# Patient Record
Sex: MALE | Race: ASIAN | NOT HISPANIC OR LATINO | URBAN - METROPOLITAN AREA
[De-identification: names, ages, dates, MRNs, and addresses within clinical notes are randomized per-mention and may not be internally consistent; named-entity substitution may affect disease eponyms.]

---

## 2015-12-16 RX ORDER — AMLODIPINE BESYLATE 2.5 MG/1
1 TABLET ORAL
Qty: 0 | Refills: 0 | COMMUNITY
Start: 2015-12-16

## 2015-12-16 RX ORDER — INSULIN GLARGINE 100 [IU]/ML
10 INJECTION, SOLUTION SUBCUTANEOUS
Qty: 0 | Refills: 0 | COMMUNITY
Start: 2015-12-16

## 2015-12-16 RX ORDER — INSULIN LISPRO 100/ML
8 VIAL (ML) SUBCUTANEOUS
Qty: 0 | Refills: 0 | COMMUNITY
Start: 2015-12-16

## 2017-01-01 ENCOUNTER — INPATIENT (INPATIENT)
Facility: HOSPITAL | Age: 82
LOS: 7 days | End: 2017-06-02
Attending: INTERNAL MEDICINE | Admitting: INTERNAL MEDICINE
Payer: MEDICAID

## 2017-01-01 VITALS
DIASTOLIC BLOOD PRESSURE: 68 MMHG | TEMPERATURE: 97 F | RESPIRATION RATE: 24 BRPM | OXYGEN SATURATION: 100 % | HEART RATE: 101 BPM | SYSTOLIC BLOOD PRESSURE: 153 MMHG

## 2017-01-01 DIAGNOSIS — Z71.89 OTHER SPECIFIED COUNSELING: ICD-10-CM

## 2017-01-01 DIAGNOSIS — R06.00 DYSPNEA, UNSPECIFIED: ICD-10-CM

## 2017-01-01 DIAGNOSIS — N18.6 END STAGE RENAL DISEASE: ICD-10-CM

## 2017-01-01 DIAGNOSIS — E87.4 MIXED DISORDER OF ACID-BASE BALANCE: ICD-10-CM

## 2017-01-01 DIAGNOSIS — B33.8 OTHER SPECIFIED VIRAL DISEASES: ICD-10-CM

## 2017-01-01 DIAGNOSIS — J96.90 RESPIRATORY FAILURE, UNSPECIFIED, UNSPECIFIED WHETHER WITH HYPOXIA OR HYPERCAPNIA: ICD-10-CM

## 2017-01-01 DIAGNOSIS — Z51.5 ENCOUNTER FOR PALLIATIVE CARE: ICD-10-CM

## 2017-01-01 DIAGNOSIS — I50.30 UNSPECIFIED DIASTOLIC (CONGESTIVE) HEART FAILURE: ICD-10-CM

## 2017-01-01 DIAGNOSIS — R57.9 SHOCK, UNSPECIFIED: ICD-10-CM

## 2017-01-01 DIAGNOSIS — J44.1 CHRONIC OBSTRUCTIVE PULMONARY DISEASE WITH (ACUTE) EXACERBATION: ICD-10-CM

## 2017-01-01 DIAGNOSIS — R63.3 FEEDING DIFFICULTIES: ICD-10-CM

## 2017-01-01 DIAGNOSIS — J96.01 ACUTE RESPIRATORY FAILURE WITH HYPOXIA: ICD-10-CM

## 2017-01-01 DIAGNOSIS — E11.9 TYPE 2 DIABETES MELLITUS WITHOUT COMPLICATIONS: ICD-10-CM

## 2017-01-01 DIAGNOSIS — R53.2 FUNCTIONAL QUADRIPLEGIA: ICD-10-CM

## 2017-01-01 DIAGNOSIS — E87.1 HYPO-OSMOLALITY AND HYPONATREMIA: ICD-10-CM

## 2017-01-01 DIAGNOSIS — E03.9 HYPOTHYROIDISM, UNSPECIFIED: ICD-10-CM

## 2017-01-01 DIAGNOSIS — R65.10 SYSTEMIC INFLAMMATORY RESPONSE SYNDROME (SIRS) OF NON-INFECTIOUS ORIGIN WITHOUT ACUTE ORGAN DYSFUNCTION: ICD-10-CM

## 2017-01-01 DIAGNOSIS — J44.9 CHRONIC OBSTRUCTIVE PULMONARY DISEASE, UNSPECIFIED: ICD-10-CM

## 2017-01-01 DIAGNOSIS — I50.9 HEART FAILURE, UNSPECIFIED: ICD-10-CM

## 2017-01-01 DIAGNOSIS — I10 ESSENTIAL (PRIMARY) HYPERTENSION: ICD-10-CM

## 2017-01-01 DIAGNOSIS — E83.39 OTHER DISORDERS OF PHOSPHORUS METABOLISM: ICD-10-CM

## 2017-01-01 DIAGNOSIS — K21.9 GASTRO-ESOPHAGEAL REFLUX DISEASE WITHOUT ESOPHAGITIS: ICD-10-CM

## 2017-01-01 DIAGNOSIS — R50.9 FEVER, UNSPECIFIED: ICD-10-CM

## 2017-01-01 DIAGNOSIS — R14.0 ABDOMINAL DISTENSION (GASEOUS): ICD-10-CM

## 2017-01-01 DIAGNOSIS — A41.9 SEPSIS, UNSPECIFIED ORGANISM: ICD-10-CM

## 2017-01-01 DIAGNOSIS — Z29.9 ENCOUNTER FOR PROPHYLACTIC MEASURES, UNSPECIFIED: ICD-10-CM

## 2017-01-01 LAB
ALBUMIN SERPL ELPH-MCNC: 2.7 G/DL — LOW (ref 3.3–5)
ALBUMIN SERPL ELPH-MCNC: 3.1 G/DL — LOW (ref 3.3–5)
ALBUMIN SERPL ELPH-MCNC: 3.1 G/DL — LOW (ref 3.3–5)
ALBUMIN SERPL ELPH-MCNC: 3.2 G/DL — LOW (ref 3.3–5)
ALBUMIN SERPL ELPH-MCNC: 3.3 G/DL — SIGNIFICANT CHANGE UP (ref 3.3–5)
ALBUMIN SERPL ELPH-MCNC: 3.4 G/DL — SIGNIFICANT CHANGE UP (ref 3.3–5)
ALBUMIN SERPL ELPH-MCNC: 3.4 G/DL — SIGNIFICANT CHANGE UP (ref 3.3–5)
ALBUMIN SERPL ELPH-MCNC: 3.9 G/DL — SIGNIFICANT CHANGE UP (ref 3.3–5)
ALBUMIN SERPL ELPH-MCNC: 4.3 G/DL — SIGNIFICANT CHANGE UP (ref 3.3–5)
ALP SERPL-CCNC: 105 U/L — SIGNIFICANT CHANGE UP (ref 40–120)
ALP SERPL-CCNC: 56 U/L — SIGNIFICANT CHANGE UP (ref 40–120)
ALP SERPL-CCNC: 62 U/L — SIGNIFICANT CHANGE UP (ref 40–120)
ALP SERPL-CCNC: 72 U/L — SIGNIFICANT CHANGE UP (ref 40–120)
ALP SERPL-CCNC: 72 U/L — SIGNIFICANT CHANGE UP (ref 40–120)
ALP SERPL-CCNC: 76 U/L — SIGNIFICANT CHANGE UP (ref 40–120)
ALP SERPL-CCNC: 78 U/L — SIGNIFICANT CHANGE UP (ref 40–120)
ALP SERPL-CCNC: 80 U/L — SIGNIFICANT CHANGE UP (ref 40–120)
ALP SERPL-CCNC: 97 U/L — SIGNIFICANT CHANGE UP (ref 40–120)
ALT FLD-CCNC: 10 U/L — SIGNIFICANT CHANGE UP (ref 4–41)
ALT FLD-CCNC: 11 U/L — SIGNIFICANT CHANGE UP (ref 4–41)
ALT FLD-CCNC: 11 U/L — SIGNIFICANT CHANGE UP (ref 4–41)
ALT FLD-CCNC: 12 U/L — SIGNIFICANT CHANGE UP (ref 4–41)
ALT FLD-CCNC: 13 U/L — SIGNIFICANT CHANGE UP (ref 4–41)
ALT FLD-CCNC: 16 U/L — SIGNIFICANT CHANGE UP (ref 4–41)
ALT FLD-CCNC: 6 U/L — SIGNIFICANT CHANGE UP (ref 4–41)
ALT FLD-CCNC: 9 U/L — SIGNIFICANT CHANGE UP (ref 4–41)
ALT FLD-CCNC: 9 U/L — SIGNIFICANT CHANGE UP (ref 4–41)
ANISOCYTOSIS BLD QL: SLIGHT — SIGNIFICANT CHANGE UP
APPEARANCE UR: CLEAR — SIGNIFICANT CHANGE UP
AST SERPL-CCNC: 10 U/L — SIGNIFICANT CHANGE UP (ref 4–40)
AST SERPL-CCNC: 13 U/L — SIGNIFICANT CHANGE UP (ref 4–40)
AST SERPL-CCNC: 15 U/L — SIGNIFICANT CHANGE UP (ref 4–40)
AST SERPL-CCNC: 17 U/L — SIGNIFICANT CHANGE UP (ref 4–40)
AST SERPL-CCNC: 18 U/L — SIGNIFICANT CHANGE UP (ref 4–40)
AST SERPL-CCNC: 19 U/L — SIGNIFICANT CHANGE UP (ref 4–40)
AST SERPL-CCNC: 20 U/L — SIGNIFICANT CHANGE UP (ref 4–40)
AST SERPL-CCNC: 42 U/L — HIGH (ref 4–40)
AST SERPL-CCNC: 8 U/L — SIGNIFICANT CHANGE UP (ref 4–40)
B PERT DNA SPEC QL NAA+PROBE: SIGNIFICANT CHANGE UP
B-OH-BUTYR SERPL-SCNC: 0.4 MMOL/L — SIGNIFICANT CHANGE UP (ref 0–0.4)
BACTERIA # UR AUTO: SIGNIFICANT CHANGE UP
BACTERIA BLD CULT: SIGNIFICANT CHANGE UP
BACTERIA BLD CULT: SIGNIFICANT CHANGE UP
BASE EXCESS BLDA CALC-SCNC: -0.3 MMOL/L — SIGNIFICANT CHANGE UP
BASE EXCESS BLDA CALC-SCNC: -1.1 MMOL/L — SIGNIFICANT CHANGE UP
BASE EXCESS BLDA CALC-SCNC: -1.2 MMOL/L — SIGNIFICANT CHANGE UP
BASE EXCESS BLDA CALC-SCNC: -1.2 MMOL/L — SIGNIFICANT CHANGE UP
BASE EXCESS BLDA CALC-SCNC: -1.4 MMOL/L — SIGNIFICANT CHANGE UP
BASE EXCESS BLDA CALC-SCNC: -1.8 MMOL/L — SIGNIFICANT CHANGE UP
BASE EXCESS BLDA CALC-SCNC: -2.1 MMOL/L — SIGNIFICANT CHANGE UP
BASE EXCESS BLDA CALC-SCNC: -2.1 MMOL/L — SIGNIFICANT CHANGE UP
BASE EXCESS BLDA CALC-SCNC: -2.9 MMOL/L — SIGNIFICANT CHANGE UP
BASE EXCESS BLDA CALC-SCNC: -3 MMOL/L — SIGNIFICANT CHANGE UP
BASE EXCESS BLDA CALC-SCNC: -3.3 MMOL/L — SIGNIFICANT CHANGE UP
BASE EXCESS BLDA CALC-SCNC: -3.4 MMOL/L — SIGNIFICANT CHANGE UP
BASE EXCESS BLDA CALC-SCNC: -4 MMOL/L — SIGNIFICANT CHANGE UP
BASE EXCESS BLDA CALC-SCNC: -4.3 MMOL/L — SIGNIFICANT CHANGE UP
BASE EXCESS BLDA CALC-SCNC: -4.6 MMOL/L — SIGNIFICANT CHANGE UP
BASE EXCESS BLDA CALC-SCNC: -4.6 MMOL/L — SIGNIFICANT CHANGE UP
BASE EXCESS BLDA CALC-SCNC: -5.1 MMOL/L — SIGNIFICANT CHANGE UP
BASE EXCESS BLDA CALC-SCNC: -5.2 MMOL/L — SIGNIFICANT CHANGE UP
BASE EXCESS BLDA CALC-SCNC: -5.2 MMOL/L — SIGNIFICANT CHANGE UP
BASE EXCESS BLDA CALC-SCNC: -6.2 MMOL/L — SIGNIFICANT CHANGE UP
BASE EXCESS BLDA CALC-SCNC: -6.7 MMOL/L — SIGNIFICANT CHANGE UP
BASE EXCESS BLDA CALC-SCNC: 0.6 MMOL/L — SIGNIFICANT CHANGE UP
BASE EXCESS BLDA CALC-SCNC: 1 MMOL/L — SIGNIFICANT CHANGE UP
BASE EXCESS BLDA CALC-SCNC: 1.1 MMOL/L — SIGNIFICANT CHANGE UP
BASE EXCESS BLDA CALC-SCNC: 1.7 MMOL/L — SIGNIFICANT CHANGE UP
BASE EXCESS BLDA CALC-SCNC: 2.8 MMOL/L — SIGNIFICANT CHANGE UP
BASE EXCESS BLDA CALC-SCNC: 3 MMOL/L — SIGNIFICANT CHANGE UP
BASE EXCESS BLDV CALC-SCNC: 1.3 MMOL/L — SIGNIFICANT CHANGE UP
BASE EXCESS BLDV CALC-SCNC: 3.8 MMOL/L — SIGNIFICANT CHANGE UP
BASOPHILS # BLD AUTO: 0.01 K/UL — SIGNIFICANT CHANGE UP (ref 0–0.2)
BASOPHILS # BLD AUTO: 0.02 K/UL — SIGNIFICANT CHANGE UP (ref 0–0.2)
BASOPHILS # BLD AUTO: 0.03 K/UL — SIGNIFICANT CHANGE UP (ref 0–0.2)
BASOPHILS # BLD AUTO: 0.03 K/UL — SIGNIFICANT CHANGE UP (ref 0–0.2)
BASOPHILS NFR BLD AUTO: 0.1 % — SIGNIFICANT CHANGE UP (ref 0–2)
BASOPHILS NFR BLD AUTO: 0.2 % — SIGNIFICANT CHANGE UP (ref 0–2)
BASOPHILS NFR BLD AUTO: 0.3 % — SIGNIFICANT CHANGE UP (ref 0–2)
BASOPHILS NFR BLD AUTO: 0.3 % — SIGNIFICANT CHANGE UP (ref 0–2)
BASOPHILS NFR SPEC: 0 % — SIGNIFICANT CHANGE UP (ref 0–2)
BASOPHILS NFR SPEC: 0 % — SIGNIFICANT CHANGE UP (ref 0–2)
BILIRUB SERPL-MCNC: 0.2 MG/DL — SIGNIFICANT CHANGE UP (ref 0.2–1.2)
BILIRUB SERPL-MCNC: 0.3 MG/DL — SIGNIFICANT CHANGE UP (ref 0.2–1.2)
BILIRUB SERPL-MCNC: 0.4 MG/DL — SIGNIFICANT CHANGE UP (ref 0.2–1.2)
BILIRUB SERPL-MCNC: 0.6 MG/DL — SIGNIFICANT CHANGE UP (ref 0.2–1.2)
BILIRUB SERPL-MCNC: 0.8 MG/DL — SIGNIFICANT CHANGE UP (ref 0.2–1.2)
BILIRUB UR-MCNC: NEGATIVE — SIGNIFICANT CHANGE UP
BLASTS # FLD: 0 % — SIGNIFICANT CHANGE UP (ref 0–0)
BLOOD GAS VENOUS - CREATININE: 3.81 MG/DL — HIGH (ref 0.5–1.3)
BLOOD UR QL VISUAL: NEGATIVE — SIGNIFICANT CHANGE UP
BUN SERPL-MCNC: 22 MG/DL — SIGNIFICANT CHANGE UP (ref 7–23)
BUN SERPL-MCNC: 35 MG/DL — HIGH (ref 7–23)
BUN SERPL-MCNC: 43 MG/DL — HIGH (ref 7–23)
BUN SERPL-MCNC: 43 MG/DL — HIGH (ref 7–23)
BUN SERPL-MCNC: 44 MG/DL — HIGH (ref 7–23)
BUN SERPL-MCNC: 59 MG/DL — HIGH (ref 7–23)
BUN SERPL-MCNC: 64 MG/DL — HIGH (ref 7–23)
BUN SERPL-MCNC: 68 MG/DL — HIGH (ref 7–23)
BUN SERPL-MCNC: 70 MG/DL — HIGH (ref 7–23)
BUN SERPL-MCNC: 84 MG/DL — HIGH (ref 7–23)
BUN SERPL-MCNC: 87 MG/DL — HIGH (ref 7–23)
BUN SERPL-MCNC: 87 MG/DL — HIGH (ref 7–23)
BUN SERPL-MCNC: 92 MG/DL — HIGH (ref 7–23)
C DIFF TOX GENS STL QL NAA+PROBE: SIGNIFICANT CHANGE UP
C PNEUM DNA SPEC QL NAA+PROBE: NOT DETECTED — SIGNIFICANT CHANGE UP
CA-I BLDA-SCNC: 1.01 MMOL/L — LOW (ref 1.15–1.29)
CA-I BLDA-SCNC: 1.02 MMOL/L — LOW (ref 1.15–1.29)
CA-I BLDA-SCNC: 1.06 MMOL/L — LOW (ref 1.15–1.29)
CA-I BLDA-SCNC: 1.08 MMOL/L — LOW (ref 1.15–1.29)
CA-I BLDA-SCNC: 1.11 MMOL/L — LOW (ref 1.15–1.29)
CALCIUM SERPL-MCNC: 6.9 MG/DL — LOW (ref 8.4–10.5)
CALCIUM SERPL-MCNC: 7 MG/DL — LOW (ref 8.4–10.5)
CALCIUM SERPL-MCNC: 7.1 MG/DL — LOW (ref 8.4–10.5)
CALCIUM SERPL-MCNC: 7.1 MG/DL — LOW (ref 8.4–10.5)
CALCIUM SERPL-MCNC: 7.3 MG/DL — LOW (ref 8.4–10.5)
CALCIUM SERPL-MCNC: 7.4 MG/DL — LOW (ref 8.4–10.5)
CALCIUM SERPL-MCNC: 7.5 MG/DL — LOW (ref 8.4–10.5)
CALCIUM SERPL-MCNC: 7.7 MG/DL — LOW (ref 8.4–10.5)
CALCIUM SERPL-MCNC: 7.7 MG/DL — LOW (ref 8.4–10.5)
CALCIUM SERPL-MCNC: 8.2 MG/DL — LOW (ref 8.4–10.5)
CALCIUM SERPL-MCNC: 8.6 MG/DL — SIGNIFICANT CHANGE UP (ref 8.4–10.5)
CHLORIDE BLDA-SCNC: 100 MMOL/L — SIGNIFICANT CHANGE UP (ref 96–108)
CHLORIDE BLDA-SCNC: 100 MMOL/L — SIGNIFICANT CHANGE UP (ref 96–108)
CHLORIDE BLDA-SCNC: 103 MMOL/L — SIGNIFICANT CHANGE UP (ref 96–108)
CHLORIDE BLDA-SCNC: 103 MMOL/L — SIGNIFICANT CHANGE UP (ref 96–108)
CHLORIDE BLDA-SCNC: 82 MMOL/L — LOW (ref 96–108)
CHLORIDE BLDA-SCNC: 88 MMOL/L — LOW (ref 96–108)
CHLORIDE BLDA-SCNC: 89 MMOL/L — LOW (ref 96–108)
CHLORIDE BLDA-SCNC: 90 MMOL/L — LOW (ref 96–108)
CHLORIDE BLDA-SCNC: 92 MMOL/L — LOW (ref 96–108)
CHLORIDE BLDA-SCNC: 92 MMOL/L — LOW (ref 96–108)
CHLORIDE BLDA-SCNC: 94 MMOL/L — LOW (ref 96–108)
CHLORIDE BLDA-SCNC: 95 MMOL/L — LOW (ref 96–108)
CHLORIDE BLDV-SCNC: 98 MMOL/L — SIGNIFICANT CHANGE UP (ref 96–108)
CHLORIDE SERPL-SCNC: 81 MMOL/L — LOW (ref 98–107)
CHLORIDE SERPL-SCNC: 82 MMOL/L — LOW (ref 98–107)
CHLORIDE SERPL-SCNC: 85 MMOL/L — LOW (ref 98–107)
CHLORIDE SERPL-SCNC: 86 MMOL/L — LOW (ref 98–107)
CHLORIDE SERPL-SCNC: 88 MMOL/L — LOW (ref 98–107)
CHLORIDE SERPL-SCNC: 89 MMOL/L — LOW (ref 98–107)
CHLORIDE SERPL-SCNC: 89 MMOL/L — LOW (ref 98–107)
CHLORIDE SERPL-SCNC: 91 MMOL/L — LOW (ref 98–107)
CHLORIDE SERPL-SCNC: 92 MMOL/L — LOW (ref 98–107)
CHLORIDE SERPL-SCNC: 94 MMOL/L — LOW (ref 98–107)
CHLORIDE SERPL-SCNC: 95 MMOL/L — LOW (ref 98–107)
CHLORIDE SERPL-SCNC: 95 MMOL/L — LOW (ref 98–107)
CHLORIDE SERPL-SCNC: 97 MMOL/L — LOW (ref 98–107)
CO2 SERPL-SCNC: 19 MMOL/L — LOW (ref 22–31)
CO2 SERPL-SCNC: 20 MMOL/L — LOW (ref 22–31)
CO2 SERPL-SCNC: 21 MMOL/L — LOW (ref 22–31)
CO2 SERPL-SCNC: 22 MMOL/L — SIGNIFICANT CHANGE UP (ref 22–31)
CO2 SERPL-SCNC: 23 MMOL/L — SIGNIFICANT CHANGE UP (ref 22–31)
CO2 SERPL-SCNC: 25 MMOL/L — SIGNIFICANT CHANGE UP (ref 22–31)
CO2 SERPL-SCNC: 26 MMOL/L — SIGNIFICANT CHANGE UP (ref 22–31)
CO2 SERPL-SCNC: 26 MMOL/L — SIGNIFICANT CHANGE UP (ref 22–31)
CO2 SERPL-SCNC: 27 MMOL/L — SIGNIFICANT CHANGE UP (ref 22–31)
CO2 SERPL-SCNC: 29 MMOL/L — SIGNIFICANT CHANGE UP (ref 22–31)
CO2 SERPL-SCNC: 34 MMOL/L — HIGH (ref 22–31)
COLOR SPEC: YELLOW — SIGNIFICANT CHANGE UP
CREAT BLDA-MCNC: 3.24 MG/DL — HIGH (ref 0.5–1.3)
CREAT BLDA-MCNC: 3.28 MG/DL — HIGH (ref 0.5–1.3)
CREAT BLDA-MCNC: 4.14 MG/DL — HIGH (ref 0.5–1.3)
CREAT BLDA-MCNC: 4.61 MG/DL — HIGH (ref 0.5–1.3)
CREAT BLDA-MCNC: 4.84 MG/DL — HIGH (ref 0.5–1.3)
CREAT BLDA-MCNC: 5.56 MG/DL — HIGH (ref 0.5–1.3)
CREAT BLDA-MCNC: 5.62 MG/DL — HIGH (ref 0.5–1.3)
CREAT BLDA-MCNC: 5.74 MG/DL — HIGH (ref 0.5–1.3)
CREAT BLDA-MCNC: 5.84 MG/DL — HIGH (ref 0.5–1.3)
CREAT BLDA-MCNC: 6.12 MG/DL — HIGH (ref 0.5–1.3)
CREAT BLDA-MCNC: 6.37 MG/DL — HIGH (ref 0.5–1.3)
CREAT BLDA-MCNC: SIGNIFICANT CHANGE UP MG/DL (ref 0.5–1.3)
CREAT SERPL-MCNC: 1.99 MG/DL — HIGH (ref 0.5–1.3)
CREAT SERPL-MCNC: 2.65 MG/DL — HIGH (ref 0.5–1.3)
CREAT SERPL-MCNC: 2.99 MG/DL — HIGH (ref 0.5–1.3)
CREAT SERPL-MCNC: 3.54 MG/DL — HIGH (ref 0.5–1.3)
CREAT SERPL-MCNC: 3.92 MG/DL — HIGH (ref 0.5–1.3)
CREAT SERPL-MCNC: 4.12 MG/DL — HIGH (ref 0.5–1.3)
CREAT SERPL-MCNC: 4.28 MG/DL — HIGH (ref 0.5–1.3)
CREAT SERPL-MCNC: 4.71 MG/DL — HIGH (ref 0.5–1.3)
CREAT SERPL-MCNC: 4.97 MG/DL — HIGH (ref 0.5–1.3)
CREAT SERPL-MCNC: 5.02 MG/DL — HIGH (ref 0.5–1.3)
CREAT SERPL-MCNC: 5.02 MG/DL — HIGH (ref 0.5–1.3)
CREAT SERPL-MCNC: 5.18 MG/DL — HIGH (ref 0.5–1.3)
CREAT SERPL-MCNC: 5.41 MG/DL — HIGH (ref 0.5–1.3)
EOSINOPHIL # BLD AUTO: 0 K/UL — SIGNIFICANT CHANGE UP (ref 0–0.5)
EOSINOPHIL # BLD AUTO: 0.01 K/UL — SIGNIFICANT CHANGE UP (ref 0–0.5)
EOSINOPHIL # BLD AUTO: 0.03 K/UL — SIGNIFICANT CHANGE UP (ref 0–0.5)
EOSINOPHIL NFR BLD AUTO: 0 % — SIGNIFICANT CHANGE UP (ref 0–6)
EOSINOPHIL NFR BLD AUTO: 0.1 % — SIGNIFICANT CHANGE UP (ref 0–6)
EOSINOPHIL NFR BLD AUTO: 0.3 % — SIGNIFICANT CHANGE UP (ref 0–6)
EOSINOPHIL NFR FLD: 0 % — SIGNIFICANT CHANGE UP (ref 0–6)
EOSINOPHIL NFR FLD: 1 % — SIGNIFICANT CHANGE UP (ref 0–6)
EPI CELLS # UR: SIGNIFICANT CHANGE UP
FLUAV H1 2009 PAND RNA SPEC QL NAA+PROBE: NOT DETECTED — SIGNIFICANT CHANGE UP
FLUAV H1 RNA SPEC QL NAA+PROBE: NOT DETECTED — SIGNIFICANT CHANGE UP
FLUAV H3 RNA SPEC QL NAA+PROBE: NOT DETECTED — SIGNIFICANT CHANGE UP
FLUAV SUBTYP SPEC NAA+PROBE: SIGNIFICANT CHANGE UP
FLUBV RNA SPEC QL NAA+PROBE: NOT DETECTED — SIGNIFICANT CHANGE UP
GAS PNL BLDV: 131 MMOL/L — LOW (ref 136–146)
GAS PNL BLDV: 132 MMOL/L — LOW (ref 136–146)
GIANT PLATELETS BLD QL SMEAR: PRESENT — SIGNIFICANT CHANGE UP
GIANT PLATELETS BLD QL SMEAR: PRESENT — SIGNIFICANT CHANGE UP
GLUCOSE BLDA-MCNC: 119 MG/DL — HIGH (ref 70–99)
GLUCOSE BLDA-MCNC: 130 MG/DL — HIGH (ref 70–99)
GLUCOSE BLDA-MCNC: 144 MG/DL — HIGH (ref 70–99)
GLUCOSE BLDA-MCNC: 145 MG/DL — HIGH (ref 70–99)
GLUCOSE BLDA-MCNC: 156 MG/DL — HIGH (ref 70–99)
GLUCOSE BLDA-MCNC: 164 MG/DL — HIGH (ref 70–99)
GLUCOSE BLDA-MCNC: 167 MG/DL — HIGH (ref 70–99)
GLUCOSE BLDA-MCNC: 170 MG/DL — HIGH (ref 70–99)
GLUCOSE BLDA-MCNC: 172 MG/DL — HIGH (ref 70–99)
GLUCOSE BLDA-MCNC: 174 MG/DL — HIGH (ref 70–99)
GLUCOSE BLDA-MCNC: 208 MG/DL — HIGH (ref 70–99)
GLUCOSE BLDA-MCNC: 214 MG/DL — HIGH (ref 70–99)
GLUCOSE BLDA-MCNC: 227 MG/DL — HIGH (ref 70–99)
GLUCOSE BLDA-MCNC: 247 MG/DL — HIGH (ref 70–99)
GLUCOSE BLDA-MCNC: 252 MG/DL — HIGH (ref 70–99)
GLUCOSE BLDA-MCNC: 259 MG/DL — HIGH (ref 70–99)
GLUCOSE BLDA-MCNC: 280 MG/DL — HIGH (ref 70–99)
GLUCOSE BLDA-MCNC: 282 MG/DL — HIGH (ref 70–99)
GLUCOSE BLDA-MCNC: 284 MG/DL — HIGH (ref 70–99)
GLUCOSE BLDA-MCNC: 295 MG/DL — HIGH (ref 70–99)
GLUCOSE BLDA-MCNC: 321 MG/DL — HIGH (ref 70–99)
GLUCOSE BLDA-MCNC: 327 MG/DL — HIGH (ref 70–99)
GLUCOSE BLDA-MCNC: 368 MG/DL — HIGH (ref 70–99)
GLUCOSE BLDA-MCNC: 387 MG/DL — HIGH (ref 70–99)
GLUCOSE BLDA-MCNC: 476 MG/DL — CRITICAL HIGH (ref 70–99)
GLUCOSE BLDA-MCNC: 849 MG/DL — CRITICAL HIGH (ref 70–99)
GLUCOSE BLDV-MCNC: 191 — HIGH (ref 70–99)
GLUCOSE BLDV-MCNC: 207 — HIGH (ref 70–99)
GLUCOSE SERPL-MCNC: 101 MG/DL — HIGH (ref 70–99)
GLUCOSE SERPL-MCNC: 127 MG/DL — HIGH (ref 70–99)
GLUCOSE SERPL-MCNC: 140 MG/DL — HIGH (ref 70–99)
GLUCOSE SERPL-MCNC: 157 MG/DL — HIGH (ref 70–99)
GLUCOSE SERPL-MCNC: 160 MG/DL — HIGH (ref 70–99)
GLUCOSE SERPL-MCNC: 164 MG/DL — HIGH (ref 70–99)
GLUCOSE SERPL-MCNC: 176 MG/DL — HIGH (ref 70–99)
GLUCOSE SERPL-MCNC: 188 MG/DL — HIGH (ref 70–99)
GLUCOSE SERPL-MCNC: 240 MG/DL — HIGH (ref 70–99)
GLUCOSE SERPL-MCNC: 287 MG/DL — HIGH (ref 70–99)
GLUCOSE SERPL-MCNC: 384 MG/DL — HIGH (ref 70–99)
GLUCOSE SERPL-MCNC: 386 MG/DL — HIGH (ref 70–99)
GLUCOSE SERPL-MCNC: 484 MG/DL — CRITICAL HIGH (ref 70–99)
GLUCOSE UR-MCNC: NEGATIVE — SIGNIFICANT CHANGE UP
GRAM STN SPT: SIGNIFICANT CHANGE UP
HADV DNA SPEC QL NAA+PROBE: NOT DETECTED — SIGNIFICANT CHANGE UP
HBA1C BLD-MCNC: 7.8 % — HIGH (ref 4–5.6)
HBV SURFACE AG SER-ACNC: NEGATIVE — SIGNIFICANT CHANGE UP
HCO3 BLDA-SCNC: 18 MMOL/L — LOW (ref 22–26)
HCO3 BLDA-SCNC: 18 MMOL/L — LOW (ref 22–26)
HCO3 BLDA-SCNC: 19 MMOL/L — LOW (ref 22–26)
HCO3 BLDA-SCNC: 20 MMOL/L — LOW (ref 22–26)
HCO3 BLDA-SCNC: 21 MMOL/L — LOW (ref 22–26)
HCO3 BLDA-SCNC: 22 MMOL/L — SIGNIFICANT CHANGE UP (ref 22–26)
HCO3 BLDA-SCNC: 23 MMOL/L — SIGNIFICANT CHANGE UP (ref 22–26)
HCO3 BLDA-SCNC: 24 MMOL/L — SIGNIFICANT CHANGE UP (ref 22–26)
HCO3 BLDA-SCNC: 24 MMOL/L — SIGNIFICANT CHANGE UP (ref 22–26)
HCO3 BLDA-SCNC: 25 MMOL/L — SIGNIFICANT CHANGE UP (ref 22–26)
HCO3 BLDA-SCNC: 26 MMOL/L — SIGNIFICANT CHANGE UP (ref 22–26)
HCO3 BLDA-SCNC: 26 MMOL/L — SIGNIFICANT CHANGE UP (ref 22–26)
HCO3 BLDV-SCNC: 22 MMOL/L — SIGNIFICANT CHANGE UP (ref 20–27)
HCO3 BLDV-SCNC: 25 MMOL/L — SIGNIFICANT CHANGE UP (ref 20–27)
HCOV 229E RNA SPEC QL NAA+PROBE: NOT DETECTED — SIGNIFICANT CHANGE UP
HCOV HKU1 RNA SPEC QL NAA+PROBE: NOT DETECTED — SIGNIFICANT CHANGE UP
HCOV NL63 RNA SPEC QL NAA+PROBE: NOT DETECTED — SIGNIFICANT CHANGE UP
HCOV OC43 RNA SPEC QL NAA+PROBE: NOT DETECTED — SIGNIFICANT CHANGE UP
HCT VFR BLD CALC: 30.6 % — LOW (ref 39–50)
HCT VFR BLD CALC: 31.2 % — LOW (ref 39–50)
HCT VFR BLD CALC: 31.7 % — LOW (ref 39–50)
HCT VFR BLD CALC: 32.5 % — LOW (ref 39–50)
HCT VFR BLD CALC: 32.9 % — LOW (ref 39–50)
HCT VFR BLD CALC: 33.4 % — LOW (ref 39–50)
HCT VFR BLD CALC: 34.2 % — LOW (ref 39–50)
HCT VFR BLD CALC: 34.4 % — LOW (ref 39–50)
HCT VFR BLD CALC: 37.8 % — LOW (ref 39–50)
HCT VFR BLD CALC: 39.2 % — SIGNIFICANT CHANGE UP (ref 39–50)
HCT VFR BLDA CALC: 25.8 % — LOW (ref 39–51)
HCT VFR BLDA CALC: 29.9 % — LOW (ref 39–51)
HCT VFR BLDA CALC: 32 % — LOW (ref 39–51)
HCT VFR BLDA CALC: 32.4 % — LOW (ref 39–51)
HCT VFR BLDA CALC: 32.5 % — LOW (ref 39–51)
HCT VFR BLDA CALC: 33 % — LOW (ref 39–51)
HCT VFR BLDA CALC: 33.6 % — LOW (ref 39–51)
HCT VFR BLDA CALC: 33.8 % — LOW (ref 39–51)
HCT VFR BLDA CALC: 33.8 % — LOW (ref 39–51)
HCT VFR BLDA CALC: 33.9 % — LOW (ref 39–51)
HCT VFR BLDA CALC: 33.9 % — LOW (ref 39–51)
HCT VFR BLDA CALC: 34.2 % — LOW (ref 39–51)
HCT VFR BLDA CALC: 34.4 % — LOW (ref 39–51)
HCT VFR BLDA CALC: 34.5 % — LOW (ref 39–51)
HCT VFR BLDA CALC: 34.7 % — LOW (ref 39–51)
HCT VFR BLDA CALC: 34.8 % — LOW (ref 39–51)
HCT VFR BLDA CALC: 34.9 % — LOW (ref 39–51)
HCT VFR BLDA CALC: 35.1 % — LOW (ref 39–51)
HCT VFR BLDA CALC: 37.1 % — LOW (ref 39–51)
HCT VFR BLDA CALC: 37.3 % — LOW (ref 39–51)
HCT VFR BLDA CALC: 37.4 % — LOW (ref 39–51)
HCT VFR BLDA CALC: 37.6 % — LOW (ref 39–51)
HCT VFR BLDA CALC: 38.1 % — LOW (ref 39–51)
HCT VFR BLDA CALC: 38.5 % — LOW (ref 39–51)
HCT VFR BLDA CALC: 38.8 % — LOW (ref 39–51)
HCT VFR BLDA CALC: 39.1 % — SIGNIFICANT CHANGE UP (ref 39–51)
HCT VFR BLDV CALC: 39.5 % — SIGNIFICANT CHANGE UP (ref 39–51)
HCT VFR BLDV CALC: 40.2 % — SIGNIFICANT CHANGE UP (ref 39–51)
HGB BLD-MCNC: 10.2 G/DL — LOW (ref 13–17)
HGB BLD-MCNC: 10.2 G/DL — LOW (ref 13–17)
HGB BLD-MCNC: 10.3 G/DL — LOW (ref 13–17)
HGB BLD-MCNC: 10.8 G/DL — LOW (ref 13–17)
HGB BLD-MCNC: 10.9 G/DL — LOW (ref 13–17)
HGB BLD-MCNC: 11 G/DL — LOW (ref 13–17)
HGB BLD-MCNC: 11.1 G/DL — LOW (ref 13–17)
HGB BLD-MCNC: 11.2 G/DL — LOW (ref 13–17)
HGB BLD-MCNC: 12.3 G/DL — LOW (ref 13–17)
HGB BLD-MCNC: 12.7 G/DL — LOW (ref 13–17)
HGB BLDA-MCNC: 10.4 G/DL — LOW (ref 13–17)
HGB BLDA-MCNC: 10.5 G/DL — LOW (ref 13–17)
HGB BLDA-MCNC: 10.5 G/DL — LOW (ref 13–17)
HGB BLDA-MCNC: 10.7 G/DL — LOW (ref 13–17)
HGB BLDA-MCNC: 10.9 G/DL — LOW (ref 13–17)
HGB BLDA-MCNC: 10.9 G/DL — LOW (ref 13–17)
HGB BLDA-MCNC: 11 G/DL — LOW (ref 13–17)
HGB BLDA-MCNC: 11.1 G/DL — LOW (ref 13–17)
HGB BLDA-MCNC: 11.2 G/DL — LOW (ref 13–17)
HGB BLDA-MCNC: 11.2 G/DL — LOW (ref 13–17)
HGB BLDA-MCNC: 11.3 G/DL — LOW (ref 13–17)
HGB BLDA-MCNC: 11.4 G/DL — LOW (ref 13–17)
HGB BLDA-MCNC: 12.1 G/DL — LOW (ref 13–17)
HGB BLDA-MCNC: 12.1 G/DL — LOW (ref 13–17)
HGB BLDA-MCNC: 12.2 G/DL — LOW (ref 13–17)
HGB BLDA-MCNC: 12.2 G/DL — LOW (ref 13–17)
HGB BLDA-MCNC: 12.4 G/DL — LOW (ref 13–17)
HGB BLDA-MCNC: 12.5 G/DL — LOW (ref 13–17)
HGB BLDA-MCNC: 12.6 G/DL — LOW (ref 13–17)
HGB BLDA-MCNC: 12.7 G/DL — LOW (ref 13–17)
HGB BLDA-MCNC: 8.3 G/DL — LOW (ref 13–17)
HGB BLDA-MCNC: 9.7 G/DL — LOW (ref 13–17)
HGB BLDV-MCNC: 12.8 G/DL — LOW (ref 13–17)
HGB BLDV-MCNC: 13.1 G/DL — SIGNIFICANT CHANGE UP (ref 13–17)
HMPV RNA SPEC QL NAA+PROBE: NOT DETECTED — SIGNIFICANT CHANGE UP
HPIV1 RNA SPEC QL NAA+PROBE: NOT DETECTED — SIGNIFICANT CHANGE UP
HPIV2 RNA SPEC QL NAA+PROBE: NOT DETECTED — SIGNIFICANT CHANGE UP
HPIV3 RNA SPEC QL NAA+PROBE: POSITIVE — HIGH
HPIV4 RNA SPEC QL NAA+PROBE: NOT DETECTED — SIGNIFICANT CHANGE UP
HYPOCHROMIA BLD QL: SLIGHT — SIGNIFICANT CHANGE UP
HYPOCHROMIA BLD QL: SLIGHT — SIGNIFICANT CHANGE UP
IMM GRANULOCYTES NFR BLD AUTO: 0.1 % — SIGNIFICANT CHANGE UP (ref 0–1.5)
IMM GRANULOCYTES NFR BLD AUTO: 0.2 % — SIGNIFICANT CHANGE UP (ref 0–1.5)
IMM GRANULOCYTES NFR BLD AUTO: 0.6 % — SIGNIFICANT CHANGE UP (ref 0–1.5)
IMM GRANULOCYTES NFR BLD AUTO: 0.6 % — SIGNIFICANT CHANGE UP (ref 0–1.5)
IMM GRANULOCYTES NFR BLD AUTO: 0.9 % — SIGNIFICANT CHANGE UP (ref 0–1.5)
IMM GRANULOCYTES NFR BLD AUTO: 1 % — SIGNIFICANT CHANGE UP (ref 0–1.5)
INR BLD: 1.14 — SIGNIFICANT CHANGE UP (ref 0.88–1.17)
KETONES UR-MCNC: NEGATIVE — SIGNIFICANT CHANGE UP
L PNEUMO AG UR QL: NEGATIVE — SIGNIFICANT CHANGE UP
LACTATE BLDA-SCNC: 0.8 MMOL/L — SIGNIFICANT CHANGE UP (ref 0.5–2)
LACTATE BLDA-SCNC: 0.9 MMOL/L — SIGNIFICANT CHANGE UP (ref 0.5–2)
LACTATE BLDA-SCNC: 1 MMOL/L — SIGNIFICANT CHANGE UP (ref 0.5–2)
LACTATE BLDA-SCNC: 1.1 MMOL/L — SIGNIFICANT CHANGE UP (ref 0.5–2)
LACTATE BLDA-SCNC: 1.2 MMOL/L — SIGNIFICANT CHANGE UP (ref 0.5–2)
LACTATE BLDA-SCNC: 1.3 MMOL/L — SIGNIFICANT CHANGE UP (ref 0.5–2)
LACTATE BLDA-SCNC: 1.4 MMOL/L — SIGNIFICANT CHANGE UP (ref 0.5–2)
LACTATE BLDA-SCNC: 1.4 MMOL/L — SIGNIFICANT CHANGE UP (ref 0.5–2)
LACTATE BLDA-SCNC: 1.5 MMOL/L — SIGNIFICANT CHANGE UP (ref 0.5–2)
LACTATE BLDA-SCNC: 1.5 MMOL/L — SIGNIFICANT CHANGE UP (ref 0.5–2)
LACTATE BLDA-SCNC: 1.6 MMOL/L — SIGNIFICANT CHANGE UP (ref 0.5–2)
LACTATE BLDA-SCNC: 1.7 MMOL/L — SIGNIFICANT CHANGE UP (ref 0.5–2)
LACTATE BLDV-MCNC: 1.6 MMOL/L — SIGNIFICANT CHANGE UP (ref 0.5–2)
LEUKOCYTE ESTERASE UR-ACNC: HIGH
LG PLATELETS BLD QL AUTO: SLIGHT — SIGNIFICANT CHANGE UP
LYMPHOCYTES # BLD AUTO: 0.28 K/UL — LOW (ref 1–3.3)
LYMPHOCYTES # BLD AUTO: 0.46 K/UL — LOW (ref 1–3.3)
LYMPHOCYTES # BLD AUTO: 0.48 K/UL — LOW (ref 1–3.3)
LYMPHOCYTES # BLD AUTO: 0.72 K/UL — LOW (ref 1–3.3)
LYMPHOCYTES # BLD AUTO: 0.95 K/UL — LOW (ref 1–3.3)
LYMPHOCYTES # BLD AUTO: 1.13 K/UL — SIGNIFICANT CHANGE UP (ref 1–3.3)
LYMPHOCYTES # BLD AUTO: 10.5 % — LOW (ref 13–44)
LYMPHOCYTES # BLD AUTO: 14.7 % — SIGNIFICANT CHANGE UP (ref 13–44)
LYMPHOCYTES # BLD AUTO: 3.5 % — LOW (ref 13–44)
LYMPHOCYTES # BLD AUTO: 4.4 % — LOW (ref 13–44)
LYMPHOCYTES # BLD AUTO: 5.8 % — LOW (ref 13–44)
LYMPHOCYTES # BLD AUTO: 6.2 % — LOW (ref 13–44)
LYMPHOCYTES NFR SPEC AUTO: 16 % — SIGNIFICANT CHANGE UP (ref 13–44)
LYMPHOCYTES NFR SPEC AUTO: 4.3 % — LOW (ref 13–44)
M PNEUMO DNA SPEC QL NAA+PROBE: NOT DETECTED — SIGNIFICANT CHANGE UP
MAGNESIUM SERPL-MCNC: 2 MG/DL — SIGNIFICANT CHANGE UP (ref 1.6–2.6)
MAGNESIUM SERPL-MCNC: 2.2 MG/DL — SIGNIFICANT CHANGE UP (ref 1.6–2.6)
MAGNESIUM SERPL-MCNC: 2.3 MG/DL — SIGNIFICANT CHANGE UP (ref 1.6–2.6)
MAGNESIUM SERPL-MCNC: 2.4 MG/DL — SIGNIFICANT CHANGE UP (ref 1.6–2.6)
MAGNESIUM SERPL-MCNC: 2.4 MG/DL — SIGNIFICANT CHANGE UP (ref 1.6–2.6)
MAGNESIUM SERPL-MCNC: 2.5 MG/DL — SIGNIFICANT CHANGE UP (ref 1.6–2.6)
MAGNESIUM SERPL-MCNC: 2.6 MG/DL — SIGNIFICANT CHANGE UP (ref 1.6–2.6)
MAGNESIUM SERPL-MCNC: 2.6 MG/DL — SIGNIFICANT CHANGE UP (ref 1.6–2.6)
MAGNESIUM SERPL-MCNC: 2.7 MG/DL — HIGH (ref 1.6–2.6)
MANUAL SMEAR VERIFICATION: SIGNIFICANT CHANGE UP
MCHC RBC-ENTMCNC: 19.7 PG — LOW (ref 27–34)
MCHC RBC-ENTMCNC: 19.8 PG — LOW (ref 27–34)
MCHC RBC-ENTMCNC: 20 PG — LOW (ref 27–34)
MCHC RBC-ENTMCNC: 20.1 PG — LOW (ref 27–34)
MCHC RBC-ENTMCNC: 20.2 PG — LOW (ref 27–34)
MCHC RBC-ENTMCNC: 20.2 PG — LOW (ref 27–34)
MCHC RBC-ENTMCNC: 20.4 PG — LOW (ref 27–34)
MCHC RBC-ENTMCNC: 32.2 % — SIGNIFICANT CHANGE UP (ref 32–36)
MCHC RBC-ENTMCNC: 32.4 % — SIGNIFICANT CHANGE UP (ref 32–36)
MCHC RBC-ENTMCNC: 32.5 % — SIGNIFICANT CHANGE UP (ref 32–36)
MCHC RBC-ENTMCNC: 32.5 % — SIGNIFICANT CHANGE UP (ref 32–36)
MCHC RBC-ENTMCNC: 32.6 % — SIGNIFICANT CHANGE UP (ref 32–36)
MCHC RBC-ENTMCNC: 32.6 % — SIGNIFICANT CHANGE UP (ref 32–36)
MCHC RBC-ENTMCNC: 32.8 % — SIGNIFICANT CHANGE UP (ref 32–36)
MCHC RBC-ENTMCNC: 33 % — SIGNIFICANT CHANGE UP (ref 32–36)
MCHC RBC-ENTMCNC: 33.3 % — SIGNIFICANT CHANGE UP (ref 32–36)
MCHC RBC-ENTMCNC: 33.8 % — SIGNIFICANT CHANGE UP (ref 32–36)
MCV RBC AUTO: 59.7 FL — LOW (ref 80–100)
MCV RBC AUTO: 60 FL — LOW (ref 80–100)
MCV RBC AUTO: 60.1 FL — LOW (ref 80–100)
MCV RBC AUTO: 60.7 FL — LOW (ref 80–100)
MCV RBC AUTO: 61.3 FL — LOW (ref 80–100)
MCV RBC AUTO: 61.4 FL — LOW (ref 80–100)
MCV RBC AUTO: 61.6 FL — LOW (ref 80–100)
MCV RBC AUTO: 62.1 FL — LOW (ref 80–100)
MCV RBC AUTO: 62.2 FL — LOW (ref 80–100)
MCV RBC AUTO: 62.8 FL — LOW (ref 80–100)
METAMYELOCYTES # FLD: 0 % — SIGNIFICANT CHANGE UP (ref 0–1)
MICROCYTES BLD QL: SIGNIFICANT CHANGE UP
MICROCYTES BLD QL: SIGNIFICANT CHANGE UP
MONOCYTES # BLD AUTO: 0.09 K/UL — SIGNIFICANT CHANGE UP (ref 0–0.9)
MONOCYTES # BLD AUTO: 0.3 K/UL — SIGNIFICANT CHANGE UP (ref 0–0.9)
MONOCYTES # BLD AUTO: 0.5 K/UL — SIGNIFICANT CHANGE UP (ref 0–0.9)
MONOCYTES # BLD AUTO: 0.69 K/UL — SIGNIFICANT CHANGE UP (ref 0–0.9)
MONOCYTES # BLD AUTO: 0.96 K/UL — HIGH (ref 0–0.9)
MONOCYTES # BLD AUTO: 1.41 K/UL — HIGH (ref 0–0.9)
MONOCYTES NFR BLD AUTO: 1.1 % — LOW (ref 2–14)
MONOCYTES NFR BLD AUTO: 12.5 % — SIGNIFICANT CHANGE UP (ref 2–14)
MONOCYTES NFR BLD AUTO: 3.9 % — SIGNIFICANT CHANGE UP (ref 2–14)
MONOCYTES NFR BLD AUTO: 6.2 % — SIGNIFICANT CHANGE UP (ref 2–14)
MONOCYTES NFR BLD AUTO: 7.6 % — SIGNIFICANT CHANGE UP (ref 2–14)
MONOCYTES NFR BLD AUTO: 8.6 % — SIGNIFICANT CHANGE UP (ref 2–14)
MONOCYTES NFR BLD: 1 % — LOW (ref 2–9)
MONOCYTES NFR BLD: 3.5 % — SIGNIFICANT CHANGE UP (ref 2–9)
MYELOCYTES NFR BLD: 0.9 % — HIGH (ref 0–0)
NEUTROPHIL AB SER-ACNC: 77 % — SIGNIFICANT CHANGE UP (ref 43–77)
NEUTROPHIL AB SER-ACNC: 91.3 % — HIGH (ref 43–77)
NEUTROPHILS # BLD AUTO: 14.09 K/UL — HIGH (ref 1.8–7.4)
NEUTROPHILS # BLD AUTO: 5.58 K/UL — SIGNIFICANT CHANGE UP (ref 1.8–7.4)
NEUTROPHILS # BLD AUTO: 6.95 K/UL — SIGNIFICANT CHANGE UP (ref 1.8–7.4)
NEUTROPHILS # BLD AUTO: 7.24 K/UL — SIGNIFICANT CHANGE UP (ref 1.8–7.4)
NEUTROPHILS # BLD AUTO: 7.28 K/UL — SIGNIFICANT CHANGE UP (ref 1.8–7.4)
NEUTROPHILS # BLD AUTO: 7.36 K/UL — SIGNIFICANT CHANGE UP (ref 1.8–7.4)
NEUTROPHILS NFR BLD AUTO: 72.4 % — SIGNIFICANT CHANGE UP (ref 43–77)
NEUTROPHILS NFR BLD AUTO: 81.1 % — HIGH (ref 43–77)
NEUTROPHILS NFR BLD AUTO: 85.8 % — HIGH (ref 43–77)
NEUTROPHILS NFR BLD AUTO: 89.2 % — HIGH (ref 43–77)
NEUTROPHILS NFR BLD AUTO: 89.5 % — HIGH (ref 43–77)
NEUTROPHILS NFR BLD AUTO: 92.1 % — HIGH (ref 43–77)
NEUTS BAND # BLD: 0 % — SIGNIFICANT CHANGE UP (ref 0–6)
NEUTS BAND # BLD: 2 % — SIGNIFICANT CHANGE UP (ref 0–6)
NITRITE UR-MCNC: NEGATIVE — SIGNIFICANT CHANGE UP
NRBC FLD-RTO: 1.2 — SIGNIFICANT CHANGE UP
NRBC FLD-RTO: 1.5 — SIGNIFICANT CHANGE UP
NRBC FLD-RTO: 2.1 — SIGNIFICANT CHANGE UP
NRBC FLD-RTO: 4.1 — SIGNIFICANT CHANGE UP
NT-PROBNP SERPL-SCNC: 790 PG/ML — SIGNIFICANT CHANGE UP
OSMOLALITY SERPL: 300 MOSMO/KG — HIGH (ref 275–295)
OSMOLALITY UR: 307 MOSMO/KG — SIGNIFICANT CHANGE UP (ref 50–1200)
OTHER - HEMATOLOGY %: 0 — SIGNIFICANT CHANGE UP
PCO2 BLDA: 51 MMHG — HIGH (ref 35–48)
PCO2 BLDA: 58 MMHG — HIGH (ref 35–48)
PCO2 BLDA: 60 MMHG — HIGH (ref 35–48)
PCO2 BLDA: 60 MMHG — HIGH (ref 35–48)
PCO2 BLDA: 62 MMHG — HIGH (ref 35–48)
PCO2 BLDA: 63 MMHG — HIGH (ref 35–48)
PCO2 BLDA: 64 MMHG — HIGH (ref 35–48)
PCO2 BLDA: 64 MMHG — HIGH (ref 35–48)
PCO2 BLDA: 66 MMHG — HIGH (ref 35–48)
PCO2 BLDA: 66 MMHG — HIGH (ref 35–48)
PCO2 BLDA: 68 MMHG — HIGH (ref 35–48)
PCO2 BLDA: 69 MMHG — HIGH (ref 35–48)
PCO2 BLDA: 69 MMHG — HIGH (ref 35–48)
PCO2 BLDA: 70 MMHG — CRITICAL HIGH (ref 35–48)
PCO2 BLDA: 70 MMHG — CRITICAL HIGH (ref 35–48)
PCO2 BLDA: 71 MMHG — CRITICAL HIGH (ref 35–48)
PCO2 BLDA: 73 MMHG — CRITICAL HIGH (ref 35–48)
PCO2 BLDA: 73 MMHG — CRITICAL HIGH (ref 35–48)
PCO2 BLDA: 74 MMHG — CRITICAL HIGH (ref 35–48)
PCO2 BLDA: 76 MMHG — CRITICAL HIGH (ref 35–48)
PCO2 BLDA: 76 MMHG — CRITICAL HIGH (ref 35–48)
PCO2 BLDA: 78 MMHG — CRITICAL HIGH (ref 35–48)
PCO2 BLDA: 79 MMHG — CRITICAL HIGH (ref 35–48)
PCO2 BLDA: 81 MMHG — CRITICAL HIGH (ref 35–48)
PCO2 BLDA: 88 MMHG — CRITICAL HIGH (ref 35–48)
PCO2 BLDV: 80 MMHG — CRITICAL HIGH (ref 41–51)
PCO2 BLDV: 90 MMHG — CRITICAL HIGH (ref 41–51)
PH BLDA: 7.11 PH — CRITICAL LOW (ref 7.35–7.45)
PH BLDA: 7.12 PH — CRITICAL LOW (ref 7.35–7.45)
PH BLDA: 7.13 PH — CRITICAL LOW (ref 7.35–7.45)
PH BLDA: 7.14 PH — CRITICAL LOW (ref 7.35–7.45)
PH BLDA: 7.14 PH — CRITICAL LOW (ref 7.35–7.45)
PH BLDA: 7.16 PH — CRITICAL LOW (ref 7.35–7.45)
PH BLDA: 7.17 PH — CRITICAL LOW (ref 7.35–7.45)
PH BLDA: 7.18 PH — CRITICAL LOW (ref 7.35–7.45)
PH BLDA: 7.19 PH — CRITICAL LOW (ref 7.35–7.45)
PH BLDA: 7.19 PH — CRITICAL LOW (ref 7.35–7.45)
PH BLDA: 7.22 PH — LOW (ref 7.35–7.45)
PH BLDA: 7.23 PH — LOW (ref 7.35–7.45)
PH BLDA: 7.26 PH — LOW (ref 7.35–7.45)
PH BLDA: 7.27 PH — LOW (ref 7.35–7.45)
PH BLDA: 7.28 PH — LOW (ref 7.35–7.45)
PH BLDA: 7.29 PH — LOW (ref 7.35–7.45)
PH BLDV: 7.14 PH — CRITICAL LOW (ref 7.32–7.43)
PH BLDV: 7.21 PH — LOW (ref 7.32–7.43)
PH UR: 6 — SIGNIFICANT CHANGE UP (ref 4.6–8)
PHOSPHATE SERPL-MCNC: 4.5 MG/DL — SIGNIFICANT CHANGE UP (ref 2.5–4.5)
PHOSPHATE SERPL-MCNC: 5.9 MG/DL — HIGH (ref 2.5–4.5)
PHOSPHATE SERPL-MCNC: 6.4 MG/DL — HIGH (ref 2.5–4.5)
PHOSPHATE SERPL-MCNC: 6.6 MG/DL — HIGH (ref 2.5–4.5)
PHOSPHATE SERPL-MCNC: 7.2 MG/DL — HIGH (ref 2.5–4.5)
PHOSPHATE SERPL-MCNC: 7.4 MG/DL — HIGH (ref 2.5–4.5)
PHOSPHATE SERPL-MCNC: 7.9 MG/DL — HIGH (ref 2.5–4.5)
PHOSPHATE SERPL-MCNC: 8 MG/DL — HIGH (ref 2.5–4.5)
PHOSPHATE SERPL-MCNC: 8 MG/DL — HIGH (ref 2.5–4.5)
PHOSPHATE SERPL-MCNC: 8.2 MG/DL — HIGH (ref 2.5–4.5)
PHOSPHATE SERPL-MCNC: 9.1 MG/DL — HIGH (ref 2.5–4.5)
PLATELET # BLD AUTO: 111 K/UL — LOW (ref 150–400)
PLATELET # BLD AUTO: 143 K/UL — LOW (ref 150–400)
PLATELET # BLD AUTO: 80 K/UL — LOW (ref 150–400)
PLATELET # BLD AUTO: 81 K/UL — LOW (ref 150–400)
PLATELET # BLD AUTO: 82 K/UL — LOW (ref 150–400)
PLATELET # BLD AUTO: 84 K/UL — LOW (ref 150–400)
PLATELET # BLD AUTO: 85 K/UL — LOW (ref 150–400)
PLATELET # BLD AUTO: 85 K/UL — LOW (ref 150–400)
PLATELET # BLD AUTO: 89 K/UL — LOW (ref 150–400)
PLATELET # BLD AUTO: 98 K/UL — LOW (ref 150–400)
PLATELET COUNT - ESTIMATE: SIGNIFICANT CHANGE UP
PLATELET COUNT - ESTIMATE: SIGNIFICANT CHANGE UP
PMV BLD: SIGNIFICANT CHANGE UP FL (ref 7–13)
PO2 BLDA: 101 MMHG — SIGNIFICANT CHANGE UP (ref 83–108)
PO2 BLDA: 103 MMHG — SIGNIFICANT CHANGE UP (ref 83–108)
PO2 BLDA: 103 MMHG — SIGNIFICANT CHANGE UP (ref 83–108)
PO2 BLDA: 104 MMHG — SIGNIFICANT CHANGE UP (ref 83–108)
PO2 BLDA: 104 MMHG — SIGNIFICANT CHANGE UP (ref 83–108)
PO2 BLDA: 110 MMHG — HIGH (ref 83–108)
PO2 BLDA: 117 MMHG — HIGH (ref 83–108)
PO2 BLDA: 117 MMHG — HIGH (ref 83–108)
PO2 BLDA: 118 MMHG — HIGH (ref 83–108)
PO2 BLDA: 122 MMHG — HIGH (ref 83–108)
PO2 BLDA: 135 MMHG — HIGH (ref 83–108)
PO2 BLDA: 137 MMHG — HIGH (ref 83–108)
PO2 BLDA: 148 MMHG — HIGH (ref 83–108)
PO2 BLDA: 152 MMHG — HIGH (ref 83–108)
PO2 BLDA: 173 MMHG — HIGH (ref 83–108)
PO2 BLDA: 192 MMHG — HIGH (ref 83–108)
PO2 BLDA: 47 MMHG — CRITICAL LOW (ref 83–108)
PO2 BLDA: 52 MMHG — LOW (ref 83–108)
PO2 BLDA: 52 MMHG — LOW (ref 83–108)
PO2 BLDA: 54 MMHG — LOW (ref 83–108)
PO2 BLDA: 66 MMHG — LOW (ref 83–108)
PO2 BLDA: 84 MMHG — SIGNIFICANT CHANGE UP (ref 83–108)
PO2 BLDA: 84 MMHG — SIGNIFICANT CHANGE UP (ref 83–108)
PO2 BLDA: 85 MMHG — SIGNIFICANT CHANGE UP (ref 83–108)
PO2 BLDA: 86 MMHG — SIGNIFICANT CHANGE UP (ref 83–108)
PO2 BLDA: 89 MMHG — SIGNIFICANT CHANGE UP (ref 83–108)
PO2 BLDA: 90 MMHG — SIGNIFICANT CHANGE UP (ref 83–108)
PO2 BLDV: 39 MMHG — SIGNIFICANT CHANGE UP (ref 35–40)
PO2 BLDV: 47 MMHG — HIGH (ref 35–40)
POIKILOCYTOSIS BLD QL AUTO: SIGNIFICANT CHANGE UP
POLYCHROMASIA BLD QL SMEAR: SLIGHT — SIGNIFICANT CHANGE UP
POLYCHROMASIA BLD QL SMEAR: SLIGHT — SIGNIFICANT CHANGE UP
POTASSIUM BLDA-SCNC: 3.1 MMOL/L — LOW (ref 3.4–4.5)
POTASSIUM BLDA-SCNC: 3.6 MMOL/L — SIGNIFICANT CHANGE UP (ref 3.4–4.5)
POTASSIUM BLDA-SCNC: 3.9 MMOL/L — SIGNIFICANT CHANGE UP (ref 3.4–4.5)
POTASSIUM BLDA-SCNC: 3.9 MMOL/L — SIGNIFICANT CHANGE UP (ref 3.4–4.5)
POTASSIUM BLDA-SCNC: 4.3 MMOL/L — SIGNIFICANT CHANGE UP (ref 3.4–4.5)
POTASSIUM BLDA-SCNC: 4.3 MMOL/L — SIGNIFICANT CHANGE UP (ref 3.4–4.5)
POTASSIUM BLDA-SCNC: 4.5 MMOL/L — SIGNIFICANT CHANGE UP (ref 3.4–4.5)
POTASSIUM BLDA-SCNC: 4.5 MMOL/L — SIGNIFICANT CHANGE UP (ref 3.4–4.5)
POTASSIUM BLDA-SCNC: 4.6 MMOL/L — HIGH (ref 3.4–4.5)
POTASSIUM BLDA-SCNC: 4.7 MMOL/L — HIGH (ref 3.4–4.5)
POTASSIUM BLDA-SCNC: 4.8 MMOL/L — HIGH (ref 3.4–4.5)
POTASSIUM BLDA-SCNC: 4.8 MMOL/L — HIGH (ref 3.4–4.5)
POTASSIUM BLDA-SCNC: 4.9 MMOL/L — HIGH (ref 3.4–4.5)
POTASSIUM BLDA-SCNC: 5 MMOL/L — HIGH (ref 3.4–4.5)
POTASSIUM BLDA-SCNC: 5 MMOL/L — HIGH (ref 3.4–4.5)
POTASSIUM BLDA-SCNC: 5.2 MMOL/L — HIGH (ref 3.4–4.5)
POTASSIUM BLDA-SCNC: 5.3 MMOL/L — HIGH (ref 3.4–4.5)
POTASSIUM BLDA-SCNC: 5.4 MMOL/L — HIGH (ref 3.4–4.5)
POTASSIUM BLDA-SCNC: 5.4 MMOL/L — HIGH (ref 3.4–4.5)
POTASSIUM BLDA-SCNC: 5.5 MMOL/L — HIGH (ref 3.4–4.5)
POTASSIUM BLDA-SCNC: 5.7 MMOL/L — HIGH (ref 3.4–4.5)
POTASSIUM BLDA-SCNC: 5.7 MMOL/L — HIGH (ref 3.4–4.5)
POTASSIUM BLDA-SCNC: 5.9 MMOL/L — HIGH (ref 3.4–4.5)
POTASSIUM BLDA-SCNC: 6.1 MMOL/L — HIGH (ref 3.4–4.5)
POTASSIUM BLDV-SCNC: 4.9 MMOL/L — HIGH (ref 3.4–4.5)
POTASSIUM BLDV-SCNC: 5.3 MMOL/L — HIGH (ref 3.4–4.5)
POTASSIUM SERPL-MCNC: 3.6 MMOL/L — SIGNIFICANT CHANGE UP (ref 3.5–5.3)
POTASSIUM SERPL-MCNC: 4.1 MMOL/L — SIGNIFICANT CHANGE UP (ref 3.5–5.3)
POTASSIUM SERPL-MCNC: 4.4 MMOL/L — SIGNIFICANT CHANGE UP (ref 3.5–5.3)
POTASSIUM SERPL-MCNC: 4.6 MMOL/L — SIGNIFICANT CHANGE UP (ref 3.5–5.3)
POTASSIUM SERPL-MCNC: 4.8 MMOL/L — SIGNIFICANT CHANGE UP (ref 3.5–5.3)
POTASSIUM SERPL-MCNC: 4.9 MMOL/L — SIGNIFICANT CHANGE UP (ref 3.5–5.3)
POTASSIUM SERPL-MCNC: 5.2 MMOL/L — SIGNIFICANT CHANGE UP (ref 3.5–5.3)
POTASSIUM SERPL-MCNC: 5.3 MMOL/L — SIGNIFICANT CHANGE UP (ref 3.5–5.3)
POTASSIUM SERPL-MCNC: 5.4 MMOL/L — HIGH (ref 3.5–5.3)
POTASSIUM SERPL-MCNC: 5.6 MMOL/L — HIGH (ref 3.5–5.3)
POTASSIUM SERPL-MCNC: 5.9 MMOL/L — HIGH (ref 3.5–5.3)
POTASSIUM SERPL-MCNC: 6.2 MMOL/L — CRITICAL HIGH (ref 3.5–5.3)
POTASSIUM SERPL-MCNC: 6.5 MMOL/L — CRITICAL HIGH (ref 3.5–5.3)
POTASSIUM SERPL-SCNC: 3.6 MMOL/L — SIGNIFICANT CHANGE UP (ref 3.5–5.3)
POTASSIUM SERPL-SCNC: 4.1 MMOL/L — SIGNIFICANT CHANGE UP (ref 3.5–5.3)
POTASSIUM SERPL-SCNC: 4.4 MMOL/L — SIGNIFICANT CHANGE UP (ref 3.5–5.3)
POTASSIUM SERPL-SCNC: 4.6 MMOL/L — SIGNIFICANT CHANGE UP (ref 3.5–5.3)
POTASSIUM SERPL-SCNC: 4.8 MMOL/L — SIGNIFICANT CHANGE UP (ref 3.5–5.3)
POTASSIUM SERPL-SCNC: 4.9 MMOL/L — SIGNIFICANT CHANGE UP (ref 3.5–5.3)
POTASSIUM SERPL-SCNC: 5.2 MMOL/L — SIGNIFICANT CHANGE UP (ref 3.5–5.3)
POTASSIUM SERPL-SCNC: 5.3 MMOL/L — SIGNIFICANT CHANGE UP (ref 3.5–5.3)
POTASSIUM SERPL-SCNC: 5.4 MMOL/L — HIGH (ref 3.5–5.3)
POTASSIUM SERPL-SCNC: 5.6 MMOL/L — HIGH (ref 3.5–5.3)
POTASSIUM SERPL-SCNC: 5.9 MMOL/L — HIGH (ref 3.5–5.3)
POTASSIUM SERPL-SCNC: 6.2 MMOL/L — CRITICAL HIGH (ref 3.5–5.3)
POTASSIUM SERPL-SCNC: 6.5 MMOL/L — CRITICAL HIGH (ref 3.5–5.3)
PREALB SERPL-MCNC: 16 MG/DL — LOW (ref 20–40)
PROMYELOCYTES # FLD: 0 % — SIGNIFICANT CHANGE UP (ref 0–0)
PROT SERPL-MCNC: 5.6 G/DL — LOW (ref 6–8.3)
PROT SERPL-MCNC: 5.6 G/DL — LOW (ref 6–8.3)
PROT SERPL-MCNC: 5.7 G/DL — LOW (ref 6–8.3)
PROT SERPL-MCNC: 5.8 G/DL — LOW (ref 6–8.3)
PROT SERPL-MCNC: 5.8 G/DL — LOW (ref 6–8.3)
PROT SERPL-MCNC: 5.9 G/DL — LOW (ref 6–8.3)
PROT SERPL-MCNC: 6 G/DL — SIGNIFICANT CHANGE UP (ref 6–8.3)
PROT SERPL-MCNC: 6.6 G/DL — SIGNIFICANT CHANGE UP (ref 6–8.3)
PROT SERPL-MCNC: 7.2 G/DL — SIGNIFICANT CHANGE UP (ref 6–8.3)
PROT UR-MCNC: 300 — SIGNIFICANT CHANGE UP
PROTHROM AB SERPL-ACNC: 12.8 SEC — SIGNIFICANT CHANGE UP (ref 9.8–13.1)
RBC # BLD: 5.1 M/UL — SIGNIFICANT CHANGE UP (ref 4.2–5.8)
RBC # BLD: 5.14 M/UL — SIGNIFICANT CHANGE UP (ref 4.2–5.8)
RBC # BLD: 5.15 M/UL — SIGNIFICANT CHANGE UP (ref 4.2–5.8)
RBC # BLD: 5.44 M/UL — SIGNIFICANT CHANGE UP (ref 4.2–5.8)
RBC # BLD: 5.44 M/UL — SIGNIFICANT CHANGE UP (ref 4.2–5.8)
RBC # BLD: 5.47 M/UL — SIGNIFICANT CHANGE UP (ref 4.2–5.8)
RBC # BLD: 5.51 M/UL — SIGNIFICANT CHANGE UP (ref 4.2–5.8)
RBC # BLD: 5.61 M/UL — SIGNIFICANT CHANGE UP (ref 4.2–5.8)
RBC # BLD: 6.08 M/UL — HIGH (ref 4.2–5.8)
RBC # BLD: 6.24 M/UL — HIGH (ref 4.2–5.8)
RBC # FLD: 17.4 % — HIGH (ref 10.3–14.5)
RBC # FLD: 17.4 % — HIGH (ref 10.3–14.5)
RBC # FLD: 17.6 % — HIGH (ref 10.3–14.5)
RBC # FLD: 17.6 % — HIGH (ref 10.3–14.5)
RBC # FLD: 17.9 % — HIGH (ref 10.3–14.5)
RBC # FLD: 17.9 % — HIGH (ref 10.3–14.5)
RBC # FLD: 18.1 % — HIGH (ref 10.3–14.5)
RBC # FLD: 18.7 % — HIGH (ref 10.3–14.5)
RBC CASTS # UR COMP ASSIST: SIGNIFICANT CHANGE UP (ref 0–?)
RSV RNA SPEC QL NAA+PROBE: NOT DETECTED — SIGNIFICANT CHANGE UP
RV+EV RNA SPEC QL NAA+PROBE: NOT DETECTED — SIGNIFICANT CHANGE UP
SAO2 % BLDA: 77.1 % — LOW (ref 95–99)
SAO2 % BLDA: 77.4 % — LOW (ref 95–99)
SAO2 % BLDA: 78.4 % — LOW (ref 95–99)
SAO2 % BLDA: 83.3 % — LOW (ref 95–99)
SAO2 % BLDA: 87.8 % — LOW (ref 95–99)
SAO2 % BLDA: 93.9 % — LOW (ref 95–99)
SAO2 % BLDA: 94.5 % — LOW (ref 95–99)
SAO2 % BLDA: 94.8 % — LOW (ref 95–99)
SAO2 % BLDA: 95.6 % — SIGNIFICANT CHANGE UP (ref 95–99)
SAO2 % BLDA: 95.7 % — SIGNIFICANT CHANGE UP (ref 95–99)
SAO2 % BLDA: 96.1 % — SIGNIFICANT CHANGE UP (ref 95–99)
SAO2 % BLDA: 96.3 % — SIGNIFICANT CHANGE UP (ref 95–99)
SAO2 % BLDA: 96.4 % — SIGNIFICANT CHANGE UP (ref 95–99)
SAO2 % BLDA: 96.6 % — SIGNIFICANT CHANGE UP (ref 95–99)
SAO2 % BLDA: 96.7 % — SIGNIFICANT CHANGE UP (ref 95–99)
SAO2 % BLDA: 97 % — SIGNIFICANT CHANGE UP (ref 95–99)
SAO2 % BLDA: 97.1 % — SIGNIFICANT CHANGE UP (ref 95–99)
SAO2 % BLDA: 97.2 % — SIGNIFICANT CHANGE UP (ref 95–99)
SAO2 % BLDA: 97.3 % — SIGNIFICANT CHANGE UP (ref 95–99)
SAO2 % BLDA: 97.4 % — SIGNIFICANT CHANGE UP (ref 95–99)
SAO2 % BLDA: 97.6 % — SIGNIFICANT CHANGE UP (ref 95–99)
SAO2 % BLDA: 97.9 % — SIGNIFICANT CHANGE UP (ref 95–99)
SAO2 % BLDA: 98.1 % — SIGNIFICANT CHANGE UP (ref 95–99)
SAO2 % BLDA: 98.2 % — SIGNIFICANT CHANGE UP (ref 95–99)
SAO2 % BLDA: 98.5 % — SIGNIFICANT CHANGE UP (ref 95–99)
SAO2 % BLDA: 98.6 % — SIGNIFICANT CHANGE UP (ref 95–99)
SAO2 % BLDA: 98.7 % — SIGNIFICANT CHANGE UP (ref 95–99)
SAO2 % BLDV: 60.1 % — SIGNIFICANT CHANGE UP (ref 60–85)
SAO2 % BLDV: 72 % — SIGNIFICANT CHANGE UP (ref 60–85)
SODIUM BLDA-SCNC: 108 MMOL/L — CRITICAL LOW (ref 136–146)
SODIUM BLDA-SCNC: 116 MMOL/L — CRITICAL LOW (ref 136–146)
SODIUM BLDA-SCNC: 117 MMOL/L — CRITICAL LOW (ref 136–146)
SODIUM BLDA-SCNC: 118 MMOL/L — CRITICAL LOW (ref 136–146)
SODIUM BLDA-SCNC: 121 MMOL/L — LOW (ref 136–146)
SODIUM BLDA-SCNC: 125 MMOL/L — LOW (ref 136–146)
SODIUM BLDA-SCNC: 126 MMOL/L — LOW (ref 136–146)
SODIUM BLDA-SCNC: 127 MMOL/L — LOW (ref 136–146)
SODIUM BLDA-SCNC: 128 MMOL/L — LOW (ref 136–146)
SODIUM BLDA-SCNC: 128 MMOL/L — LOW (ref 136–146)
SODIUM BLDA-SCNC: 129 MMOL/L — LOW (ref 136–146)
SODIUM BLDA-SCNC: 129 MMOL/L — LOW (ref 136–146)
SODIUM BLDA-SCNC: 130 MMOL/L — LOW (ref 136–146)
SODIUM BLDA-SCNC: 130 MMOL/L — LOW (ref 136–146)
SODIUM BLDA-SCNC: 131 MMOL/L — LOW (ref 136–146)
SODIUM BLDA-SCNC: 132 MMOL/L — LOW (ref 136–146)
SODIUM BLDA-SCNC: 132 MMOL/L — LOW (ref 136–146)
SODIUM BLDA-SCNC: 133 MMOL/L — LOW (ref 136–146)
SODIUM BLDA-SCNC: 133 MMOL/L — LOW (ref 136–146)
SODIUM BLDA-SCNC: 134 MMOL/L — LOW (ref 136–146)
SODIUM BLDA-SCNC: 134 MMOL/L — LOW (ref 136–146)
SODIUM BLDA-SCNC: 137 MMOL/L — SIGNIFICANT CHANGE UP (ref 136–146)
SODIUM SERPL-SCNC: 122 MMOL/L — LOW (ref 135–145)
SODIUM SERPL-SCNC: 124 MMOL/L — LOW (ref 135–145)
SODIUM SERPL-SCNC: 125 MMOL/L — LOW (ref 135–145)
SODIUM SERPL-SCNC: 128 MMOL/L — LOW (ref 135–145)
SODIUM SERPL-SCNC: 132 MMOL/L — LOW (ref 135–145)
SODIUM SERPL-SCNC: 133 MMOL/L — LOW (ref 135–145)
SODIUM SERPL-SCNC: 136 MMOL/L — SIGNIFICANT CHANGE UP (ref 135–145)
SODIUM SERPL-SCNC: 137 MMOL/L — SIGNIFICANT CHANGE UP (ref 135–145)
SODIUM SERPL-SCNC: 138 MMOL/L — SIGNIFICANT CHANGE UP (ref 135–145)
SODIUM SERPL-SCNC: 140 MMOL/L — SIGNIFICANT CHANGE UP (ref 135–145)
SODIUM SERPL-SCNC: 141 MMOL/L — SIGNIFICANT CHANGE UP (ref 135–145)
SODIUM UR-SCNC: < 20 MEQ/L — SIGNIFICANT CHANGE UP
SP GR SPEC: 1.02 — SIGNIFICANT CHANGE UP (ref 1–1.03)
SPECIMEN SOURCE: SIGNIFICANT CHANGE UP
TARGETS BLD QL SMEAR: SIGNIFICANT CHANGE UP
TARGETS BLD QL SMEAR: SLIGHT — SIGNIFICANT CHANGE UP
UROBILINOGEN FLD QL: 2 E.U. — SIGNIFICANT CHANGE UP (ref 0.1–0.2)
VANCOMYCIN FLD-MCNC: 16.1 UG/ML — SIGNIFICANT CHANGE UP
VARIANT LYMPHS # BLD: 0 % — SIGNIFICANT CHANGE UP
VARIANT LYMPHS # BLD: 3 % — SIGNIFICANT CHANGE UP
WBC # BLD: 12.26 K/UL — HIGH (ref 3.8–10.5)
WBC # BLD: 16.41 K/UL — HIGH (ref 3.8–10.5)
WBC # BLD: 6.13 K/UL — SIGNIFICANT CHANGE UP (ref 3.8–10.5)
WBC # BLD: 6.42 K/UL — SIGNIFICANT CHANGE UP (ref 3.8–10.5)
WBC # BLD: 7.7 K/UL — SIGNIFICANT CHANGE UP (ref 3.8–10.5)
WBC # BLD: 7.79 K/UL — SIGNIFICANT CHANGE UP (ref 3.8–10.5)
WBC # BLD: 7.91 K/UL — SIGNIFICANT CHANGE UP (ref 3.8–10.5)
WBC # BLD: 8.09 K/UL — SIGNIFICANT CHANGE UP (ref 3.8–10.5)
WBC # BLD: 8.71 K/UL — SIGNIFICANT CHANGE UP (ref 3.8–10.5)
WBC # BLD: 9.08 K/UL — SIGNIFICANT CHANGE UP (ref 3.8–10.5)
WBC # FLD AUTO: 12.26 K/UL — HIGH (ref 3.8–10.5)
WBC # FLD AUTO: 16.41 K/UL — HIGH (ref 3.8–10.5)
WBC # FLD AUTO: 6.13 K/UL — SIGNIFICANT CHANGE UP (ref 3.8–10.5)
WBC # FLD AUTO: 6.42 K/UL — SIGNIFICANT CHANGE UP (ref 3.8–10.5)
WBC # FLD AUTO: 7.7 K/UL — SIGNIFICANT CHANGE UP (ref 3.8–10.5)
WBC # FLD AUTO: 7.79 K/UL — SIGNIFICANT CHANGE UP (ref 3.8–10.5)
WBC # FLD AUTO: 7.91 K/UL — SIGNIFICANT CHANGE UP (ref 3.8–10.5)
WBC # FLD AUTO: 8.09 K/UL — SIGNIFICANT CHANGE UP (ref 3.8–10.5)
WBC # FLD AUTO: 8.71 K/UL — SIGNIFICANT CHANGE UP (ref 3.8–10.5)
WBC # FLD AUTO: 9.08 K/UL — SIGNIFICANT CHANGE UP (ref 3.8–10.5)
WBC UR QL: SIGNIFICANT CHANGE UP (ref 0–?)

## 2017-01-01 PROCEDURE — 72170 X-RAY EXAM OF PELVIS: CPT | Mod: 26

## 2017-01-01 PROCEDURE — 99291 CRITICAL CARE FIRST HOUR: CPT

## 2017-01-01 PROCEDURE — 71010: CPT | Mod: 26

## 2017-01-01 PROCEDURE — 99291 CRITICAL CARE FIRST HOUR: CPT | Mod: 25

## 2017-01-01 PROCEDURE — 99497 ADVNCD CARE PLAN 30 MIN: CPT | Mod: 25

## 2017-01-01 PROCEDURE — 99223 1ST HOSP IP/OBS HIGH 75: CPT

## 2017-01-01 PROCEDURE — 74000: CPT | Mod: 26

## 2017-01-01 PROCEDURE — 74000: CPT | Mod: 26,76

## 2017-01-01 PROCEDURE — 31500 INSERT EMERGENCY AIRWAY: CPT | Mod: GC

## 2017-01-01 RX ORDER — BUDESONIDE AND FORMOTEROL FUMARATE DIHYDRATE 160; 4.5 UG/1; UG/1
2 AEROSOL RESPIRATORY (INHALATION)
Qty: 0 | Refills: 0 | Status: DISCONTINUED | OUTPATIENT
Start: 2017-01-01 | End: 2017-01-01

## 2017-01-01 RX ORDER — ERYTHROMYCIN ETHYLSUCCINATE 400 MG
250 TABLET ORAL ONCE
Qty: 0 | Refills: 0 | Status: COMPLETED | OUTPATIENT
Start: 2017-01-01 | End: 2017-01-01

## 2017-01-01 RX ORDER — INSULIN LISPRO 100/ML
VIAL (ML) SUBCUTANEOUS EVERY 6 HOURS
Qty: 0 | Refills: 0 | Status: DISCONTINUED | OUTPATIENT
Start: 2017-01-01 | End: 2017-01-01

## 2017-01-01 RX ORDER — CISATRACURIUM BESYLATE 2 MG/ML
20 INJECTION INTRAVENOUS ONCE
Qty: 0 | Refills: 0 | Status: COMPLETED | OUTPATIENT
Start: 2017-01-01 | End: 2017-01-01

## 2017-01-01 RX ORDER — DOCUSATE SODIUM 100 MG
100 CAPSULE ORAL DAILY
Qty: 0 | Refills: 0 | Status: DISCONTINUED | OUTPATIENT
Start: 2017-01-01 | End: 2017-01-01

## 2017-01-01 RX ORDER — DEXTROSE 50 % IN WATER 50 %
1 SYRINGE (ML) INTRAVENOUS ONCE
Qty: 0 | Refills: 0 | Status: DISCONTINUED | OUTPATIENT
Start: 2017-01-01 | End: 2017-01-01

## 2017-01-01 RX ORDER — DOCUSATE SODIUM 100 MG
100 CAPSULE ORAL THREE TIMES A DAY
Qty: 0 | Refills: 0 | Status: DISCONTINUED | OUTPATIENT
Start: 2017-01-01 | End: 2017-01-01

## 2017-01-01 RX ORDER — HEPARIN SODIUM 5000 [USP'U]/ML
5000 INJECTION INTRAVENOUS; SUBCUTANEOUS EVERY 8 HOURS
Qty: 0 | Refills: 0 | Status: DISCONTINUED | OUTPATIENT
Start: 2017-01-01 | End: 2017-01-01

## 2017-01-01 RX ORDER — CISATRACURIUM BESYLATE 2 MG/ML
2 INJECTION INTRAVENOUS
Qty: 200 | Refills: 0 | Status: DISCONTINUED | OUTPATIENT
Start: 2017-01-01 | End: 2017-01-01

## 2017-01-01 RX ORDER — ROBINUL 0.2 MG/ML
0.4 INJECTION INTRAMUSCULAR; INTRAVENOUS EVERY 6 HOURS
Qty: 0 | Refills: 0 | Status: DISCONTINUED | OUTPATIENT
Start: 2017-01-01 | End: 2017-01-01

## 2017-01-01 RX ORDER — DEXTROSE 50 % IN WATER 50 %
12.5 SYRINGE (ML) INTRAVENOUS ONCE
Qty: 0 | Refills: 0 | Status: DISCONTINUED | OUTPATIENT
Start: 2017-01-01 | End: 2017-01-01

## 2017-01-01 RX ORDER — SODIUM CHLORIDE 9 MG/ML
3 INJECTION INTRAMUSCULAR; INTRAVENOUS; SUBCUTANEOUS EVERY 8 HOURS
Qty: 0 | Refills: 0 | Status: DISCONTINUED | OUTPATIENT
Start: 2017-01-01 | End: 2017-01-01

## 2017-01-01 RX ORDER — PANTOPRAZOLE SODIUM 20 MG/1
40 TABLET, DELAYED RELEASE ORAL DAILY
Qty: 0 | Refills: 0 | Status: DISCONTINUED | OUTPATIENT
Start: 2017-01-01 | End: 2017-01-01

## 2017-01-01 RX ORDER — METOCLOPRAMIDE HCL 10 MG
5 TABLET ORAL ONCE
Qty: 0 | Refills: 0 | Status: DISCONTINUED | OUTPATIENT
Start: 2017-01-01 | End: 2017-01-01

## 2017-01-01 RX ORDER — SENNA PLUS 8.6 MG/1
2 TABLET ORAL AT BEDTIME
Qty: 0 | Refills: 0 | Status: DISCONTINUED | OUTPATIENT
Start: 2017-01-01 | End: 2017-01-01

## 2017-01-01 RX ORDER — VANCOMYCIN HCL 1 G
1000 VIAL (EA) INTRAVENOUS ONCE
Qty: 0 | Refills: 0 | Status: COMPLETED | OUTPATIENT
Start: 2017-01-01 | End: 2017-01-01

## 2017-01-01 RX ORDER — ACETAMINOPHEN 500 MG
650 TABLET ORAL EVERY 6 HOURS
Qty: 0 | Refills: 0 | Status: DISCONTINUED | OUTPATIENT
Start: 2017-01-01 | End: 2017-01-01

## 2017-01-01 RX ORDER — ACETAMINOPHEN 500 MG
1000 TABLET ORAL ONCE
Qty: 0 | Refills: 0 | Status: COMPLETED | OUTPATIENT
Start: 2017-01-01 | End: 2017-01-01

## 2017-01-01 RX ORDER — PANTOPRAZOLE SODIUM 20 MG/1
40 TABLET, DELAYED RELEASE ORAL EVERY 12 HOURS
Qty: 0 | Refills: 0 | Status: DISCONTINUED | OUTPATIENT
Start: 2017-01-01 | End: 2017-01-01

## 2017-01-01 RX ORDER — HYDROMORPHONE HYDROCHLORIDE 2 MG/ML
2 INJECTION INTRAMUSCULAR; INTRAVENOUS; SUBCUTANEOUS
Qty: 100 | Refills: 0 | Status: DISCONTINUED | OUTPATIENT
Start: 2017-01-01 | End: 2017-01-01

## 2017-01-01 RX ORDER — IPRATROPIUM/ALBUTEROL SULFATE 18-103MCG
3 AEROSOL WITH ADAPTER (GRAM) INHALATION EVERY 6 HOURS
Qty: 0 | Refills: 0 | Status: DISCONTINUED | OUTPATIENT
Start: 2017-01-01 | End: 2017-01-01

## 2017-01-01 RX ORDER — INSULIN LISPRO 100/ML
VIAL (ML) SUBCUTANEOUS
Qty: 0 | Refills: 0 | Status: DISCONTINUED | OUTPATIENT
Start: 2017-01-01 | End: 2017-01-01

## 2017-01-01 RX ORDER — SODIUM CHLORIDE 9 MG/ML
1000 INJECTION, SOLUTION INTRAVENOUS
Qty: 0 | Refills: 0 | Status: DISCONTINUED | OUTPATIENT
Start: 2017-01-01 | End: 2017-01-01

## 2017-01-01 RX ORDER — CHLORHEXIDINE GLUCONATE 213 G/1000ML
1 SOLUTION TOPICAL DAILY
Qty: 0 | Refills: 0 | Status: DISCONTINUED | OUTPATIENT
Start: 2017-01-01 | End: 2017-01-01

## 2017-01-01 RX ORDER — CEFEPIME 1 G/1
1000 INJECTION, POWDER, FOR SOLUTION INTRAMUSCULAR; INTRAVENOUS ONCE
Qty: 0 | Refills: 0 | Status: COMPLETED | OUTPATIENT
Start: 2017-01-01 | End: 2017-01-01

## 2017-01-01 RX ORDER — ISOSORBIDE MONONITRATE 60 MG/1
60 TABLET, EXTENDED RELEASE ORAL DAILY
Qty: 0 | Refills: 0 | Status: DISCONTINUED | OUTPATIENT
Start: 2017-01-01 | End: 2017-01-01

## 2017-01-01 RX ORDER — MEROPENEM 1 G/30ML
INJECTION INTRAVENOUS
Qty: 0 | Refills: 0 | Status: DISCONTINUED | OUTPATIENT
Start: 2017-01-01 | End: 2017-01-01

## 2017-01-01 RX ORDER — ALBUTEROL 90 UG/1
2 AEROSOL, METERED ORAL EVERY 6 HOURS
Qty: 0 | Refills: 0 | Status: DISCONTINUED | OUTPATIENT
Start: 2017-01-01 | End: 2017-01-01

## 2017-01-01 RX ORDER — IPRATROPIUM BROMIDE 0.2 MG/ML
1 SOLUTION, NON-ORAL INHALATION EVERY 6 HOURS
Qty: 0 | Refills: 0 | Status: DISCONTINUED | OUTPATIENT
Start: 2017-01-01 | End: 2017-01-01

## 2017-01-01 RX ORDER — MULTIVIT-MIN/FERROUS GLUCONATE 9 MG/15 ML
0 LIQUID (ML) ORAL
Qty: 0 | Refills: 0 | COMMUNITY

## 2017-01-01 RX ORDER — HUMAN INSULIN 100 [IU]/ML
9 INJECTION, SUSPENSION SUBCUTANEOUS EVERY 6 HOURS
Qty: 0 | Refills: 0 | Status: DISCONTINUED | OUTPATIENT
Start: 2017-01-01 | End: 2017-01-01

## 2017-01-01 RX ORDER — INSULIN GLARGINE 100 [IU]/ML
5 INJECTION, SOLUTION SUBCUTANEOUS ONCE
Qty: 0 | Refills: 0 | Status: COMPLETED | OUTPATIENT
Start: 2017-01-01 | End: 2017-01-01

## 2017-01-01 RX ORDER — DEXMEDETOMIDINE HYDROCHLORIDE IN 0.9% SODIUM CHLORIDE 4 UG/ML
0.2 INJECTION INTRAVENOUS
Qty: 200 | Refills: 0 | Status: DISCONTINUED | OUTPATIENT
Start: 2017-01-01 | End: 2017-01-01

## 2017-01-01 RX ORDER — CALCIUM GLUCONATE 100 MG/ML
1 VIAL (ML) INTRAVENOUS ONCE
Qty: 0 | Refills: 0 | Status: COMPLETED | OUTPATIENT
Start: 2017-01-01 | End: 2017-01-01

## 2017-01-01 RX ORDER — PROPOFOL 10 MG/ML
40 INJECTION, EMULSION INTRAVENOUS ONCE
Qty: 0 | Refills: 0 | Status: COMPLETED | OUTPATIENT
Start: 2017-01-01 | End: 2017-01-01

## 2017-01-01 RX ORDER — ASPIRIN/CALCIUM CARB/MAGNESIUM 324 MG
81 TABLET ORAL DAILY
Qty: 0 | Refills: 0 | Status: DISCONTINUED | OUTPATIENT
Start: 2017-01-01 | End: 2017-01-01

## 2017-01-01 RX ORDER — DEXTROSE 50 % IN WATER 50 %
25 SYRINGE (ML) INTRAVENOUS ONCE
Qty: 0 | Refills: 0 | Status: DISCONTINUED | OUTPATIENT
Start: 2017-01-01 | End: 2017-01-01

## 2017-01-01 RX ORDER — METOCLOPRAMIDE HCL 10 MG
5 TABLET ORAL ONCE
Qty: 0 | Refills: 0 | Status: COMPLETED | OUTPATIENT
Start: 2017-01-01 | End: 2017-01-01

## 2017-01-01 RX ORDER — HUMAN INSULIN 100 [IU]/ML
6 INJECTION, SUSPENSION SUBCUTANEOUS ONCE
Qty: 0 | Refills: 0 | Status: COMPLETED | OUTPATIENT
Start: 2017-01-01 | End: 2017-01-01

## 2017-01-01 RX ORDER — MEROPENEM 1 G/30ML
500 INJECTION INTRAVENOUS EVERY 24 HOURS
Qty: 0 | Refills: 0 | Status: DISCONTINUED | OUTPATIENT
Start: 2017-01-01 | End: 2017-01-01

## 2017-01-01 RX ORDER — ACETAMINOPHEN 500 MG
650 TABLET ORAL ONCE
Qty: 0 | Refills: 0 | Status: COMPLETED | OUTPATIENT
Start: 2017-01-01 | End: 2017-01-01

## 2017-01-01 RX ORDER — GLUCAGON INJECTION, SOLUTION 0.5 MG/.1ML
1 INJECTION, SOLUTION SUBCUTANEOUS ONCE
Qty: 0 | Refills: 0 | Status: DISCONTINUED | OUTPATIENT
Start: 2017-01-01 | End: 2017-01-01

## 2017-01-01 RX ORDER — INSULIN HUMAN 100 [IU]/ML
10 INJECTION, SOLUTION SUBCUTANEOUS ONCE
Qty: 0 | Refills: 0 | Status: COMPLETED | OUTPATIENT
Start: 2017-01-01 | End: 2017-01-01

## 2017-01-01 RX ORDER — MONTELUKAST 4 MG/1
10 TABLET, CHEWABLE ORAL DAILY
Qty: 0 | Refills: 0 | Status: DISCONTINUED | OUTPATIENT
Start: 2017-01-01 | End: 2017-01-01

## 2017-01-01 RX ORDER — LEVOTHYROXINE SODIUM 125 MCG
37.5 TABLET ORAL DAILY
Qty: 0 | Refills: 0 | Status: DISCONTINUED | OUTPATIENT
Start: 2017-01-01 | End: 2017-01-01

## 2017-01-01 RX ORDER — METOCLOPRAMIDE HCL 10 MG
5 TABLET ORAL EVERY 8 HOURS
Qty: 0 | Refills: 0 | Status: DISCONTINUED | OUTPATIENT
Start: 2017-01-01 | End: 2017-01-01

## 2017-01-01 RX ORDER — AMLODIPINE BESYLATE 2.5 MG/1
10 TABLET ORAL DAILY
Qty: 0 | Refills: 0 | Status: DISCONTINUED | OUTPATIENT
Start: 2017-01-01 | End: 2017-01-01

## 2017-01-01 RX ORDER — PROPOFOL 10 MG/ML
50 INJECTION, EMULSION INTRAVENOUS
Qty: 1000 | Refills: 0 | Status: DISCONTINUED | OUTPATIENT
Start: 2017-01-01 | End: 2017-01-01

## 2017-01-01 RX ORDER — TIOTROPIUM BROMIDE 18 UG/1
2 CAPSULE ORAL; RESPIRATORY (INHALATION)
Qty: 0 | Refills: 0 | COMMUNITY

## 2017-01-01 RX ORDER — NOREPINEPHRINE BITARTRATE/D5W 8 MG/250ML
0.05 PLASTIC BAG, INJECTION (ML) INTRAVENOUS
Qty: 16 | Refills: 0 | Status: DISCONTINUED | OUTPATIENT
Start: 2017-01-01 | End: 2017-01-01

## 2017-01-01 RX ORDER — HUMAN INSULIN 100 [IU]/ML
2 INJECTION, SUSPENSION SUBCUTANEOUS EVERY 6 HOURS
Qty: 0 | Refills: 0 | Status: DISCONTINUED | OUTPATIENT
Start: 2017-01-01 | End: 2017-01-01

## 2017-01-01 RX ORDER — NITROGLYCERIN 6.5 MG
1 CAPSULE, EXTENDED RELEASE ORAL ONCE
Qty: 0 | Refills: 0 | Status: COMPLETED | OUTPATIENT
Start: 2017-01-01 | End: 2017-01-01

## 2017-01-01 RX ORDER — CARVEDILOL PHOSPHATE 80 MG/1
25 CAPSULE, EXTENDED RELEASE ORAL EVERY 12 HOURS
Qty: 0 | Refills: 0 | Status: DISCONTINUED | OUTPATIENT
Start: 2017-01-01 | End: 2017-01-01

## 2017-01-01 RX ORDER — SODIUM BICARBONATE 1 MEQ/ML
650 SYRINGE (ML) INTRAVENOUS EVERY 8 HOURS
Qty: 0 | Refills: 0 | Status: DISCONTINUED | OUTPATIENT
Start: 2017-01-01 | End: 2017-01-01

## 2017-01-01 RX ORDER — MIDAZOLAM HYDROCHLORIDE 1 MG/ML
2 INJECTION, SOLUTION INTRAMUSCULAR; INTRAVENOUS ONCE
Qty: 0 | Refills: 0 | Status: DISCONTINUED | OUTPATIENT
Start: 2017-01-01 | End: 2017-01-01

## 2017-01-01 RX ORDER — MIDAZOLAM HYDROCHLORIDE 1 MG/ML
3 INJECTION, SOLUTION INTRAMUSCULAR; INTRAVENOUS
Qty: 100 | Refills: 0 | Status: DISCONTINUED | OUTPATIENT
Start: 2017-01-01 | End: 2017-01-01

## 2017-01-01 RX ORDER — LACTULOSE 10 G/15ML
15 SOLUTION ORAL EVERY 8 HOURS
Qty: 0 | Refills: 0 | Status: DISCONTINUED | OUTPATIENT
Start: 2017-01-01 | End: 2017-01-01

## 2017-01-01 RX ORDER — ERYTHROMYCIN ETHYLSUCCINATE 400 MG
250 TABLET ORAL EVERY 6 HOURS
Qty: 0 | Refills: 0 | Status: DISCONTINUED | OUTPATIENT
Start: 2017-01-01 | End: 2017-01-01

## 2017-01-01 RX ORDER — ASPIRIN/CALCIUM CARB/MAGNESIUM 324 MG
1 TABLET ORAL
Qty: 0 | Refills: 0 | COMMUNITY

## 2017-01-01 RX ORDER — ATORVASTATIN CALCIUM 80 MG/1
40 TABLET, FILM COATED ORAL AT BEDTIME
Qty: 0 | Refills: 0 | Status: DISCONTINUED | OUTPATIENT
Start: 2017-01-01 | End: 2017-01-01

## 2017-01-01 RX ORDER — ATROPINE SULFATE 0.1 MG/ML
0.5 SYRINGE (ML) INJECTION ONCE
Qty: 0 | Refills: 0 | Status: COMPLETED | OUTPATIENT
Start: 2017-01-01 | End: 2017-01-01

## 2017-01-01 RX ORDER — CIPROFLOXACIN LACTATE 400MG/40ML
400 VIAL (ML) INTRAVENOUS ONCE
Qty: 0 | Refills: 0 | Status: COMPLETED | OUTPATIENT
Start: 2017-01-01 | End: 2017-01-01

## 2017-01-01 RX ORDER — INSULIN GLARGINE 100 [IU]/ML
5 INJECTION, SOLUTION SUBCUTANEOUS AT BEDTIME
Qty: 0 | Refills: 0 | Status: DISCONTINUED | OUTPATIENT
Start: 2017-01-01 | End: 2017-01-01

## 2017-01-01 RX ORDER — SODIUM BICARBONATE 1 MEQ/ML
50 SYRINGE (ML) INTRAVENOUS ONCE
Qty: 0 | Refills: 0 | Status: COMPLETED | OUTPATIENT
Start: 2017-01-01 | End: 2017-01-01

## 2017-01-01 RX ORDER — METOCLOPRAMIDE HCL 10 MG
10 TABLET ORAL ONCE
Qty: 0 | Refills: 0 | Status: COMPLETED | OUTPATIENT
Start: 2017-01-01 | End: 2017-01-01

## 2017-01-01 RX ORDER — MULTIVIT-MIN/FERROUS GLUCONATE 9 MG/15 ML
1 LIQUID (ML) ORAL DAILY
Qty: 0 | Refills: 0 | Status: DISCONTINUED | OUTPATIENT
Start: 2017-01-01 | End: 2017-01-01

## 2017-01-01 RX ORDER — HYDROMORPHONE HYDROCHLORIDE 2 MG/ML
1 INJECTION INTRAMUSCULAR; INTRAVENOUS; SUBCUTANEOUS ONCE
Qty: 0 | Refills: 0 | Status: DISCONTINUED | OUTPATIENT
Start: 2017-01-01 | End: 2017-01-01

## 2017-01-01 RX ORDER — AZITHROMYCIN 500 MG/1
500 TABLET, FILM COATED ORAL ONCE
Qty: 0 | Refills: 0 | Status: COMPLETED | OUTPATIENT
Start: 2017-01-01 | End: 2017-01-01

## 2017-01-01 RX ORDER — LEVOTHYROXINE SODIUM 125 MCG
37.5 TABLET ORAL ONCE
Qty: 0 | Refills: 0 | Status: COMPLETED | OUTPATIENT
Start: 2017-01-01 | End: 2017-01-01

## 2017-01-01 RX ORDER — NOREPINEPHRINE BITARTRATE/D5W 8 MG/250ML
0.03 PLASTIC BAG, INJECTION (ML) INTRAVENOUS
Qty: 8 | Refills: 0 | Status: DISCONTINUED | OUTPATIENT
Start: 2017-01-01 | End: 2017-01-01

## 2017-01-01 RX ORDER — CALCIUM ACETATE 667 MG
1 TABLET ORAL
Qty: 0 | Refills: 0 | COMMUNITY

## 2017-01-01 RX ORDER — MEROPENEM 1 G/30ML
500 INJECTION INTRAVENOUS ONCE
Qty: 0 | Refills: 0 | Status: COMPLETED | OUTPATIENT
Start: 2017-01-01 | End: 2017-01-01

## 2017-01-01 RX ORDER — SEVELAMER CARBONATE 2400 MG/1
1600 POWDER, FOR SUSPENSION ORAL EVERY 8 HOURS
Qty: 0 | Refills: 0 | Status: DISCONTINUED | OUTPATIENT
Start: 2017-01-01 | End: 2017-01-01

## 2017-01-01 RX ORDER — METOCLOPRAMIDE HCL 10 MG
10 TABLET ORAL EVERY 8 HOURS
Qty: 0 | Refills: 0 | Status: DISCONTINUED | OUTPATIENT
Start: 2017-01-01 | End: 2017-01-01

## 2017-01-01 RX ORDER — INSULIN HUMAN 100 [IU]/ML
3 INJECTION, SOLUTION SUBCUTANEOUS
Qty: 100 | Refills: 0 | Status: DISCONTINUED | OUTPATIENT
Start: 2017-01-01 | End: 2017-01-01

## 2017-01-01 RX ORDER — HUMAN INSULIN 100 [IU]/ML
6 INJECTION, SUSPENSION SUBCUTANEOUS EVERY 6 HOURS
Qty: 0 | Refills: 0 | Status: DISCONTINUED | OUTPATIENT
Start: 2017-01-01 | End: 2017-01-01

## 2017-01-01 RX ORDER — FENTANYL CITRATE 50 UG/ML
2 INJECTION INTRAVENOUS
Qty: 2500 | Refills: 0 | Status: DISCONTINUED | OUTPATIENT
Start: 2017-01-01 | End: 2017-01-01

## 2017-01-01 RX ORDER — CISATRACURIUM BESYLATE 2 MG/ML
1 INJECTION INTRAVENOUS
Qty: 200 | Refills: 0 | Status: DISCONTINUED | OUTPATIENT
Start: 2017-01-01 | End: 2017-01-01

## 2017-01-01 RX ORDER — FENTANYL CITRATE 50 UG/ML
100 INJECTION INTRAVENOUS ONCE
Qty: 0 | Refills: 0 | Status: DISCONTINUED | OUTPATIENT
Start: 2017-01-01 | End: 2017-01-01

## 2017-01-01 RX ORDER — AZITHROMYCIN 500 MG/1
500 TABLET, FILM COATED ORAL EVERY 24 HOURS
Qty: 0 | Refills: 0 | Status: DISCONTINUED | OUTPATIENT
Start: 2017-01-01 | End: 2017-01-01

## 2017-01-01 RX ORDER — CEFTRIAXONE 500 MG/1
1 INJECTION, POWDER, FOR SOLUTION INTRAMUSCULAR; INTRAVENOUS EVERY 24 HOURS
Qty: 0 | Refills: 0 | Status: DISCONTINUED | OUTPATIENT
Start: 2017-01-01 | End: 2017-01-01

## 2017-01-01 RX ORDER — LEVOTHYROXINE SODIUM 125 MCG
75 TABLET ORAL DAILY
Qty: 0 | Refills: 0 | Status: DISCONTINUED | OUTPATIENT
Start: 2017-01-01 | End: 2017-01-01

## 2017-01-01 RX ORDER — CALCIUM ACETATE 667 MG
1334 TABLET ORAL
Qty: 0 | Refills: 0 | Status: DISCONTINUED | OUTPATIENT
Start: 2017-01-01 | End: 2017-01-01

## 2017-01-01 RX ORDER — ASCORBIC ACID 60 MG
500 TABLET,CHEWABLE ORAL DAILY
Qty: 0 | Refills: 0 | Status: DISCONTINUED | OUTPATIENT
Start: 2017-01-01 | End: 2017-01-01

## 2017-01-01 RX ORDER — ERYTHROMYCIN ETHYLSUCCINATE 400 MG
TABLET ORAL
Qty: 0 | Refills: 0 | Status: DISCONTINUED | OUTPATIENT
Start: 2017-01-01 | End: 2017-01-01

## 2017-01-01 RX ORDER — TIOTROPIUM BROMIDE 18 UG/1
2 CAPSULE ORAL; RESPIRATORY (INHALATION) DAILY
Qty: 0 | Refills: 0 | Status: DISCONTINUED | OUTPATIENT
Start: 2017-01-01 | End: 2017-01-01

## 2017-01-01 RX ORDER — CISATRACURIUM BESYLATE 2 MG/ML
0.5 INJECTION INTRAVENOUS
Qty: 200 | Refills: 0 | Status: DISCONTINUED | OUTPATIENT
Start: 2017-01-01 | End: 2017-01-01

## 2017-01-01 RX ORDER — INSULIN LISPRO 100/ML
VIAL (ML) SUBCUTANEOUS AT BEDTIME
Qty: 0 | Refills: 0 | Status: DISCONTINUED | OUTPATIENT
Start: 2017-01-01 | End: 2017-01-01

## 2017-01-01 RX ORDER — LACTULOSE 10 G/15ML
30 SOLUTION ORAL EVERY 8 HOURS
Qty: 0 | Refills: 0 | Status: DISCONTINUED | OUTPATIENT
Start: 2017-01-01 | End: 2017-01-01

## 2017-01-01 RX ORDER — INSULIN HUMAN 100 [IU]/ML
4 INJECTION, SOLUTION SUBCUTANEOUS ONCE
Qty: 0 | Refills: 0 | Status: COMPLETED | OUTPATIENT
Start: 2017-01-01 | End: 2017-01-01

## 2017-01-01 RX ORDER — SODIUM CHLORIDE 9 MG/ML
3 INJECTION INTRAMUSCULAR; INTRAVENOUS; SUBCUTANEOUS EVERY 6 HOURS
Qty: 0 | Refills: 0 | Status: DISCONTINUED | OUTPATIENT
Start: 2017-01-01 | End: 2017-01-01

## 2017-01-01 RX ORDER — DEXTROSE 50 % IN WATER 50 %
50 SYRINGE (ML) INTRAVENOUS ONCE
Qty: 0 | Refills: 0 | Status: DISCONTINUED | OUTPATIENT
Start: 2017-01-01 | End: 2017-01-01

## 2017-01-01 RX ORDER — PANTOPRAZOLE SODIUM 20 MG/1
40 TABLET, DELAYED RELEASE ORAL
Qty: 0 | Refills: 0 | Status: DISCONTINUED | OUTPATIENT
Start: 2017-01-01 | End: 2017-01-01

## 2017-01-01 RX ORDER — CALCIUM ACETATE 667 MG
667 TABLET ORAL
Qty: 0 | Refills: 0 | Status: DISCONTINUED | OUTPATIENT
Start: 2017-01-01 | End: 2017-01-01

## 2017-01-01 RX ORDER — SENNA PLUS 8.6 MG/1
10 TABLET ORAL DAILY
Qty: 0 | Refills: 0 | Status: DISCONTINUED | OUTPATIENT
Start: 2017-01-01 | End: 2017-01-01

## 2017-01-01 RX ORDER — PROPOFOL 10 MG/ML
50 INJECTION, EMULSION INTRAVENOUS ONCE
Qty: 0 | Refills: 0 | Status: COMPLETED | OUTPATIENT
Start: 2017-01-01 | End: 2017-01-01

## 2017-01-01 RX ORDER — AZITHROMYCIN 500 MG/1
TABLET, FILM COATED ORAL
Qty: 0 | Refills: 0 | Status: DISCONTINUED | OUTPATIENT
Start: 2017-01-01 | End: 2017-01-01

## 2017-01-01 RX ORDER — SENNA PLUS 8.6 MG/1
2 TABLET ORAL
Qty: 0 | Refills: 0 | COMMUNITY

## 2017-01-01 RX ADMIN — Medication 4.7 MICROGRAM(S)/KG/MIN: at 08:01

## 2017-01-01 RX ADMIN — Medication 10 MILLIGRAM(S): at 00:07

## 2017-01-01 RX ADMIN — PROPOFOL 25.08 MICROGRAM(S)/KG/MIN: 10 INJECTION, EMULSION INTRAVENOUS at 14:00

## 2017-01-01 RX ADMIN — ALBUTEROL 2 PUFF(S): 90 AEROSOL, METERED ORAL at 04:02

## 2017-01-01 RX ADMIN — Medication 667 MILLIGRAM(S): at 10:33

## 2017-01-01 RX ADMIN — CEFTRIAXONE 100 GRAM(S): 500 INJECTION, POWDER, FOR SOLUTION INTRAMUSCULAR; INTRAVENOUS at 00:56

## 2017-01-01 RX ADMIN — Medication 4.7 MICROGRAM(S)/KG/MIN: at 22:50

## 2017-01-01 RX ADMIN — ALBUTEROL 2 PUFF(S): 90 AEROSOL, METERED ORAL at 03:58

## 2017-01-01 RX ADMIN — HEPARIN SODIUM 5000 UNIT(S): 5000 INJECTION INTRAVENOUS; SUBCUTANEOUS at 22:10

## 2017-01-01 RX ADMIN — HEPARIN SODIUM 5000 UNIT(S): 5000 INJECTION INTRAVENOUS; SUBCUTANEOUS at 05:16

## 2017-01-01 RX ADMIN — ALBUTEROL 2 PUFF(S): 90 AEROSOL, METERED ORAL at 22:09

## 2017-01-01 RX ADMIN — ATORVASTATIN CALCIUM 40 MILLIGRAM(S): 80 TABLET, FILM COATED ORAL at 22:04

## 2017-01-01 RX ADMIN — PROPOFOL 25.08 MICROGRAM(S)/KG/MIN: 10 INJECTION, EMULSION INTRAVENOUS at 21:17

## 2017-01-01 RX ADMIN — MONTELUKAST 10 MILLIGRAM(S): 4 TABLET, CHEWABLE ORAL at 12:10

## 2017-01-01 RX ADMIN — PANTOPRAZOLE SODIUM 40 MILLIGRAM(S): 20 TABLET, DELAYED RELEASE ORAL at 10:00

## 2017-01-01 RX ADMIN — ALBUTEROL 2 PUFF(S): 90 AEROSOL, METERED ORAL at 09:38

## 2017-01-01 RX ADMIN — ALBUTEROL 2 PUFF(S): 90 AEROSOL, METERED ORAL at 11:41

## 2017-01-01 RX ADMIN — FENTANYL CITRATE 16.72 MICROGRAM(S)/KG/HR: 50 INJECTION INTRAVENOUS at 14:00

## 2017-01-01 RX ADMIN — CHLORHEXIDINE GLUCONATE 1 APPLICATION(S): 213 SOLUTION TOPICAL at 13:24

## 2017-01-01 RX ADMIN — MIDAZOLAM HYDROCHLORIDE 3 MG/HR: 1 INJECTION, SOLUTION INTRAMUSCULAR; INTRAVENOUS at 12:20

## 2017-01-01 RX ADMIN — SODIUM CHLORIDE 3 MILLILITER(S): 9 INJECTION INTRAMUSCULAR; INTRAVENOUS; SUBCUTANEOUS at 22:11

## 2017-01-01 RX ADMIN — PROPOFOL 25.08 MICROGRAM(S)/KG/MIN: 10 INJECTION, EMULSION INTRAVENOUS at 20:30

## 2017-01-01 RX ADMIN — Medication 500 MILLIGRAM(S): at 12:20

## 2017-01-01 RX ADMIN — FENTANYL CITRATE 16.72 MICROGRAM(S)/KG/HR: 50 INJECTION INTRAVENOUS at 19:54

## 2017-01-01 RX ADMIN — PANTOPRAZOLE SODIUM 40 MILLIGRAM(S): 20 TABLET, DELAYED RELEASE ORAL at 05:16

## 2017-01-01 RX ADMIN — LACTULOSE 15 GRAM(S): 10 SOLUTION ORAL at 21:19

## 2017-01-01 RX ADMIN — FENTANYL CITRATE 16.72 MICROGRAM(S)/KG/HR: 50 INJECTION INTRAVENOUS at 08:22

## 2017-01-01 RX ADMIN — FENTANYL CITRATE 2 MICROGRAM(S)/KG/HR: 50 INJECTION INTRAVENOUS at 09:13

## 2017-01-01 RX ADMIN — INSULIN HUMAN 3 UNIT(S)/HR: 100 INJECTION, SOLUTION SUBCUTANEOUS at 17:02

## 2017-01-01 RX ADMIN — CISATRACURIUM BESYLATE 20 MILLIGRAM(S): 2 INJECTION INTRAVENOUS at 09:00

## 2017-01-01 RX ADMIN — INSULIN HUMAN 9 UNIT(S)/HR: 100 INJECTION, SOLUTION SUBCUTANEOUS at 06:59

## 2017-01-01 RX ADMIN — PANTOPRAZOLE SODIUM 40 MILLIGRAM(S): 20 TABLET, DELAYED RELEASE ORAL at 17:30

## 2017-01-01 RX ADMIN — PROPOFOL 25.08 MICROGRAM(S)/KG/MIN: 10 INJECTION, EMULSION INTRAVENOUS at 12:21

## 2017-01-01 RX ADMIN — INSULIN HUMAN 9 UNIT(S)/HR: 100 INJECTION, SOLUTION SUBCUTANEOUS at 08:24

## 2017-01-01 RX ADMIN — PANTOPRAZOLE SODIUM 40 MILLIGRAM(S): 20 TABLET, DELAYED RELEASE ORAL at 05:55

## 2017-01-01 RX ADMIN — CISATRACURIUM BESYLATE 10.03 MICROGRAM(S)/KG/MIN: 2 INJECTION INTRAVENOUS at 19:54

## 2017-01-01 RX ADMIN — Medication 250 MILLIGRAM(S): at 20:11

## 2017-01-01 RX ADMIN — PANTOPRAZOLE SODIUM 40 MILLIGRAM(S): 20 TABLET, DELAYED RELEASE ORAL at 12:19

## 2017-01-01 RX ADMIN — HUMAN INSULIN 2 UNIT(S): 100 INJECTION, SUSPENSION SUBCUTANEOUS at 12:36

## 2017-01-01 RX ADMIN — INSULIN GLARGINE 5 UNIT(S): 100 INJECTION, SOLUTION SUBCUTANEOUS at 00:25

## 2017-01-01 RX ADMIN — Medication: at 12:00

## 2017-01-01 RX ADMIN — ALBUTEROL 2 PUFF(S): 90 AEROSOL, METERED ORAL at 22:30

## 2017-01-01 RX ADMIN — CISATRACURIUM BESYLATE 10.03 MICROGRAM(S)/KG/MIN: 2 INJECTION INTRAVENOUS at 12:20

## 2017-01-01 RX ADMIN — INSULIN HUMAN 5 UNIT(S)/HR: 100 INJECTION, SOLUTION SUBCUTANEOUS at 12:26

## 2017-01-01 RX ADMIN — Medication 667 MILLIGRAM(S): at 12:37

## 2017-01-01 RX ADMIN — Medication 1 PUFF(S): at 04:18

## 2017-01-01 RX ADMIN — Medication 1 PUFF(S): at 03:59

## 2017-01-01 RX ADMIN — Medication 40 MILLIGRAM(S): at 08:48

## 2017-01-01 RX ADMIN — Medication 50 MILLIEQUIVALENT(S): at 06:25

## 2017-01-01 RX ADMIN — HEPARIN SODIUM 5000 UNIT(S): 5000 INJECTION INTRAVENOUS; SUBCUTANEOUS at 05:53

## 2017-01-01 RX ADMIN — Medication 1 PUFF(S): at 17:12

## 2017-01-01 RX ADMIN — ALBUTEROL 2 PUFF(S): 90 AEROSOL, METERED ORAL at 22:00

## 2017-01-01 RX ADMIN — Medication 1 TABLET(S): at 12:20

## 2017-01-01 RX ADMIN — CEFEPIME 100 MILLIGRAM(S): 1 INJECTION, POWDER, FOR SOLUTION INTRAMUSCULAR; INTRAVENOUS at 14:38

## 2017-01-01 RX ADMIN — HEPARIN SODIUM 5000 UNIT(S): 5000 INJECTION INTRAVENOUS; SUBCUTANEOUS at 21:45

## 2017-01-01 RX ADMIN — AMLODIPINE BESYLATE 10 MILLIGRAM(S): 2.5 TABLET ORAL at 02:36

## 2017-01-01 RX ADMIN — Medication 4.7 MICROGRAM(S)/KG/MIN: at 07:01

## 2017-01-01 RX ADMIN — MIDAZOLAM HYDROCHLORIDE 3 MG/HR: 1 INJECTION, SOLUTION INTRAMUSCULAR; INTRAVENOUS at 19:46

## 2017-01-01 RX ADMIN — INSULIN HUMAN 10 UNIT(S): 100 INJECTION, SOLUTION SUBCUTANEOUS at 10:13

## 2017-01-01 RX ADMIN — FENTANYL CITRATE 16.72 MICROGRAM(S)/KG/HR: 50 INJECTION INTRAVENOUS at 19:56

## 2017-01-01 RX ADMIN — Medication 40 MILLIGRAM(S): at 18:49

## 2017-01-01 RX ADMIN — SODIUM CHLORIDE 50 MILLILITER(S): 9 INJECTION, SOLUTION INTRAVENOUS at 12:52

## 2017-01-01 RX ADMIN — FENTANYL CITRATE 2 MICROGRAM(S)/KG/HR: 50 INJECTION INTRAVENOUS at 22:50

## 2017-01-01 RX ADMIN — PROPOFOL 25.08 MICROGRAM(S)/KG/MIN: 10 INJECTION, EMULSION INTRAVENOUS at 23:13

## 2017-01-01 RX ADMIN — SODIUM CHLORIDE 3 MILLILITER(S): 9 INJECTION INTRAMUSCULAR; INTRAVENOUS; SUBCUTANEOUS at 16:09

## 2017-01-01 RX ADMIN — PROPOFOL 25.08 MICROGRAM(S)/KG/MIN: 10 INJECTION, EMULSION INTRAVENOUS at 08:24

## 2017-01-01 RX ADMIN — FENTANYL CITRATE 100 MICROGRAM(S): 50 INJECTION INTRAVENOUS at 03:40

## 2017-01-01 RX ADMIN — HEPARIN SODIUM 5000 UNIT(S): 5000 INJECTION INTRAVENOUS; SUBCUTANEOUS at 14:00

## 2017-01-01 RX ADMIN — Medication 2: at 23:43

## 2017-01-01 RX ADMIN — Medication 667 MILLIGRAM(S): at 08:24

## 2017-01-01 RX ADMIN — Medication 1 PUFF(S): at 17:15

## 2017-01-01 RX ADMIN — CHLORHEXIDINE GLUCONATE 1 APPLICATION(S): 213 SOLUTION TOPICAL at 12:11

## 2017-01-01 RX ADMIN — HUMAN INSULIN 6 UNIT(S): 100 INJECTION, SUSPENSION SUBCUTANEOUS at 19:52

## 2017-01-01 RX ADMIN — INSULIN HUMAN 8 UNIT(S)/HR: 100 INJECTION, SOLUTION SUBCUTANEOUS at 03:00

## 2017-01-01 RX ADMIN — Medication 75 MICROGRAM(S): at 06:51

## 2017-01-01 RX ADMIN — Medication 2: at 12:20

## 2017-01-01 RX ADMIN — Medication 667 MILLIGRAM(S): at 08:22

## 2017-01-01 RX ADMIN — Medication 1 PUFF(S): at 11:41

## 2017-01-01 RX ADMIN — Medication 40 MILLIGRAM(S): at 17:47

## 2017-01-01 RX ADMIN — Medication 4.7 MICROGRAM(S)/KG/MIN: at 06:53

## 2017-01-01 RX ADMIN — ALBUTEROL 2 PUFF(S): 90 AEROSOL, METERED ORAL at 17:12

## 2017-01-01 RX ADMIN — HUMAN INSULIN 9 UNIT(S): 100 INJECTION, SUSPENSION SUBCUTANEOUS at 17:59

## 2017-01-01 RX ADMIN — PANTOPRAZOLE SODIUM 40 MILLIGRAM(S): 20 TABLET, DELAYED RELEASE ORAL at 12:37

## 2017-01-01 RX ADMIN — LACTULOSE 15 GRAM(S): 10 SOLUTION ORAL at 16:21

## 2017-01-01 RX ADMIN — FENTANYL CITRATE 16.72 MICROGRAM(S)/KG/HR: 50 INJECTION INTRAVENOUS at 08:59

## 2017-01-01 RX ADMIN — Medication 1 APPLICATION(S): at 17:10

## 2017-01-01 RX ADMIN — SENNA PLUS 10 MILLILITER(S): 8.6 TABLET ORAL at 11:21

## 2017-01-01 RX ADMIN — Medication 650 MILLIGRAM(S): at 15:49

## 2017-01-01 RX ADMIN — Medication 1 PUFF(S): at 10:09

## 2017-01-01 RX ADMIN — PANTOPRAZOLE SODIUM 40 MILLIGRAM(S): 20 TABLET, DELAYED RELEASE ORAL at 11:21

## 2017-01-01 RX ADMIN — PROPOFOL 25.08 MICROGRAM(S)/KG/MIN: 10 INJECTION, EMULSION INTRAVENOUS at 23:22

## 2017-01-01 RX ADMIN — Medication 3: at 12:39

## 2017-01-01 RX ADMIN — HEPARIN SODIUM 5000 UNIT(S): 5000 INJECTION INTRAVENOUS; SUBCUTANEOUS at 15:06

## 2017-01-01 RX ADMIN — Medication 667 MILLIGRAM(S): at 17:01

## 2017-01-01 RX ADMIN — FENTANYL CITRATE 16.72 MICROGRAM(S)/KG/HR: 50 INJECTION INTRAVENOUS at 18:26

## 2017-01-01 RX ADMIN — SODIUM CHLORIDE 3 MILLILITER(S): 9 INJECTION INTRAMUSCULAR; INTRAVENOUS; SUBCUTANEOUS at 15:40

## 2017-01-01 RX ADMIN — SODIUM CHLORIDE 3 MILLILITER(S): 9 INJECTION INTRAMUSCULAR; INTRAVENOUS; SUBCUTANEOUS at 10:37

## 2017-01-01 RX ADMIN — HEPARIN SODIUM 5000 UNIT(S): 5000 INJECTION INTRAVENOUS; SUBCUTANEOUS at 06:51

## 2017-01-01 RX ADMIN — CHLORHEXIDINE GLUCONATE 1 APPLICATION(S): 213 SOLUTION TOPICAL at 15:51

## 2017-01-01 RX ADMIN — Medication 20 MILLIGRAM(S): at 05:50

## 2017-01-01 RX ADMIN — HEPARIN SODIUM 5000 UNIT(S): 5000 INJECTION INTRAVENOUS; SUBCUTANEOUS at 13:25

## 2017-01-01 RX ADMIN — FENTANYL CITRATE 2 MICROGRAM(S)/KG/HR: 50 INJECTION INTRAVENOUS at 15:15

## 2017-01-01 RX ADMIN — MIDAZOLAM HYDROCHLORIDE 3 MG/HR: 1 INJECTION, SOLUTION INTRAMUSCULAR; INTRAVENOUS at 08:02

## 2017-01-01 RX ADMIN — PROPOFOL 25.08 MICROGRAM(S)/KG/MIN: 10 INJECTION, EMULSION INTRAVENOUS at 19:54

## 2017-01-01 RX ADMIN — CHLORHEXIDINE GLUCONATE 1 APPLICATION(S): 213 SOLUTION TOPICAL at 12:52

## 2017-01-01 RX ADMIN — Medication 0.5 MILLIGRAM(S): at 06:25

## 2017-01-01 RX ADMIN — Medication 4.7 MICROGRAM(S)/KG/MIN: at 08:24

## 2017-01-01 RX ADMIN — CARVEDILOL PHOSPHATE 25 MILLIGRAM(S): 80 CAPSULE, EXTENDED RELEASE ORAL at 02:36

## 2017-01-01 RX ADMIN — LACTULOSE 15 GRAM(S): 10 SOLUTION ORAL at 12:10

## 2017-01-01 RX ADMIN — ALBUTEROL 2 PUFF(S): 90 AEROSOL, METERED ORAL at 03:51

## 2017-01-01 RX ADMIN — HEPARIN SODIUM 5000 UNIT(S): 5000 INJECTION INTRAVENOUS; SUBCUTANEOUS at 22:50

## 2017-01-01 RX ADMIN — Medication 4.7 MICROGRAM(S)/KG/MIN: at 20:11

## 2017-01-01 RX ADMIN — ALBUTEROL 2 PUFF(S): 90 AEROSOL, METERED ORAL at 03:34

## 2017-01-01 RX ADMIN — Medication 10 MILLIGRAM(S): at 17:02

## 2017-01-01 RX ADMIN — FENTANYL CITRATE 2 MICROGRAM(S)/KG/HR: 50 INJECTION INTRAVENOUS at 18:30

## 2017-01-01 RX ADMIN — CEFTRIAXONE 100 GRAM(S): 500 INJECTION, POWDER, FOR SOLUTION INTRAMUSCULAR; INTRAVENOUS at 00:15

## 2017-01-01 RX ADMIN — Medication 3.92 MICROGRAM(S)/KG/MIN: at 12:20

## 2017-01-01 RX ADMIN — Medication 1 PUFF(S): at 22:09

## 2017-01-01 RX ADMIN — PANTOPRAZOLE SODIUM 40 MILLIGRAM(S): 20 TABLET, DELAYED RELEASE ORAL at 12:20

## 2017-01-01 RX ADMIN — HEPARIN SODIUM 5000 UNIT(S): 5000 INJECTION INTRAVENOUS; SUBCUTANEOUS at 05:55

## 2017-01-01 RX ADMIN — Medication 5 MILLIGRAM(S): at 21:44

## 2017-01-01 RX ADMIN — SODIUM CHLORIDE 3 MILLILITER(S): 9 INJECTION INTRAMUSCULAR; INTRAVENOUS; SUBCUTANEOUS at 22:06

## 2017-01-01 RX ADMIN — PROPOFOL 25.08 MICROGRAM(S)/KG/MIN: 10 INJECTION, EMULSION INTRAVENOUS at 05:31

## 2017-01-01 RX ADMIN — Medication 40 MILLIGRAM(S): at 05:55

## 2017-01-01 RX ADMIN — HUMAN INSULIN 9 UNIT(S): 100 INJECTION, SUSPENSION SUBCUTANEOUS at 12:20

## 2017-01-01 RX ADMIN — PROPOFOL 25.08 MICROGRAM(S)/KG/MIN: 10 INJECTION, EMULSION INTRAVENOUS at 11:46

## 2017-01-01 RX ADMIN — Medication 1 PUFF(S): at 10:37

## 2017-01-01 RX ADMIN — Medication 650 MILLIGRAM(S): at 14:04

## 2017-01-01 RX ADMIN — CISATRACURIUM BESYLATE 5.02 MICROGRAM(S)/KG/MIN: 2 INJECTION INTRAVENOUS at 08:01

## 2017-01-01 RX ADMIN — Medication 81 MILLIGRAM(S): at 12:27

## 2017-01-01 RX ADMIN — Medication 1 PUFF(S): at 22:08

## 2017-01-01 RX ADMIN — Medication 1: at 18:23

## 2017-01-01 RX ADMIN — Medication 250 MILLIGRAM(S): at 06:02

## 2017-01-01 RX ADMIN — Medication 40 MILLIGRAM(S): at 05:16

## 2017-01-01 RX ADMIN — CHLORHEXIDINE GLUCONATE 1 APPLICATION(S): 213 SOLUTION TOPICAL at 12:21

## 2017-01-01 RX ADMIN — CISATRACURIUM BESYLATE 5.02 MICROGRAM(S)/KG/MIN: 2 INJECTION INTRAVENOUS at 11:45

## 2017-01-01 RX ADMIN — Medication 650 MILLIGRAM(S): at 11:00

## 2017-01-01 RX ADMIN — Medication 200 MILLIGRAM(S): at 15:14

## 2017-01-01 RX ADMIN — CISATRACURIUM BESYLATE 10.03 MICROGRAM(S)/KG/MIN: 2 INJECTION INTRAVENOUS at 21:17

## 2017-01-01 RX ADMIN — SODIUM CHLORIDE 3 MILLILITER(S): 9 INJECTION INTRAMUSCULAR; INTRAVENOUS; SUBCUTANEOUS at 10:40

## 2017-01-01 RX ADMIN — Medication 400 MILLIGRAM(S): at 22:56

## 2017-01-01 RX ADMIN — HUMAN INSULIN 6 UNIT(S): 100 INJECTION, SUSPENSION SUBCUTANEOUS at 05:51

## 2017-01-01 RX ADMIN — FENTANYL CITRATE 16.72 MICROGRAM(S)/KG/HR: 50 INJECTION INTRAVENOUS at 08:24

## 2017-01-01 RX ADMIN — Medication 4.7 MICROGRAM(S)/KG/MIN: at 19:54

## 2017-01-01 RX ADMIN — Medication 1 TABLET(S): at 12:37

## 2017-01-01 RX ADMIN — Medication 1 PUFF(S): at 15:31

## 2017-01-01 RX ADMIN — Medication 4.7 MICROGRAM(S)/KG/MIN: at 11:46

## 2017-01-01 RX ADMIN — Medication 1 PUFF(S): at 10:51

## 2017-01-01 RX ADMIN — Medication 40 MILLIGRAM(S): at 06:51

## 2017-01-01 RX ADMIN — MEROPENEM 200 MILLIGRAM(S): 1 INJECTION INTRAVENOUS at 07:50

## 2017-01-01 RX ADMIN — SODIUM CHLORIDE 3 MILLILITER(S): 9 INJECTION INTRAMUSCULAR; INTRAVENOUS; SUBCUTANEOUS at 22:00

## 2017-01-01 RX ADMIN — ALBUTEROL 2 PUFF(S): 90 AEROSOL, METERED ORAL at 04:18

## 2017-01-01 RX ADMIN — FENTANYL CITRATE 16.72 MICROGRAM(S)/KG/HR: 50 INJECTION INTRAVENOUS at 06:54

## 2017-01-01 RX ADMIN — Medication 1 PUFF(S): at 10:41

## 2017-01-01 RX ADMIN — MIDAZOLAM HYDROCHLORIDE 2 MILLIGRAM(S): 1 INJECTION, SOLUTION INTRAMUSCULAR; INTRAVENOUS at 07:21

## 2017-01-01 RX ADMIN — Medication 667 MILLIGRAM(S): at 12:27

## 2017-01-01 RX ADMIN — FENTANYL CITRATE 16.72 MICROGRAM(S)/KG/HR: 50 INJECTION INTRAVENOUS at 20:00

## 2017-01-01 RX ADMIN — Medication 50 MILLIEQUIVALENT(S): at 05:53

## 2017-01-01 RX ADMIN — Medication 1 PUFF(S): at 03:45

## 2017-01-01 RX ADMIN — Medication 667 MILLIGRAM(S): at 12:20

## 2017-01-01 RX ADMIN — Medication 37.5 MICROGRAM(S): at 05:50

## 2017-01-01 RX ADMIN — Medication 3.92 MICROGRAM(S)/KG/MIN: at 20:30

## 2017-01-01 RX ADMIN — PROPOFOL 25.08 MICROGRAM(S)/KG/MIN: 10 INJECTION, EMULSION INTRAVENOUS at 07:01

## 2017-01-01 RX ADMIN — ATORVASTATIN CALCIUM 40 MILLIGRAM(S): 80 TABLET, FILM COATED ORAL at 22:35

## 2017-01-01 RX ADMIN — ALBUTEROL 2 PUFF(S): 90 AEROSOL, METERED ORAL at 15:32

## 2017-01-01 RX ADMIN — FENTANYL CITRATE 16.72 MICROGRAM(S)/KG/HR: 50 INJECTION INTRAVENOUS at 22:50

## 2017-01-01 RX ADMIN — Medication 40 MILLIGRAM(S): at 18:26

## 2017-01-01 RX ADMIN — HEPARIN SODIUM 5000 UNIT(S): 5000 INJECTION INTRAVENOUS; SUBCUTANEOUS at 22:35

## 2017-01-01 RX ADMIN — HUMAN INSULIN 9 UNIT(S): 100 INJECTION, SUSPENSION SUBCUTANEOUS at 00:28

## 2017-01-01 RX ADMIN — HYDROMORPHONE HYDROCHLORIDE 1 MILLIGRAM(S): 2 INJECTION INTRAMUSCULAR; INTRAVENOUS; SUBCUTANEOUS at 18:47

## 2017-01-01 RX ADMIN — SODIUM CHLORIDE 3 MILLILITER(S): 9 INJECTION INTRAMUSCULAR; INTRAVENOUS; SUBCUTANEOUS at 22:21

## 2017-01-01 RX ADMIN — Medication 81 MILLIGRAM(S): at 11:21

## 2017-01-01 RX ADMIN — CISATRACURIUM BESYLATE 10.03 MICROGRAM(S)/KG/MIN: 2 INJECTION INTRAVENOUS at 19:58

## 2017-01-01 RX ADMIN — AZITHROMYCIN 250 MILLIGRAM(S): 500 TABLET, FILM COATED ORAL at 20:29

## 2017-01-01 RX ADMIN — Medication 40 MILLIGRAM(S): at 05:51

## 2017-01-01 RX ADMIN — Medication 500 MILLIGRAM(S): at 12:27

## 2017-01-01 RX ADMIN — Medication 40 MILLIGRAM(S): at 17:31

## 2017-01-01 RX ADMIN — PROPOFOL 50 MILLIGRAM(S): 10 INJECTION, EMULSION INTRAVENOUS at 08:30

## 2017-01-01 RX ADMIN — SODIUM CHLORIDE 3 MILLILITER(S): 9 INJECTION INTRAMUSCULAR; INTRAVENOUS; SUBCUTANEOUS at 07:49

## 2017-01-01 RX ADMIN — HEPARIN SODIUM 5000 UNIT(S): 5000 INJECTION INTRAVENOUS; SUBCUTANEOUS at 13:41

## 2017-01-01 RX ADMIN — HEPARIN SODIUM 5000 UNIT(S): 5000 INJECTION INTRAVENOUS; SUBCUTANEOUS at 06:52

## 2017-01-01 RX ADMIN — FENTANYL CITRATE 16.72 MICROGRAM(S)/KG/HR: 50 INJECTION INTRAVENOUS at 10:13

## 2017-01-01 RX ADMIN — Medication 1 PUFF(S): at 11:04

## 2017-01-01 RX ADMIN — Medication 1 INCH(S): at 15:30

## 2017-01-01 RX ADMIN — ATORVASTATIN CALCIUM 40 MILLIGRAM(S): 80 TABLET, FILM COATED ORAL at 22:50

## 2017-01-01 RX ADMIN — Medication 2: at 05:51

## 2017-01-01 RX ADMIN — AZITHROMYCIN 250 MILLIGRAM(S): 500 TABLET, FILM COATED ORAL at 18:58

## 2017-01-01 RX ADMIN — FENTANYL CITRATE 16.72 MICROGRAM(S)/KG/HR: 50 INJECTION INTRAVENOUS at 15:00

## 2017-01-01 RX ADMIN — PROPOFOL 25.08 MICROGRAM(S)/KG/MIN: 10 INJECTION, EMULSION INTRAVENOUS at 19:46

## 2017-01-01 RX ADMIN — PANTOPRAZOLE SODIUM 40 MILLIGRAM(S): 20 TABLET, DELAYED RELEASE ORAL at 12:27

## 2017-01-01 RX ADMIN — Medication 4.7 MICROGRAM(S)/KG/MIN: at 05:32

## 2017-01-01 RX ADMIN — Medication: at 18:10

## 2017-01-01 RX ADMIN — Medication 400 MILLIGRAM(S): at 04:39

## 2017-01-01 RX ADMIN — CISATRACURIUM BESYLATE 10.03 MICROGRAM(S)/KG/MIN: 2 INJECTION INTRAVENOUS at 10:13

## 2017-01-01 RX ADMIN — PROPOFOL 25.08 MICROGRAM(S)/KG/MIN: 10 INJECTION, EMULSION INTRAVENOUS at 08:22

## 2017-01-01 RX ADMIN — CISATRACURIUM BESYLATE 10.03 MICROGRAM(S)/KG/MIN: 2 INJECTION INTRAVENOUS at 06:54

## 2017-01-01 RX ADMIN — Medication 1 PUFF(S): at 22:13

## 2017-01-01 RX ADMIN — PANTOPRAZOLE SODIUM 40 MILLIGRAM(S): 20 TABLET, DELAYED RELEASE ORAL at 15:50

## 2017-01-01 RX ADMIN — FENTANYL CITRATE 16.72 MICROGRAM(S)/KG/HR: 50 INJECTION INTRAVENOUS at 20:11

## 2017-01-01 RX ADMIN — LACTULOSE 30 GRAM(S): 10 SOLUTION ORAL at 13:57

## 2017-01-01 RX ADMIN — Medication 4.7 MICROGRAM(S)/KG/MIN: at 08:22

## 2017-01-01 RX ADMIN — Medication 5 MILLIGRAM(S): at 18:24

## 2017-01-01 RX ADMIN — Medication 5 MILLIGRAM(S): at 10:00

## 2017-01-01 RX ADMIN — SODIUM CHLORIDE 50 MILLILITER(S): 9 INJECTION, SOLUTION INTRAVENOUS at 20:31

## 2017-01-01 RX ADMIN — MONTELUKAST 10 MILLIGRAM(S): 4 TABLET, CHEWABLE ORAL at 17:47

## 2017-01-01 RX ADMIN — SENNA PLUS 10 MILLILITER(S): 8.6 TABLET ORAL at 12:10

## 2017-01-01 RX ADMIN — HEPARIN SODIUM 5000 UNIT(S): 5000 INJECTION INTRAVENOUS; SUBCUTANEOUS at 13:44

## 2017-01-01 RX ADMIN — AZITHROMYCIN 250 MILLIGRAM(S): 500 TABLET, FILM COATED ORAL at 18:49

## 2017-01-01 RX ADMIN — SODIUM CHLORIDE 3 MILLILITER(S): 9 INJECTION INTRAMUSCULAR; INTRAVENOUS; SUBCUTANEOUS at 07:58

## 2017-01-01 RX ADMIN — Medication 1: at 05:52

## 2017-01-01 RX ADMIN — CISATRACURIUM BESYLATE 10.03 MICROGRAM(S)/KG/MIN: 2 INJECTION INTRAVENOUS at 05:32

## 2017-01-01 RX ADMIN — HUMAN INSULIN 6 UNIT(S): 100 INJECTION, SUSPENSION SUBCUTANEOUS at 23:42

## 2017-01-01 RX ADMIN — HEPARIN SODIUM 5000 UNIT(S): 5000 INJECTION INTRAVENOUS; SUBCUTANEOUS at 13:55

## 2017-01-01 RX ADMIN — MONTELUKAST 10 MILLIGRAM(S): 4 TABLET, CHEWABLE ORAL at 11:21

## 2017-01-01 RX ADMIN — CHLORHEXIDINE GLUCONATE 1 APPLICATION(S): 213 SOLUTION TOPICAL at 12:35

## 2017-01-01 RX ADMIN — CHLORHEXIDINE GLUCONATE 1 APPLICATION(S): 213 SOLUTION TOPICAL at 17:26

## 2017-01-01 RX ADMIN — Medication 1: at 05:56

## 2017-01-01 RX ADMIN — PROPOFOL 25.08 MICROGRAM(S)/KG/MIN: 10 INJECTION, EMULSION INTRAVENOUS at 22:50

## 2017-01-01 RX ADMIN — Medication 1 PUFF(S): at 04:02

## 2017-01-01 RX ADMIN — SODIUM CHLORIDE 3 MILLILITER(S): 9 INJECTION INTRAMUSCULAR; INTRAVENOUS; SUBCUTANEOUS at 22:23

## 2017-01-01 RX ADMIN — ALBUTEROL 2 PUFF(S): 90 AEROSOL, METERED ORAL at 10:37

## 2017-01-01 RX ADMIN — Medication 500 MILLIGRAM(S): at 12:37

## 2017-01-01 RX ADMIN — Medication 1 MILLIGRAM(S): at 16:30

## 2017-01-01 RX ADMIN — HEPARIN SODIUM 5000 UNIT(S): 5000 INJECTION INTRAVENOUS; SUBCUTANEOUS at 21:19

## 2017-01-01 RX ADMIN — MEROPENEM 200 MILLIGRAM(S): 1 INJECTION INTRAVENOUS at 05:49

## 2017-01-01 RX ADMIN — HEPARIN SODIUM 5000 UNIT(S): 5000 INJECTION INTRAVENOUS; SUBCUTANEOUS at 05:51

## 2017-01-01 RX ADMIN — SODIUM CHLORIDE 3 MILLILITER(S): 9 INJECTION INTRAMUSCULAR; INTRAVENOUS; SUBCUTANEOUS at 16:14

## 2017-01-01 RX ADMIN — Medication 4.7 MICROGRAM(S)/KG/MIN: at 21:17

## 2017-01-01 RX ADMIN — ALBUTEROL 2 PUFF(S): 90 AEROSOL, METERED ORAL at 16:15

## 2017-01-01 RX ADMIN — Medication 1334 MILLIGRAM(S): at 16:42

## 2017-01-01 RX ADMIN — ALBUTEROL 2 PUFF(S): 90 AEROSOL, METERED ORAL at 22:12

## 2017-01-01 RX ADMIN — Medication 1: at 00:56

## 2017-01-01 RX ADMIN — Medication 4: at 06:53

## 2017-01-01 RX ADMIN — FENTANYL CITRATE 16.72 MICROGRAM(S)/KG/HR: 50 INJECTION INTRAVENOUS at 07:01

## 2017-01-01 RX ADMIN — SENNA PLUS 2 TABLET(S): 8.6 TABLET ORAL at 22:04

## 2017-01-01 RX ADMIN — Medication 3 MILLILITER(S): at 04:11

## 2017-01-01 RX ADMIN — MIDAZOLAM HYDROCHLORIDE 3 MG/HR: 1 INJECTION, SOLUTION INTRAMUSCULAR; INTRAVENOUS at 11:45

## 2017-01-01 RX ADMIN — FENTANYL CITRATE 2 MICROGRAM(S)/KG/HR: 50 INJECTION INTRAVENOUS at 07:16

## 2017-01-01 RX ADMIN — FENTANYL CITRATE 2 MICROGRAM(S)/KG/HR: 50 INJECTION INTRAVENOUS at 08:45

## 2017-01-01 RX ADMIN — SENNA PLUS 10 MILLILITER(S): 8.6 TABLET ORAL at 22:09

## 2017-01-01 RX ADMIN — PROPOFOL 25.08 MICROGRAM(S)/KG/MIN: 10 INJECTION, EMULSION INTRAVENOUS at 08:01

## 2017-01-01 RX ADMIN — Medication 2: at 00:28

## 2017-01-01 RX ADMIN — Medication 81 MILLIGRAM(S): at 12:37

## 2017-01-01 RX ADMIN — Medication 1 INCH(S): at 04:30

## 2017-01-01 RX ADMIN — Medication 2: at 23:13

## 2017-01-01 RX ADMIN — Medication 37.5 MICROGRAM(S): at 12:20

## 2017-01-01 RX ADMIN — PROPOFOL 25.08 MICROGRAM(S)/KG/MIN: 10 INJECTION, EMULSION INTRAVENOUS at 10:13

## 2017-01-01 RX ADMIN — INSULIN HUMAN 4 UNIT(S): 100 INJECTION, SOLUTION SUBCUTANEOUS at 01:01

## 2017-01-01 RX ADMIN — FENTANYL CITRATE 2 MICROGRAM(S)/KG/HR: 50 INJECTION INTRAVENOUS at 09:18

## 2017-01-01 RX ADMIN — Medication 40 MILLIGRAM(S): at 18:01

## 2017-01-01 RX ADMIN — FENTANYL CITRATE 16.72 MICROGRAM(S)/KG/HR: 50 INJECTION INTRAVENOUS at 00:15

## 2017-01-01 RX ADMIN — HEPARIN SODIUM 5000 UNIT(S): 5000 INJECTION INTRAVENOUS; SUBCUTANEOUS at 05:58

## 2017-01-01 RX ADMIN — PROPOFOL 25.08 MICROGRAM(S)/KG/MIN: 10 INJECTION, EMULSION INTRAVENOUS at 06:53

## 2017-01-01 RX ADMIN — CISATRACURIUM BESYLATE 5.02 MICROGRAM(S)/KG/MIN: 2 INJECTION INTRAVENOUS at 12:20

## 2017-01-01 RX ADMIN — INSULIN HUMAN 8 UNIT(S)/HR: 100 INJECTION, SOLUTION SUBCUTANEOUS at 05:31

## 2017-01-01 RX ADMIN — Medication 1 PUFF(S): at 22:00

## 2017-01-01 RX ADMIN — Medication 4.7 MICROGRAM(S)/KG/MIN: at 19:57

## 2017-01-01 RX ADMIN — Medication 1 PUFF(S): at 22:30

## 2017-01-01 RX ADMIN — Medication 5 MILLILITER(S): at 11:21

## 2017-01-01 RX ADMIN — MIDAZOLAM HYDROCHLORIDE 3 MG/HR: 1 INJECTION, SOLUTION INTRAMUSCULAR; INTRAVENOUS at 23:50

## 2017-01-01 RX ADMIN — Medication 1 PUFF(S): at 22:11

## 2017-01-01 RX ADMIN — SODIUM CHLORIDE 3 MILLILITER(S): 9 INJECTION INTRAMUSCULAR; INTRAVENOUS; SUBCUTANEOUS at 11:05

## 2017-01-01 RX ADMIN — INSULIN GLARGINE 5 UNIT(S): 100 INJECTION, SOLUTION SUBCUTANEOUS at 00:14

## 2017-01-01 RX ADMIN — ALBUTEROL 2 PUFF(S): 90 AEROSOL, METERED ORAL at 10:41

## 2017-01-01 RX ADMIN — CISATRACURIUM BESYLATE 5.02 MICROGRAM(S)/KG/MIN: 2 INJECTION INTRAVENOUS at 01:52

## 2017-01-01 RX ADMIN — Medication 4.7 MICROGRAM(S)/KG/MIN: at 20:06

## 2017-01-01 RX ADMIN — Medication 4.7 MICROGRAM(S)/KG/MIN: at 10:13

## 2017-01-01 RX ADMIN — ALBUTEROL 2 PUFF(S): 90 AEROSOL, METERED ORAL at 11:05

## 2017-01-01 RX ADMIN — Medication 1 PUFF(S): at 03:19

## 2017-01-01 RX ADMIN — Medication 5: at 00:25

## 2017-01-01 RX ADMIN — Medication 250 MILLIGRAM(S): at 13:47

## 2017-01-01 RX ADMIN — Medication 1: at 17:31

## 2017-01-01 RX ADMIN — FENTANYL CITRATE 16.72 MICROGRAM(S)/KG/HR: 50 INJECTION INTRAVENOUS at 05:31

## 2017-01-01 RX ADMIN — HUMAN INSULIN 2 UNIT(S): 100 INJECTION, SUSPENSION SUBCUTANEOUS at 18:01

## 2017-01-01 RX ADMIN — Medication 37.5 MICROGRAM(S): at 05:56

## 2017-01-01 RX ADMIN — Medication 500 MILLIGRAM(S): at 12:10

## 2017-01-01 RX ADMIN — Medication 4: at 12:30

## 2017-01-01 RX ADMIN — MIDAZOLAM HYDROCHLORIDE 3 MG/HR: 1 INJECTION, SOLUTION INTRAMUSCULAR; INTRAVENOUS at 20:52

## 2017-01-01 RX ADMIN — Medication 5 MILLIGRAM(S): at 11:34

## 2017-01-01 RX ADMIN — HEPARIN SODIUM 5000 UNIT(S): 5000 INJECTION INTRAVENOUS; SUBCUTANEOUS at 22:05

## 2017-01-01 RX ADMIN — ALBUTEROL 2 PUFF(S): 90 AEROSOL, METERED ORAL at 10:09

## 2017-01-01 RX ADMIN — HUMAN INSULIN 6 UNIT(S): 100 INJECTION, SUSPENSION SUBCUTANEOUS at 23:12

## 2017-01-01 RX ADMIN — CISATRACURIUM BESYLATE 5.02 MICROGRAM(S)/KG/MIN: 2 INJECTION INTRAVENOUS at 20:30

## 2017-01-01 RX ADMIN — CISATRACURIUM BESYLATE 5.02 MICROGRAM(S)/KG/MIN: 2 INJECTION INTRAVENOUS at 19:46

## 2017-01-01 RX ADMIN — Medication 3 MILLILITER(S): at 23:25

## 2017-01-01 RX ADMIN — HEPARIN SODIUM 5000 UNIT(S): 5000 INJECTION INTRAVENOUS; SUBCUTANEOUS at 21:22

## 2017-01-01 RX ADMIN — HEPARIN SODIUM 5000 UNIT(S): 5000 INJECTION INTRAVENOUS; SUBCUTANEOUS at 13:24

## 2017-01-01 RX ADMIN — ALBUTEROL 2 PUFF(S): 90 AEROSOL, METERED ORAL at 03:18

## 2017-01-01 RX ADMIN — Medication 1 PUFF(S): at 16:11

## 2017-01-01 RX ADMIN — Medication 667 MILLIGRAM(S): at 17:47

## 2017-01-01 RX ADMIN — CISATRACURIUM BESYLATE 10.03 MICROGRAM(S)/KG/MIN: 2 INJECTION INTRAVENOUS at 08:24

## 2017-01-01 RX ADMIN — ALBUTEROL 2 PUFF(S): 90 AEROSOL, METERED ORAL at 17:15

## 2017-01-01 RX ADMIN — Medication 1 TABLET(S): at 12:27

## 2017-01-01 RX ADMIN — Medication 40 MILLIGRAM(S): at 05:58

## 2017-01-01 RX ADMIN — PROPOFOL 40 MILLIGRAM(S): 10 INJECTION, EMULSION INTRAVENOUS at 07:21

## 2017-01-01 RX ADMIN — ALBUTEROL 2 PUFF(S): 90 AEROSOL, METERED ORAL at 22:11

## 2017-01-01 RX ADMIN — PROPOFOL 25.08 MICROGRAM(S)/KG/MIN: 10 INJECTION, EMULSION INTRAVENOUS at 19:57

## 2017-01-01 RX ADMIN — Medication 1 APPLICATION(S): at 06:00

## 2017-01-01 RX ADMIN — Medication 1: at 19:00

## 2017-01-01 RX ADMIN — Medication 1 PUFF(S): at 09:37

## 2017-01-01 RX ADMIN — Medication 3.92 MICROGRAM(S)/KG/MIN: at 11:26

## 2017-01-01 RX ADMIN — ALBUTEROL 2 PUFF(S): 90 AEROSOL, METERED ORAL at 03:45

## 2017-01-01 RX ADMIN — Medication 1 PUFF(S): at 15:32

## 2017-01-01 RX ADMIN — Medication 3: at 12:20

## 2017-01-01 RX ADMIN — HUMAN INSULIN 9 UNIT(S): 100 INJECTION, SUSPENSION SUBCUTANEOUS at 05:51

## 2017-01-01 RX ADMIN — Medication 5 MILLILITER(S): at 12:10

## 2017-01-01 RX ADMIN — CISATRACURIUM BESYLATE 10.03 MICROGRAM(S)/KG/MIN: 2 INJECTION INTRAVENOUS at 20:12

## 2017-01-01 RX ADMIN — Medication 200 GRAM(S): at 09:00

## 2017-01-01 RX ADMIN — HUMAN INSULIN 9 UNIT(S): 100 INJECTION, SUSPENSION SUBCUTANEOUS at 12:21

## 2017-01-01 RX ADMIN — SODIUM CHLORIDE 3 MILLILITER(S): 9 INJECTION INTRAMUSCULAR; INTRAVENOUS; SUBCUTANEOUS at 15:41

## 2017-01-01 RX ADMIN — MONTELUKAST 10 MILLIGRAM(S): 4 TABLET, CHEWABLE ORAL at 12:37

## 2017-01-01 RX ADMIN — ALBUTEROL 2 PUFF(S): 90 AEROSOL, METERED ORAL at 10:51

## 2017-01-01 RX ADMIN — Medication 1: at 05:58

## 2017-01-01 RX ADMIN — HYDROMORPHONE HYDROCHLORIDE 1 MILLIGRAM(S): 2 INJECTION INTRAMUSCULAR; INTRAVENOUS; SUBCUTANEOUS at 18:41

## 2017-01-01 RX ADMIN — HEPARIN SODIUM 5000 UNIT(S): 5000 INJECTION INTRAVENOUS; SUBCUTANEOUS at 13:19

## 2017-01-01 RX ADMIN — Medication 1: at 12:13

## 2017-01-01 RX ADMIN — Medication 1: at 17:59

## 2017-01-01 RX ADMIN — ATORVASTATIN CALCIUM 40 MILLIGRAM(S): 80 TABLET, FILM COATED ORAL at 22:09

## 2017-01-01 RX ADMIN — PROPOFOL 25.08 MICROGRAM(S)/KG/MIN: 10 INJECTION, EMULSION INTRAVENOUS at 20:11

## 2017-01-01 RX ADMIN — HUMAN INSULIN 9 UNIT(S): 100 INJECTION, SUSPENSION SUBCUTANEOUS at 12:12

## 2017-01-01 RX ADMIN — ATORVASTATIN CALCIUM 40 MILLIGRAM(S): 80 TABLET, FILM COATED ORAL at 21:19

## 2017-01-01 RX ADMIN — MIDAZOLAM HYDROCHLORIDE 3 MG/HR: 1 INJECTION, SOLUTION INTRAMUSCULAR; INTRAVENOUS at 20:30

## 2017-01-01 RX ADMIN — INSULIN HUMAN 6 UNIT(S)/HR: 100 INJECTION, SOLUTION SUBCUTANEOUS at 02:07

## 2017-01-01 RX ADMIN — CISATRACURIUM BESYLATE 20 MILLIGRAM(S): 2 INJECTION INTRAVENOUS at 13:52

## 2017-01-01 RX ADMIN — AZITHROMYCIN 250 MILLIGRAM(S): 500 TABLET, FILM COATED ORAL at 19:08

## 2017-01-01 RX ADMIN — Medication 1 PUFF(S): at 16:15

## 2017-01-01 RX ADMIN — PANTOPRAZOLE SODIUM 40 MILLIGRAM(S): 20 TABLET, DELAYED RELEASE ORAL at 18:26

## 2017-01-01 RX ADMIN — DEXMEDETOMIDINE HYDROCHLORIDE IN 0.9% SODIUM CHLORIDE 4.18 MICROGRAM(S)/KG/HR: 4 INJECTION INTRAVENOUS at 06:33

## 2017-01-01 RX ADMIN — AZITHROMYCIN 250 MILLIGRAM(S): 500 TABLET, FILM COATED ORAL at 18:13

## 2017-01-01 RX ADMIN — Medication 1 PUFF(S): at 03:51

## 2017-01-01 RX ADMIN — ALBUTEROL 2 PUFF(S): 90 AEROSOL, METERED ORAL at 15:43

## 2017-01-01 RX ADMIN — INSULIN HUMAN 4 UNIT(S)/HR: 100 INJECTION, SOLUTION SUBCUTANEOUS at 01:01

## 2017-01-01 RX ADMIN — MONTELUKAST 10 MILLIGRAM(S): 4 TABLET, CHEWABLE ORAL at 12:20

## 2017-01-01 RX ADMIN — ALBUTEROL 2 PUFF(S): 90 AEROSOL, METERED ORAL at 22:08

## 2017-01-01 RX ADMIN — ALBUTEROL 2 PUFF(S): 90 AEROSOL, METERED ORAL at 16:11

## 2017-01-01 RX ADMIN — MIDAZOLAM HYDROCHLORIDE 2 MILLIGRAM(S): 1 INJECTION, SOLUTION INTRAMUSCULAR; INTRAVENOUS at 07:20

## 2017-01-01 RX ADMIN — Medication 500 MILLIGRAM(S): at 11:21

## 2017-01-01 RX ADMIN — FENTANYL CITRATE 2 MICROGRAM(S)/KG/HR: 50 INJECTION INTRAVENOUS at 10:14

## 2017-01-01 RX ADMIN — Medication 1 PUFF(S): at 15:43

## 2017-01-01 RX ADMIN — ALBUTEROL 2 PUFF(S): 90 AEROSOL, METERED ORAL at 15:31

## 2017-01-01 RX ADMIN — SEVELAMER CARBONATE 1600 MILLIGRAM(S): 2400 POWDER, FOR SUSPENSION ORAL at 15:48

## 2017-01-01 RX ADMIN — ALBUTEROL 2 PUFF(S): 90 AEROSOL, METERED ORAL at 21:56

## 2017-01-01 RX ADMIN — SODIUM CHLORIDE 3 MILLILITER(S): 9 INJECTION INTRAMUSCULAR; INTRAVENOUS; SUBCUTANEOUS at 15:43

## 2017-01-01 RX ADMIN — Medication 81 MILLIGRAM(S): at 12:20

## 2017-01-01 RX ADMIN — Medication 75 MICROGRAM(S): at 05:58

## 2017-01-01 RX ADMIN — Medication 1 PUFF(S): at 03:34

## 2017-01-01 RX ADMIN — Medication 1 PUFF(S): at 21:56

## 2017-01-01 RX ADMIN — Medication 40 MILLIGRAM(S): at 22:04

## 2017-01-01 RX ADMIN — Medication 5: at 18:01

## 2017-05-25 NOTE — CHART NOTE - NSCHARTNOTEFT_GEN_A_CORE
Patient seen and examined at the bedside, s/p recent BiPAP settings adjustment and repeat ABG. Patient breathing comfortably with BiPAP in place, no complaints at this time, awake and alert. Lung sounds diminished throughout, however no wheezes, rales or rhonchi appreciated. RVP positive for parainfluenza 3. Vital Signs: T(F): 98.5, Max: 101.4 (05-25 @ 13:23), HR: 99 (72 - 106), BP: 156/79 (144/50 - 168/84), RR: 20 (20 - 33), SpO2: 100% (93% - 100%) on BiPAP at 16/5 and 40% FiO2. ABG improved from prior (which was off of BiPAP).    VBG - ( 25 May 2017 19:40 )  pH: 7.14  /  pCO2: 90    /  pO2: 39       ABG - ( 25 May 2017 22:50 )  pH: 7.19  /  pCO2: 73    /  pO2: 110   / HCO3: 22    / Base Excess: -0.3  /  SaO2: 97.6    Will continue with BiPAP at current settings as pCO2 improving and patient stable, mentating, awake and alert. Will continue to closely monitor. Patient seen and examined at the bedside, s/p recent BiPAP settings adjustment and repeat ABG. Patient breathing comfortably with BiPAP in place, no complaints at this time, awake and alert. Lung sounds diminished throughout, however no wheezes, rales or rhonchi appreciated. RVP positive for parainfluenza 3. Vital Signs: T(F): 98.5, Max: 101.4 (05-25 @ 13:23), HR: 99 (72 - 106), BP: 156/79 (144/50 - 168/84), RR: 20 (20 - 33), SpO2: 100% (93% - 100%) on BiPAP at 16/5 and 40% FiO2. ABG improved from prior (which was off of BiPAP).    VBG - ( 25 May 2017 19:40 )  pH: 7.14  /  pCO2: 90    /  pO2: 39       ABG - ( 25 May 2017 22:50 )  pH: 7.19  /  pCO2: 73    /  pO2: 110   / HCO3: 22    / Base Excess: -0.3  /  SaO2: 97.6    Will continue with BiPAP at current settings as pCO2 improving and patient stable, mentating, awake and alert. Will repeat ABG in 1 hour from prior to assess for continued improvement. Will continue to closely monitor.

## 2017-05-25 NOTE — H&P ADULT - PROBLEM SELECTOR PLAN 8
- Pt appears to not be using insulin correctly. Will f/u A1C.  - Will do Lantus 5U QHS (1/2 of pt's presumed home dose)  - SSI, FS QAC/HS

## 2017-05-25 NOTE — H&P ADULT - PROBLEM SELECTOR PLAN 1
- With hypercarbia, likely in setting of COPD exacerbation. However, pt does not appear to have AMS. Should be on BiPAP QHS. Will f/u VBG in AM.  - C/w Spiriva, Symbicort, Singulair.  - Ethan standing Q6 for now. Albuterol PRN  - Azithro/Ceftriaxone  - Solumedrol IV TID  - Pt's pulmonologist, Dr. Isauro Arizmendi notified. Pt will be admitted to RCU. - With hypercarbia, likely in setting of COPD exacerbation. However, pt does not appear to have AMS. Should be on BiPAP QHS. Will f/u VBG in AM.  - C/w Singulair.  - Ethan standing Q6 for now. Albuterol PRN  - Azithro/Ceftriaxone  - Solumedrol IV TID  - Pt's pulmonologist, Dr. Isauro Arizmendi notified. Pt will be admitted to RCU.

## 2017-05-25 NOTE — H&P ADULT - PROBLEM SELECTOR PLAN 4
- P does meet SIRS criteria. However, pt is also hypertensive, and in setting of HF, would be more beneficial to continue with anti-hypertensives with parameters.  - Therefore, c/w Isosorbide and Amlodipine

## 2017-05-25 NOTE — H&P ADULT - PMH
Asthma, chronic    CAD (coronary artery disease)    CHF (congestive heart failure)    CKD (chronic kidney disease) stage 3, GFR 30-59 ml/min    COPD (chronic obstructive pulmonary disease)    DM (diabetes mellitus)    Facial paralysis/[place] palsy    HLD (hyperlipidemia)    HTN (hypertension)    Hypothyroid

## 2017-05-25 NOTE — CONSULT NOTE ADULT - SUBJECTIVE AND OBJECTIVE BOX
MER Parnell    Patient is a 82y old  Male who presents with a chief complaint of SOB/wheezing    HPI: 83 y/o male PMH of ESRD on HD (M,W,F), HTN, DM, CAD, COPD presents to [facility] today c/o SOB that has been progressively worsening over last few days.  Pt states he has been in a normal state of health but as of late he noticed he has decreased exercise tolerance and has been getting more SOB.  Pt denies any recent travel, or recent sick contacts.  Pt vitals upon entry to the ER include a BP of 153/68, HR of 101, and afebrile.  Pt upon admission had a CXR performed which was clear.  Pt currently receiving BiPAP therapy for an elevated CO2 noticed on VBG.  VBG had pH of 7.21/80/47/25. Of note pt has been febrile in ER w/ tmax of 101.4 degrees faranheit.  Thus far in the ED pt has received empiric IV abx therapy and PO tylenol.     Pt w/ a hx of ESRD on HD, w/ last full completed treatment done yesterday over at [facility].  Pt has been on hemodialysis for approximately 2 years, and has a Left AVF as access.  Pt w/ a hx of DM for many years and does suffer from retinopathy and is one of the reasons he subsequently ended up on hemodialysis 2/2 nephrotic syndrome from the diabetes.        Pt cardiac hx includes having av preserved LV function and cardiac catheterization done previously had clean coronaries.  He has a significant lung history.  He is an ex-smoker, uses nebulizers at home and has had numerous hospitalizations for COPD flare.  He has never required mechanical intubation.    PAST MEDICAL & SURGICAL HISTORY:  DM (diabetes mellitus)  HLD (hyperlipidemia)  HTN (hypertension)  Facial paralysis/[place] palsy  CAD (coronary artery disease)  CKD (chronic kidney disease) stage 3, GFR 30-59 ml/min  COPD (chronic obstructive pulmonary disease)  CHF (congestive heart failure)  Hypothyroid  Hypertension  Asthma, chronic  DM2 (diabetes mellitus, type 2)  S/P discectomy for herniated nucleus pulposus  Status post right hip replacement      MEDICATIONS  (STANDING):  n/a    Allergies    No Known Allergies        SOCIAL HISTORY:  Denies ETOh,Smoking,     FAMILY HISTORY:  Family history of diabetes mellitus  Family history unknown  Family history of diabetes mellitus  Family history of hypertension  Family history of hypertension  Family history of diabetes mellitus  No known family history of kidney disease        VITAL:  T(C): , Max: 38.6 (05-25 @ 13:23)  T(F): , Max: 101.4 (05-25 @ 13:23)  HR: 98  BP: 148/90  BP(mean): --  RR: 33  SpO2: 94%  Wt(kg): --            LABS:                        12.7   9.08  )-----------( 81       ( 25 May 2017 13:20 )             39.2     05-25    136  |  92<L>  |  43<H>  ----------------------------<  188<H>  5.3   |  27  |  3.92<H>    Ca    8.6      25 May 2017 13:20    TPro  7.2  /  Alb  4.3  /  TBili  0.4  /  DBili  x   /  AST  17  /  ALT  13  /  AlkPhos  105  05-25              RADIOLOGY & ADDITIONAL STUDIES:    EXAM:  RAD CHEST PORTABLE URGENT        PROCEDURE DATE:  May 25 2017         INTERPRETATION:  CLINICAL INDICATION: fever; tachypnea    EXAM:  Portable upright AP chest from 5/25/2017 at 1352. Compared to prior study   from 3/2/2016.    Slightly rotated right.    IMPRESSION:  Clear lungs. No pleural effusions or pneumothorax.    Stable cardiac and mediastinal silhouettes including aortic   calcifications.    Trachea midline.    Generalized osteopenia again noted.    Previously present right chest wall dual-lumen dialysis catheter not   present currently.

## 2017-05-25 NOTE — H&P ADULT - PROBLEM SELECTOR PLAN 2
- Likely in setting of COPD exacerbation with possible concomitant infection  - Will f/u blood cx  - C/w CAP coverage (Azithro/Ceftriaxone)  - Will also obtain urine legionella  - F/u RVP  - No IVF needed at this time

## 2017-05-25 NOTE — ED PROVIDER NOTE - ATTENDING CONTRIBUTION TO CARE
ED Attending Dr. Carlton: 81 yo male with COPD on home O2, asthma, ESRD on HD MWF, CAD, sent to ED from PMDs office due to SOB and low O2 saturation x few days.  No fever.  On exam pt uncomfortable appearing, heart RRR, lungs decreased breath sounds throughout with scant bibasilar crackles, abd NTND, extremities without swelling, strength 5/5 in all extremities and skin without rash.

## 2017-05-25 NOTE — ED PROVIDER NOTE - PMH
Asthma, chronic    CAD (coronary artery disease)    CHF (congestive heart failure)    CKD (chronic kidney disease) stage 3, GFR 30-59 ml/min    COPD (chronic obstructive pulmonary disease)    DM (diabetes mellitus)    Facial paralysis/[place] palsy    HLD (hyperlipidemia)    HTN (hypertension)    Hypothyroid

## 2017-05-25 NOTE — H&P ADULT - NSHPPHYSICALEXAM_GEN_ALL_CORE
GENERAL: No acute distress, on BiPAP  HEENT: PERRL  NECK: supple, no stiffness, no JVD, no thyromegaly  PULM: respirations non-labored, clear to auscultation bilaterally, no rales, rhonchi, or wheezes  CV: Tachycardic with irregular rate and rhythm, no murmurs, gallops, or rubs  GI: abdomen firm, mildly distended, nontender, no masses felt, normal bowel sounds  BACK: no spinal TTP. Previous lumbar spinal surgical scar seen.  : no genital lesions or ulcers  MSK: strength 5/5 bilateral upper/lower extremities. No joint swelling, erythema, or warmth.  LYMPH: no anterior cervical, posterior cervical, supraclavicular, or inguinal lymphadenopathy  NEURO: no tremors, sensation intact  SKIN: slightly mottled lower extremities. No edema. AVF in L arm. GENERAL: Alert, does not appear confused. No acute distress, on BiPAP comfortably. Some medicines have older prescription dates (ex 01/2017 for 30 day course). Noted a prescription for Cephalexin, 7 day course BID, prescribed 5/16/17, with 4 tablets left.  HEENT: PERRL  NECK: supple, no stiffness, no JVD, no thyromegaly  PULM: respirations non-labored, clear to auscultation bilaterally, no rales, rhonchi, or wheezes  CV: Tachycardic with irregular rate and rhythm, no murmurs, gallops, or rubs  GI: abdomen firm, mildly distended, nontender, no masses felt, normal bowel sounds  BACK: no spinal TTP. Previous lumbar spinal surgical scar seen.  : no genital lesions or ulcers  MSK: strength 5/5 bilateral upper/lower extremities. No joint swelling, erythema, or warmth.  LYMPH: no anterior cervical, posterior cervical, supraclavicular, or inguinal lymphadenopathy  NEURO: no tremors, sensation intact  SKIN: slightly mottled lower extremities. 1+ ankle edema. AVF in L arm.

## 2017-05-25 NOTE — H&P ADULT - HISTORY OF PRESENT ILLNESS
Pt is on BiPAP and was unable to provide adequate history. History obtained over the phone from son, Mr. Kenneth Edwards ([phone]), who also spoke with pt's wife, Ms. Miriam [place].    83 yo M with a PMH ESRD on HD MWF, CAD, HTN, DM, COPD (supposed to be on home O2 but pt does not use), diastolic CHF (EF 65%), HLD, anemia of chronic disease, ?"liver problems" and hypothyroidism p/from PCP with SOB x1 day. Per the son, the patient complained of SOB, not related to exertion or position, several hours after dialysis. Also endorsed NP cough. Son states the patient felt like "he had a cold," but denied congestion or rhinorrhea. The SOB remained the same through the night and into the morning, where he went to his PCP. Dr. Blake Edwards, who recommended that the patient go to the ED.    Of note, he gets HD MWF. The patient did not complain of fevers, chills, CP, palpitations, N/V, or abdominal pain. The son notes he has home oxygen but has not used it. No family history of lung disease, heart disease, or cancer.    In the ED, he was given Vancomycin 1g, Cefepime 1, Ciprofloxacin 400, and PO Tylenol  ED VS: 97.4 --> 101.4, , /68, RR 24 --> 33, O2 100% on 4L NC -> BiPAP FiO2 40% Pt is on BiPAP and was unable to provide adequate history. History obtained over the phone from son, Mr. Kenneth Edwards ([phone]), who also spoke with pt's wife, Ms. Miriam [place].    81 yo M with a PMH ESRD on HD MWF, CAD, HTN, DM, COPD (supposed to be on home O2 but pt does not use), diastolic CHF (EF 65%), HLD, anemia of chronic disease, ?"liver problems" and hypothyroidism p/from PCP with SOB x1 day. Per the son, the patient complained of SOB, not related to exertion or position, several hours after dialysis. Also endorsed NP cough. Son states the patient felt like "he had a cold," but denied congestion or rhinorrhea. The SOB remained the same through the night and into the morning, where he went to his PCP. Dr. Blake Edwards, who recommended that the patient go to the ED.    Of note, he gets HD MWF. The patient did not complain of fevers, chills, CP, palpitations, N/V, or abdominal pain. The son notes he has home oxygen but has not used it. No family history of lung disease, heart disease, or cancer.    Per son, he uses 10 units Lantus at night and 8 units Humalog once in the AM. After calling pharmacy, they state he has his prescription for 16 units Lantus in the evening, and 3 units Humalog three times a day with meals.    In the ED, he was given Vancomycin 1g, Cefepime 1, Ciprofloxacin 400, and PO Tylenol  ED VS: 97.4 --> 101.4, , /68, RR 24 --> 33, O2 100% on 4L NC -> BiPAP FiO2 40%

## 2017-05-25 NOTE — ED PROVIDER NOTE - MEDICAL DECISION MAKING DETAILS
82M with concern for PNA vs. COPD exacerbation. cbc, cmp, ekg, cxr, abx, antipyretics. Will give careful hydration, not 30cc/kg bolus due to hx of ESRD on HD and concern for possible fluid overload.

## 2017-05-25 NOTE — ED ADULT NURSE NOTE - OBJECTIVE STATEMENT
Alert, awake, appears to be in moderate distress.  Breathing labored with 90% O2 sat on room air.  Tachypnic 27.  Decreased breath sounds on bilateral lung ant.& post. lung fields.  No wheezing.  Tripod position.  Sinus rhythm on the monitor, at 98.  Febrile 101.4 rectally.  Seen by Bianka Persaud.  IV accessed on R ac 20G.  Sepsis workup done.  Receiving Vancomycin.

## 2017-05-25 NOTE — H&P ADULT - NSHPOUTPATIENTPROVIDERS_GEN_ALL_CORE
Dr. Frank Edwards, PCP ([phone]), Dr. Kostas Minor (Cardiology, [phone]), Dr. Isauro Arizmendi (Pulmonology), Dr. Wily Parnell (Nephrology)

## 2017-05-25 NOTE — H&P ADULT - NSHPLABSRESULTS_GEN_ALL_CORE
Labs personally reviewed and interpreted. Notable for no leukocytosis, H/H stable at 12.7. Thrombocytopenia of 81 with giant platelets. VBG pH 7.21 with pCO2 80, and lactate 1.6.  BMP notable for non-hemolyzed potassium 5.3, BUN 43, creatinine 3.92. AG 17.    CXR personally reviewed and interpreted - small mediastinal lymphadenopathy, unchanged from previous CXR. No cardiomegaly, consolidation, or effusions.    EKG personally read and interpreted - sinus rhythm, normal axis deviation, rate 97,  (1st degree block), and QTc 439. TWI aVL.

## 2017-05-25 NOTE — H&P ADULT - NSHPREVIEWOFSYSTEMS_GEN_ALL_CORE
Limited, as it was obtained from the son.    Gen: negative for fevers, chills, anorexia, weight loss, weight gain  Eyes: no blurred vision or lacrimation  ENT: no tinnitus or vertigo  Resp: + for dyspnea, no BATISTA, pleuritic chest pain, or hemoptysis  CV: no chest pain, dyspnea on exertion, or palpitations  GI: + constipation. No nausea, vomiting, abdominal pain, diarrhea, melena, or hematochezia  : no dysuria, hematuria, discharge, or incontinence  MSK: no arthralgias, joint stiffness, joint swelling, or myalgias  Neuro: no focal deficits, confusion, weakness, dizziness, tremors, or seizures  Skin: no rash, lesions, or edema

## 2017-05-25 NOTE — ED ADULT NURSE NOTE - ED STAT RN HANDOFF DETAILS
endorsed to rn william. pt a*o x3, tolerating bipap well. no respiratory distress noted. safety maintained endorsed to rn siggy. pt a*o x3, tolerating bipap well. no respiratory distress noted. safety maintained. droplet precaution maintained. transported with respiratory. nad.

## 2017-05-25 NOTE — H&P ADULT - NSHPSOCIALHISTORY_GEN_ALL_CORE
Patient is a former smoker, 2-3 cigarettes a day for 10-15 years, stopped 10 years ago.  Also has an extensive Ethanol use history, stopped 20 years ago.  Lives at home with his wife, son, and his 2 grandchildren. No home aid.  Able to perform ADLs on his own, walks with a walker or cane. Needs to be driven to appointments.

## 2017-05-25 NOTE — H&P ADULT - PROBLEM SELECTOR PLAN 3
- Coreg not indicated in diastolic HF. However, as pt is on it, and it also has some antihypertensive effect, will continue  - Sxs unlikely 2/2 HF exacerbation, jaime in setting of BMP only 790, but will obtain TTE to evaluate cardiac predisposition to cardiopulmonary failure. Last TTE in 2015 shows mild diastolic dysfunction, LV EF 65%  - ACE inhibitors are tolerated in dialysis, but in setting of pt receiving EPO, which will increase the risk of hyperkalemia (and will defer to pt's cardiologist).

## 2017-05-25 NOTE — ED PROVIDER NOTE - OBJECTIVE STATEMENT
82M COPD, asthma, ESRD on HD (last HD yesterday, received full session), CAD sent form PMD's office for hypoxia and SOB. Pt on home O2 (unsure how much). Pt's wife states pt has been becoming persistently more SOB over the past few days, worse with exertion. No chest pain, cough, fever/chills, NVD or abd pain. No recent travel. Pt febrile, tachypneic and tachycardic in ED.

## 2017-05-25 NOTE — CONSULT NOTE ADULT - ASSESSMENT
83 y/o male PMHx of DM, HTN, ESRD on HD, COPD, CAD  p/w SOB/tachypnea/likely COPD flare/febrile illness

## 2017-05-25 NOTE — CONSULT NOTE ADULT - NEGATIVE NEUROLOGICAL SYMPTOMS
no hemiparesis/no loss of consciousness/no syncope/no transient paralysis/no headache/no generalized seizures/no confusion/no focal seizures/no weakness/no paresthesias

## 2017-05-25 NOTE — ED ADULT NURSE NOTE - CHIEF COMPLAINT
The patient is a 82y Male complaining of The patient is a 82y Male sent from office for torres, hypoxemia. Pt is on dialysis M/W/F, completed full course of HD yesterday.  COPD, on 2L/min at home.

## 2017-05-25 NOTE — H&P ADULT - ASSESSMENT
83 yo M with a PMH ESRD on HD MWF, CAD, HTN, DM, COPD (supposed to be on home O2 but pt does not use), diastolic CHF (EF 65%), HLD, anemia of chronic disease, ?"liver problems" and hypothyroidism p/from PCP with SOB x1 day, likely 2/2 COPD exacerbation.

## 2017-05-25 NOTE — H&P ADULT - PROBLEM SELECTOR PLAN 5
- Pt's nephrologist, Dr. Wily Parnell, notified  - Pt will have HD MWF  - Renally dose all meds, avoid nephrotoxins

## 2017-05-25 NOTE — ED PROVIDER NOTE - PROGRESS NOTE DETAILS
biPAP initiated for CO2 of 80. Improved on BIPAP, more comfortable, less tachypneic. Discussed with Dr. Arizmendi, requests admission to Dr. Edwards. States believes pt has had BIPAP in the past in hospital, but does not use at home. Dr. Edwards paged. Accepted for admission to [facility] textpage sent. Family amenable to plan. Floor concerns regarding taking pt on BIPAP, request RCU/PCU. D/w Dr. Kemp who is amenable to PCU. Pulm called. D/w pulm fellow. Accepted to U under Dr. Lisker. MAR textpage sent.

## 2017-05-26 NOTE — CONSULT NOTE ADULT - ATTENDING COMMENTS
Patient seen and examined, agree with resident exam, assessment, and plan.  82M p/w SOB and cough- found with parainfluenza and exacerbation of COPD.  Patient initially admitted to RCU for monitoring and management of acute on chronic respiratory failure with hypercapnea.  Patient persistently acidemic despite uptitration of bipap on floors- transferred to MICU for intubation and mechanical ventilation.  Will continue mechanical ventilation and blood gas measurements, will continue bronchodilators and glucocorticoids for COPD exacerbation.  Although with parainfluenza, will continue ceftriaxone and azithromycin until all cultures return.  Will follow up chest xray post intubation to confirm ETT placement.

## 2017-05-26 NOTE — PROGRESS NOTE ADULT - PROBLEM SELECTOR PLAN 2
RVP positive for parainfluenza.  s/p intubation 2/2 increased work of breathing and non-compliance w/ BiPAP.  On prednisone.  On glucerna feeds via OGT, consider changing to nepro.  Management per MICU

## 2017-05-26 NOTE — CHART NOTE - NSCHARTNOTEFT_GEN_A_CORE
: Dr. Zeus Goldsmith    INDICATION: Bedside rounds     PROCEDURE:  [x] LIMITED ECHO  [x] LIMITED CHEST  [ ] LIMITED RETROPERITONEAL  [ ] LIMITED ABDOMINAL  [ ] LIMITED DVT  [ ] NEEDLE GUIDANCE VASCULAR  [ ] NEEDLE GUIDANCE THORACENTESIS  [ ] NEEDLE GUIDANCE PARACENTESIS  [ ] NEEDLE GUIDANCE PERICARDIOCENTESIS  [ ] OTHER    FINDINGS:  - A- LINES ANTERIORLY BILATERALLY  - LUNG SLIDING BILATERALLY  - NO FOCAL CONSOLIDATIONS  - CARDIAC EXAM DIFFICULT TO ASSESS : Dr. Zeus Goldsmith    INDICATION: respiratory failure patient on ventilator and vasopressors     PROCEDURE:  [x] LIMITED ECHO  [x] LIMITED CHEST  [ ] LIMITED RETROPERITONEAL  [ ] LIMITED ABDOMINAL  [ ] LIMITED DVT  [ ] NEEDLE GUIDANCE VASCULAR  [ ] NEEDLE GUIDANCE THORACENTESIS  [ ] NEEDLE GUIDANCE PARACENTESIS  [ ] NEEDLE GUIDANCE PERICARDIOCENTESIS  [ ] OTHER    FINDINGS:  - A- LINES ANTERIORLY BILATERALLY  - LUNG SLIDING BILATERALLY  - NO FOCAL CONSOLIDATIONS  - CARDIAC EXAM DIFFICULT TO ASSESS    no effusions or signs of consolidation  difficult cardiac windows

## 2017-05-26 NOTE — CONSULT NOTE ADULT - ASSESSMENT
83 yo M with a PMH ESRD on HD MWF, CAD, HTN, DM, COPD (supposed to be on home O2 but pt does not use) (also likely HANY), diastolic CHF (EF 65%), HLD, anemia of chronic disease, ?"liver problems" and hypothyroidism admitted for COPD exacerbation in setting of parainfluenza virus. Transferred to MICU for hypercarbic respiratory failure and then intubated for worsening respiratory acidosis on BiPAP    #Neuro- sedated on propofol   #CV- HFpEF, no signs of volume overload, c/w ASA, holding BP meds Coreg, amlodipine and Imdur, currently on Levo to maintain while sedated   #Pulm- respiratory acidosis requiring intubation, 2/2 COPD exacerbation 2/2 parainfluenza, on solumedrol 20mg Q8hrs, albuterol and ipratropium Q6hrs   #GI- OG tube in place, c/w home protonix   #Renal- ESRD on HD, due for HD today   #ID- parainfluenza +, sent bcx, c/w azithromycin and ceftriaxone for COPD exacerbation, can d/c azithro if legionella neg   #Endocrine- DM2 on FS Q6 while NPO and ISS, f/u A1c, holding Lantus 5u basal insulin while NPO   #PPx- HSQ

## 2017-05-26 NOTE — PROGRESS NOTE ADULT - ASSESSMENT
81 y/o male PMHx of DM, HTN, ESRD on HD, COPD, CAD  p/w SOB/tachypnea/likely COPD flare/febrile illness

## 2017-05-26 NOTE — PROGRESS NOTE ADULT - SUBJECTIVE AND OBJECTIVE BOX
Dr. Stacia Parnell    NEPHROLOGY-[facility]        Patient seen and examined @ bedside.  On HD at present.  Intubated/sedated        MEDICATIONS  (STANDING):  atorvastatin 40milliGRAM(s) Oral at bedtime  senna 2Tablet(s) Oral at bedtime  montelukast 10milliGRAM(s) Oral daily  ascorbic acid 500milliGRAM(s) Oral daily  levothyroxine 75MICROGram(s) Oral daily  calcium acetate 667milliGRAM(s) Oral three times a day with meals  multivitamin 1Tablet(s) Oral daily  azithromycin  IVPB  IV Intermittent   cefTRIAXone   IVPB 1Gram(s) IV Intermittent every 24 hours  azithromycin  IVPB 500milliGRAM(s) IV Intermittent every 24 hours  propofol Infusion 50MICROgram(s)/kG/Min IV Continuous <Continuous>  norepinephrine Infusion 0.03MICROgram(s)/kG/Min IV Continuous <Continuous>  heparin  Injectable 5000Unit(s) SubCutaneous every 8 hours  aspirin  chewable 81milliGRAM(s) Oral daily  pantoprazole   Suspension 40milliGRAM(s) Oral daily  ipratropium 17 MICROgram(s) HFA Inhaler 1Puff(s) Inhalation every 6 hours  insulin lispro (HumaLOG) corrective regimen sliding scale  SubCutaneous every 6 hours  dextrose 5%. 1000milliLiter(s) IV Continuous <Continuous>  dextrose 50% Injectable 12.5Gram(s) IV Push once  dextrose 50% Injectable 25Gram(s) IV Push once  dextrose 50% Injectable 25Gram(s) IV Push once  fentaNYL   Infusion 2MICROgram(s)/kG/Hr IV Continuous <Continuous>  chlorhexidine 4% Liquid 1Application(s) Topical daily  ALBUTerol    90 MICROgram(s) HFA Inhaler 2Puff(s) Inhalation every 6 hours  predniSONE Concentrate 40milliGRAM(s) Oral once      VITAL:  T(C): , Max: 37.1 ( @ 22:03)  T(F): , Max: 98.8 ( @ 22:03)  HR: 74  BP: 108/66  BP(mean): 77  RR: 18  SpO2: 93%  Wt(kg): --    I and O's:    I & Os for current day (as of  @ 13:52)  =============================================  IN: 0 ml / OUT: 90 ml / NET: -90 ml    Height (cm): 170.2 ( @ 23:28)  Weight (kg): 83.6 ( @ 23:28)  BMI (kg/m2): 28.9 ( @ 23:28)  BSA (m2): 1.95 ( @ 23:28)    PHYSICAL EXAM:    Constitutional: NAD; Intubated/sedated  HEENT: PERRLA    Neck:  No JVD  Respiratory: decreased breath sounds  Cardiovascular: S1 and S2  Gastrointestinal: BS+, soft, NT/ND  Extremities: No peripheral edema  : +hameed  Skin: No rashes  Access:Left radial fistual +thrill    LABS:                        12.3   7.91  )-----------( 85       ( 26 May 2017 08:15 )             37.8         133<L>  |  88<L>  |  64<H>  ----------------------------<  287<H>  6.5<HH>   |  21<L>  |  5.18<H>    Ca    7.7<L>      26 May 2017 08:15  Phos  8.2       Mg     2.4         TPro  7.2  /  Alb  4.3  /  TBili  0.4  /  DBili  x   /  AST  17  /  ALT  13  /  AlkPhos  105            Urine Studies:  Urinalysis Basic - ( 25 May 2017 17:00 )    Color: YELLOW / Appearance: CLEAR / S.024 / pH: 6.0  Gluc: NEGATIVE / Ketone: NEGATIVE  / Bili: NEGATIVE / Urobili: 2 E.U.   Blood: NEGATIVE / Protein: 300 / Nitrite: NEGATIVE   Leuk Esterase: SMALL / RBC: 0-2 / WBC 2-5   Sq Epi: x / Non Sq Epi: FEW / Bacteria: SMALL            RADIOLOGY & ADDITIONAL STUDIES:      EXAM:  RAD CHEST PORTABLE URGENT        PROCEDURE DATE:  May 26 2017         INTERPRETATION:  TIME OF EXAM: May 26, 2017 at 9:22 AM    CLINICAL INFORMATION: Post intubation and orogastric tube placement.    TECHNIQUE:   Portable chest    INTERPRETATION:     An endotracheal and enteric tube are in place. Visualized lungs are   clear. Heart is not enlarged and there is no effusion or pneumothorax.      COMPARISON:  May 25      IMPRESSION:  Post intubation and enteric tube placement. Dr. Stacia Parnell    NEPHROLOGY-[facility]        Patient seen and examined @ bedside.  On HD at present.  Intubated/sedated.  On pressors        MEDICATIONS  (STANDING):  atorvastatin 40milliGRAM(s) Oral at bedtime  senna 2Tablet(s) Oral at bedtime  montelukast 10milliGRAM(s) Oral daily  ascorbic acid 500milliGRAM(s) Oral daily  levothyroxine 75MICROGram(s) Oral daily  calcium acetate 667milliGRAM(s) Oral three times a day with meals  multivitamin 1Tablet(s) Oral daily  azithromycin  IVPB  IV Intermittent   cefTRIAXone   IVPB 1Gram(s) IV Intermittent every 24 hours  azithromycin  IVPB 500milliGRAM(s) IV Intermittent every 24 hours  propofol Infusion 50MICROgram(s)/kG/Min IV Continuous <Continuous>  norepinephrine Infusion 0.03MICROgram(s)/kG/Min IV Continuous <Continuous>  heparin  Injectable 5000Unit(s) SubCutaneous every 8 hours  aspirin  chewable 81milliGRAM(s) Oral daily  pantoprazole   Suspension 40milliGRAM(s) Oral daily  ipratropium 17 MICROgram(s) HFA Inhaler 1Puff(s) Inhalation every 6 hours  insulin lispro (HumaLOG) corrective regimen sliding scale  SubCutaneous every 6 hours  dextrose 5%. 1000milliLiter(s) IV Continuous <Continuous>  dextrose 50% Injectable 12.5Gram(s) IV Push once  dextrose 50% Injectable 25Gram(s) IV Push once  dextrose 50% Injectable 25Gram(s) IV Push once  fentaNYL   Infusion 2MICROgram(s)/kG/Hr IV Continuous <Continuous>  chlorhexidine 4% Liquid 1Application(s) Topical daily  ALBUTerol    90 MICROgram(s) HFA Inhaler 2Puff(s) Inhalation every 6 hours  predniSONE Concentrate 40milliGRAM(s) Oral once      VITAL:  T(C): , Max: 37.1 ( @ 22:03)  T(F): , Max: 98.8 ( @ 22:03)  HR: 74  BP: 108/66  BP(mean): 77  RR: 18  SpO2: 93%  Wt(kg): --    I and O's:    I & Os for current day (as of  @ 13:52)  =============================================  IN: 0 ml / OUT: 90 ml / NET: -90 ml    Height (cm): 170.2 ( @ 23:28)  Weight (kg): 83.6 ( @ 23:28)  BMI (kg/m2): 28.9 ( @ 23:28)  BSA (m2): 1.95 ( @ 23:28)    PHYSICAL EXAM:    Constitutional: NAD; Intubated/sedated  HEENT: PERRLA    Neck:  No JVD  Respiratory: decreased breath sounds  Cardiovascular: S1 and S2  Gastrointestinal: BS+, soft, NT/ND  Extremities: No peripheral edema  : +hameed  Skin: No rashes  Access:Left radial fistual +thrill    LABS:                        12.3   7.91  )-----------( 85       ( 26 May 2017 08:15 )             37.8         133<L>  |  88<L>  |  64<H>  ----------------------------<  287<H>  6.5<HH>   |  21<L>  |  5.18<H>    Ca    7.7<L>      26 May 2017 08:15  Phos  8.2       Mg     2.4         TPro  7.2  /  Alb  4.3  /  TBili  0.4  /  DBili  x   /  AST  17  /  ALT  13  /  AlkPhos  105            Urine Studies:  Urinalysis Basic - ( 25 May 2017 17:00 )    Color: YELLOW / Appearance: CLEAR / S.024 / pH: 6.0  Gluc: NEGATIVE / Ketone: NEGATIVE  / Bili: NEGATIVE / Urobili: 2 E.U.   Blood: NEGATIVE / Protein: 300 / Nitrite: NEGATIVE   Leuk Esterase: SMALL / RBC: 0-2 / WBC 2-5   Sq Epi: x / Non Sq Epi: FEW / Bacteria: SMALL            RADIOLOGY & ADDITIONAL STUDIES:      EXAM:  RAD CHEST PORTABLE URGENT        PROCEDURE DATE:  May 26 2017         INTERPRETATION:  TIME OF EXAM: May 26, 2017 at 9:22 AM    CLINICAL INFORMATION: Post intubation and orogastric tube placement.    TECHNIQUE:   Portable chest    INTERPRETATION:     An endotracheal and enteric tube are in place. Visualized lungs are   clear. Heart is not enlarged and there is no effusion or pneumothorax.      COMPARISON:  May 25      IMPRESSION:  Post intubation and enteric tube placement.

## 2017-05-26 NOTE — PATIENT PROFILE ADULT. - NS TRANSFER DISPOSITION PATIENT BELONGINGS
given to family/silver ring removed from right ring finger on May 28th and given to patients angel Latif.  on 5/29 red string bracelets removed from pt and given to pt jatinder Dickey.

## 2017-05-26 NOTE — PROGRESS NOTE ADULT - PROBLEM SELECTOR PLAN 3
appears euvolemic.  On Levo appears euvolemic.  On Levo likely secondary to sedation.  Diastolic heart failure but seems compensated at present

## 2017-05-26 NOTE — CONSULT NOTE ADULT - PROBLEM SELECTOR RECOMMENDATION 4
on hemodialysis: getting HD now
s/p empiric IV abx and Tylenol.  Blood cultures, and Respiratory viral panel are ordered and Pending

## 2017-05-26 NOTE — PROCEDURE NOTE - NSTRACHPOSTINTU_RESP_A_CORE
Positive end tidal Co2 noted/Breath sounds equal/Appropriate capnography/Breath sounds bilateral/Chest excursion noted

## 2017-05-26 NOTE — PROCEDURE NOTE - ADDITIONAL PROCEDURE DETAILS
Patient with dry mucous membranes after enduring bipap for nearly the entire night, there was a slight abrasion in the posterior oropharynx during initial attempt with MAC 3 blade, bleeding was self limited, there was no aspiration, intubation successful with size 3 sheath cover on glidescope.

## 2017-05-26 NOTE — CONSULT NOTE ADULT - SUBJECTIVE AND OBJECTIVE BOX
Patient is a 82y old  Male who presents with a chief complaint of   Patient was seen by me in my office on 2017 and at that time he was prescribed prednisone for increasing SOB   Now patient is intubated, following history and physical is reviewed     HPI:  Pt is on BiPAP and was unable to provide adequate history. History obtained over the phone from son, Mr. Kenneth Edwards ([phone]), who also spoke with pt's wife, Ms. Miriam [place].    81 yo M with a PMH ESRD on HD MWF, CAD, HTN, DM, COPD (supposed to be on home O2 but pt does not use), diastolic CHF (EF 65%), HLD, anemia of chronic disease, ?"liver problems" and hypothyroidism p/from PCP with SOB x1 day. Per the son, the patient complained of SOB, not related to exertion or position, several hours after dialysis. Also endorsed NP cough. Son states the patient felt like "he had a cold," but denied congestion or rhinorrhea. The SOB remained the same through the night and into the morning, where he went to his PCP. Dr. Blake Edwards, who recommended that the patient go to the ED.    Of note, he gets HD MWF. The patient did not complain of fevers, chills, CP, palpitations, N/V, or abdominal pain. The son notes he has home oxygen but has not used it. No family history of lung disease, heart disease, or cancer.    Per son, he uses 10 units Lantus at night and 8 units Humalog once in the AM. After calling pharmacy, they state he has his prescription for 16 units Lantus in the evening, and 3 units Humalog three times a day with meals.    In the ED, he was given Vancomycin 1g, Cefepime 1, Ciprofloxacin 400, and PO Tylenol  ED VS: 97.4 --> 101.4, , /68, RR 24 --> 33, O2 100% on 4L NC -> BiPAP FiO2 40% (25 May 2017 17:04)      ?FOLLOWING PRESENT  [ ] Hx of PE/DVT, [x ] Hx COPD, [ ] Hx of Asthma, [ ] Hx of Hospitalization, [ ]  Hx of BiPAP/CPAP use, [ ] Hx of HANY    Allergies    No Known Allergies    Intolerances        PAST MEDICAL & SURGICAL HISTORY:  DM (diabetes mellitus)  HLD (hyperlipidemia)  HTN (hypertension)  Facial paralysis/[place] palsy  CAD (coronary artery disease)  CKD (chronic kidney disease) stage 3, GFR 30-59 ml/min  COPD (chronic obstructive pulmonary disease)  CHF (congestive heart failure)  Hypothyroid  Hypertension  Asthma, chronic  DM2 (diabetes mellitus, type 2)  S/P discectomy for herniated nucleus pulposus  Status post right hip replacement      FAMILY HISTORY:  Family history of diabetes mellitus (Mother)  Family history unknown  Family history of diabetes mellitus  Family history of hypertension  Family history of hypertension  Family history of diabetes mellitus      Social History: [ x ] TOBACCO: ex                   [  x] ETOH    :ex                             [ - ] IVDA/DRUGS    REVIEW OF SYSTEMS   cant obtain     General:	    Skin/Breast:  	  Ophthalmologic:  	  ENMT:	    Respiratory and Thorax:  	  Cardiovascular:	    Gastrointestinal:	    Genitourinary:	    Musculoskeletal:	    Neurological:	    Psychiatric:	    Hematology/Lymphatics:	    Endocrine:	    Allergic/Immunologic:	    MEDICATIONS  (STANDING):  atorvastatin 40milliGRAM(s) Oral at bedtime  senna 2Tablet(s) Oral at bedtime  montelukast 10milliGRAM(s) Oral daily  ascorbic acid 500milliGRAM(s) Oral daily  levothyroxine 75MICROGram(s) Oral daily  calcium acetate 667milliGRAM(s) Oral three times a day with meals  multivitamin 1Tablet(s) Oral daily  azithromycin  IVPB  IV Intermittent   cefTRIAXone   IVPB 1Gram(s) IV Intermittent every 24 hours  azithromycin  IVPB 500milliGRAM(s) IV Intermittent every 24 hours  propofol Infusion 50MICROgram(s)/kG/Min IV Continuous <Continuous>  norepinephrine Infusion 0.03MICROgram(s)/kG/Min IV Continuous <Continuous>  heparin  Injectable 5000Unit(s) SubCutaneous every 8 hours  aspirin  chewable 81milliGRAM(s) Oral daily  pantoprazole   Suspension 40milliGRAM(s) Oral daily  ipratropium 17 MICROgram(s) HFA Inhaler 1Puff(s) Inhalation every 6 hours  insulin lispro (HumaLOG) corrective regimen sliding scale  SubCutaneous every 6 hours  dextrose 5%. 1000milliLiter(s) IV Continuous <Continuous>  dextrose 50% Injectable 12.5Gram(s) IV Push once  dextrose 50% Injectable 25Gram(s) IV Push once  dextrose 50% Injectable 25Gram(s) IV Push once  fentaNYL   Infusion 2MICROgram(s)/kG/Hr IV Continuous <Continuous>  chlorhexidine 4% Liquid 1Application(s) Topical daily  ALBUTerol    90 MICROgram(s) HFA Inhaler 2Puff(s) Inhalation every 6 hours  predniSONE Concentrate 40milliGRAM(s) Oral once    MEDICATIONS  (PRN):  docusate sodium 100milliGRAM(s) Oral three times a day PRN Constipation  acetaminophen   Tablet 650milliGRAM(s) Oral every 6 hours PRN For Temp greater than 38 C (100.4 F)  dextrose Gel 1Dose(s) Oral once PRN Blood Glucose LESS THAN 70 milliGRAM(s)/deciliter  glucagon  Injectable 1milliGRAM(s) IntraMuscular once PRN Glucose LESS THAN 70 milligrams/deciliter       Vital Signs Last 24 Hrs  T(C): 36.3, Max: 37.1 (05-25 @ 22:03)  T(F): 97.4, Max: 98.8 (05-25 @ 22:03)  HR: 74 (70 - 106)  BP: 108/66 (93/51 - 168/84)  BP(mean): 77 (61 - 97)  RR: 18 (17 - 30)  SpO2: 93% (93% - 100%)    Mode: AC/ CMV (Assist Control/ Continuous Mandatory Ventilation)  RR (machine): 18  TV (machine): 450  FiO2: 40  PEEP: 5  ITime: 1  MAP: 10  PIP: 30      I&O's Summary    I & Os for current day (as of 26 May 2017 14:52)  =============================================  IN: 0 ml / OUT: 90 ml / NET: -90 ml      Physical Exam:   GENERAL: NAD, well-groomed, well-developed  HEENT: MEHNAZ/   Atraumatic, Normocephalic  ENMT: No tonsillar erythema, exudates, or enlargement; Moist mucous membranes, Good dentition, No lesions  NECK: Supple, No JVD, Normal thyroid  CHEST/LUNG: very coarse breath sounds bilaterally   CVS: Regular rate and rhythm; No murmurs, rubs, or gallops  GI: : obese : limited exam: Bowel sounds  present   NERVOUS SYSTEM: sedated and intubated   EXTREMITIES:  2+ Peripheral Pulses, No clubbing, cyanosis, or edema  LYMPH: No lymphadenopathy noted  SKIN: No rashes or lesions  ENDOCRINOLOGY: No Thyromegaly  PSYCH: sedated    Labs:  ABG - ( 26 May 2017 12:15 )  pH: 7.22  /  pCO2: 63    /  pO2: 47    / HCO3: 21    / Base Excess: -1.8  /  SaO2: 77.1                        12.3   7.91  )-----------( 85       ( 26 May 2017 08:15 )             37.8         133<L>  |  88<L>  |  64<H>  ----------------------------<  287<H>  6.5<HH>   |  21<L>  |  5.18<H>    Ca    7.7<L>      26 May 2017 08:15  Phos  8.2       Mg     2.4         TPro  7.2  /  Alb  4.3  /  TBili  0.4  /  DBili  x   /  AST  17  /  ALT  13  /  AlkPhos  105      CAPILLARY BLOOD GLUCOSE  268 (26 May 2017 12:00)  246 (25 May 2017 23:28)  226 (25 May 2017 22:03)    LIVER FUNCTIONS - ( 25 May 2017 13:20 )  Alb: 4.3 g/dL / Pro: 7.2 g/dL / ALK PHOS: 105 u/L / ALT: 13 u/L / AST: 17 u/L / GGT: x             Urinalysis Basic - ( 25 May 2017 17:00 )    Color: YELLOW / Appearance: CLEAR / S.024 / pH: 6.0  Gluc: NEGATIVE / Ketone: NEGATIVE  / Bili: NEGATIVE / Urobili: 2 E.U.   Blood: NEGATIVE / Protein: 300 / Nitrite: NEGATIVE   Leuk Esterase: SMALL / RBC: 0-2 / WBC 2-5   Sq Epi: x / Non Sq Epi: FEW / Bacteria: SMALL      D DImer  Serum Pro-Brain Natriuretic Peptide: 790.0 pg/mL ( @ 13:20)      Wound culture:                 @ 13:48  Organism --  Culture w/ gram stain --  Specimen Source BLOOD PERIPHERAL        Abscess culture:              @ 13:48  Organism --  Gram Stain --  Specimen Source BLOOD PERIPHERAL            Tissue culture:            @ 13:48  Organism --  Gram Stain --  Specimen Source BLOOD PERIPHERAL        Body Fluid Smear & Culture:                         @ 13:48  AFB Smear  --  Culture Acid Fast Body Fluid w/ Smear  --  Culture Acid Fast Smear Concentrated   --    Culture Results:     --  Specimen Source BLOOD PERIPHERAL  Rapid Respiratory Viral Panel Result         @ 17:00  Rapid RVP --  Coronovirus NOT DETECTED  Adenovirus NOT DETECTED  Bordetella Pertussis NOT DETECTED  Chlamydia Pneumonia NOT DETECTED  Entero/RhinovirusNOT DETECTED  HKU1 Coronovirus NOT DETECTED  HMPV Coronovirus NOT DETECTED  Influenza A NOT DETECTED (any subtype)  Influenza AH1 NOT DETECTED  Influenza AH1 2009 NOT DETECTED  Influenza AH3 NOT DETECTED  Influenza B NOT DETECTED  Mycoplasma Pneumoniae NOT DETECTED This nucleic acid amplification assay was performed using  the "[employer]. The following pathogens are tested  for: Adenovirus, Coronavirus 229E, Coronavirus HKU1,  Coronavirus NL63, Coronavirus OC43, Human Metapneumovirus  (HMPV),  NL63 Coronovirus NOT DETECTED  OC43 Coronovirus NOT DETECTED  Parainfluenza 1 NOT DETECTED  Parainfluenza 2 NOT DETECTED  Parainfluenza 3 POSITIVE  Parainfluenza 4 NOT DETECTED  Resp Syncytial Virus NOT DETECTED        Studies  Chest X-RAY  EXAM:  RAD CHEST PORTABLE URGENT        PROCEDURE DATE:  May 26 2017         INTERPRETATION:  TIME OF EXAM: May 26, 2017 at 9:22 AM    CLINICAL INFORMATION: Post intubation and orogastric tube placement.    TECHNIQUE:   Portable chest    INTERPRETATION:     An endotracheal and enteric tube are in place. Visualized lungs are   clear. Heart is not enlarged and there is no effusion or pneumothorax.      COMPARISON:  May 25      IMPRESSION:  Post intubation and enteric tube placement.  CT SCAN Chest   CT Abdomen  Venous Dopplers: LE:   Others

## 2017-05-26 NOTE — PROGRESS NOTE ADULT - PROBLEM SELECTOR PLAN 1
HD today @ bedside.  BP soft at beginning of treatment and is on pressors.  BP slowly improving during treatment and currently 133/62. HD today @ bedside.  BP soft at beginning of treatment and is on pressors.  BP slowly improving during treatment and currently 133/62.  Next HD possibly on Monday

## 2017-05-26 NOTE — CONSULT NOTE ADULT - SUBJECTIVE AND OBJECTIVE BOX
MICU Consult and Accept Note     81 yo M with a PMH ESRD on HD MWF, CAD, HTN, DM, COPD (supposed to be on home O2 but pt does not use) (also likely HANY), diastolic CHF (EF 65%), HLD, anemia of chronic disease, ?"liver problems" and hypothyroidism p/from PCP with SOB x1 day admitted for likely COPD exacerbation in setting of parainfluenza.   Patient was placed in BiPAP for respiratory distress and hypercarbic respiratory acidosis on admission. While on the floor, the patient was agitated and noncompliant with his BiPAP. Labs also showed worsening respiratory acidosis.       Blood Gas Arterial - Lytes,Hgb,iCa,Lact (05.25.17 @ 22:50)    pH, Arterial: 7.19 pH    pCO2, Arterial: 73 mmHg    pO2, Arterial: 110 mmHg    HCO3, Arterial: 22 mmol/L    Oxygen Saturation, Arterial: 97.6 %     Then:     ABG - ( 26 May 2017 00:20 )  pH: 7.17  /  pCO2: 76    /  pO2: 101   / HCO3: 22    / Base Excess: -1.2  /  SaO2: 97.1              Patient was transferred to MICU for further monitoring and possible intubation. MICU Consult and Accept Note     83 yo M with a PMH ESRD on HD MWF, CAD, HTN, DM, COPD (supposed to be on home O2 but pt does not use), diastolic CHF (EF 65%), HLD, anemia of chronic disease, ?"liver problems" and hypothyroidism p/from PCP with SOB x1 day admitted for likely COPD exacerbation in setting of parainfluenza.   Patient was placed in BiPAP for respiratory distress and hypercarbic respiratory acidosis on admission. While on the floor, the patient was agitated and noncompliant with his BiPAP. Labs also showed worsening respiratory acidosis.       Blood Gas Arterial - Lytes,Hgb,iCa,Lact (05.25.17 @ 22:50)    pH, Arterial: 7.19 pH    pCO2, Arterial: 73 mmHg    pO2, Arterial: 110 mmHg    HCO3, Arterial: 22 mmol/L    Oxygen Saturation, Arterial: 97.6 %     Then:     ABG - ( 26 May 2017 00:20 )  pH: 7.17  /  pCO2: 76    /  pO2: 101   / HCO3: 22    / Base Excess: -1.2  /  SaO2: 97.1        PHYSICAL EXAM:    GENERAL: Uncomfortable, moving a lot in bed   HEAD:  Normocephalic, atraumatic  EYES: EOMI, PERRLA  HEENT: Moist mucous membranes  NECK: Supple  NERVOUS SYSTEM:  Moving all extremities,   CHEST/LUNG: Clear to auscultation bilaterally  HEART: Regular rate and rhythm, no murmur   ABDOMEN: Soft, Nontender, Nondistended, Bowel sounds present  EXTREMITIES:   No clubbing, cyanosis, or edema  MUSCULOSKELTAL: No muscle tenderness, no joint tenderness  SKIN:  warm and dry, no rash                 Patient was transferred to MICU for further monitoring and possible intubation. MICU Consult and Accept Note     81 yo M with a PMH ESRD on HD MWF, CAD, HTN, DM, COPD (supposed to be on home O2 but pt does not use), diastolic CHF (EF 65%), HLD, anemia of chronic disease, ?"liver problems" and hypothyroidism p/from PCP with SOB x1 day admitted for likely COPD exacerbation in setting of parainfluenza.   Patient was placed in BiPAP for respiratory distress and hypercarbic respiratory failure on admission. While on the floor, the patient was agitated and noncompliant with his BiPAP. Labs also showed worsening respiratory acidosis.       Blood Gas Arterial - Lytes,Hgb,iCa,Lact (05.25.17 @ 22:50)    pH, Arterial: 7.19 pH    pCO2, Arterial: 73 mmHg    pO2, Arterial: 110 mmHg    HCO3, Arterial: 22 mmol/L    Oxygen Saturation, Arterial: 97.6 %     Then:     ABG - ( 26 May 2017 00:20 )  pH: 7.17  /  pCO2: 76    /  pO2: 101   / HCO3: 22    / Base Excess: -1.2  /  SaO2: 97.1      ROS: unable to obtain 2/2 to [facility]      PHYSICAL EXAM:    GENERAL: Uncomfortable, moving a lot in bed   HEAD:  Normocephalic, atraumatic  EYES: EOMI, PERRLA  HEENT: Moist mucous membranes  NECK: Supple  NERVOUS SYSTEM:  Moving all extremities,   CHEST/LUNG: Clear to auscultation bilaterally, decreased breath sounds bilaterally   HEART: Regular rate and rhythm, no murmur   ABDOMEN: Soft, Nontender, Nondistended, Bowel sounds present  EXTREMITIES:   No clubbing, cyanosis, or edema  MUSCULOSKELTAL: No muscle tenderness, no joint tenderness  SKIN:  warm and dry, no rash MICU Consult and Accept Note     81 yo M with a PMH ESRD on HD MWF, CAD, HTN, DM, COPD (supposed to be on home O2 but pt does not use), diastolic CHF (EF 65%), HLD, anemia of chronic disease, ?"liver problems" and hypothyroidism p/from PCP with SOB x1 day admitted for likely COPD exacerbation in setting of parainfluenza.   Patient was placed in BiPAP for respiratory distress and hypercarbic respiratory failure on admission. While on the floor, the patient was agitated and noncompliant with his BiPAP. Labs also showed worsening respiratory acidosis.       Blood Gas Arterial - Lytes,Hgb,iCa,Lact (05.25.17 @ 22:50)    pH, Arterial: 7.19 pH    pCO2, Arterial: 73 mmHg    pO2, Arterial: 110 mmHg    HCO3, Arterial: 22 mmol/L    Oxygen Saturation, Arterial: 97.6 %     Then:     ABG - ( 26 May 2017 00:20 )  pH: 7.17  /  pCO2: 76    /  pO2: 101   / HCO3: 22    / Base Excess: -1.2  /  SaO2: 97.1      ROS: unable to obtain 2/2 to [facility]      PHYSICAL EXAM:    GENERAL: Uncomfortable, moving a lot in bed   HEAD:  Normocephalic, atraumatic  EYES: EOMI, PERRLA  HEENT: Moist mucous membranes  NECK: Supple  NERVOUS SYSTEM:  Moving all extremities,   CHEST/LUNG: Clear to auscultation bilaterally, decreased breath sounds bilaterally   HEART: Regular rate and rhythm, no murmur   ABDOMEN: Soft, Nontender, Nondistended, Bowel sounds present  EXTREMITIES:   No clubbing, cyanosis, or edema  MUSCULOSKELTAL: No muscle tenderness, no joint tenderness  SKIN:  warm and dry, no rash       Patient was started on precedex calm patient and keep on BiPAP. ABG showed persistent respiratory acidosis, so the patient was sedated and intubated. MICU Consult and Accept Note     81 yo M with a PMH ESRD on HD MWF, CAD, HTN, DM, COPD (supposed to be on home O2 but pt does not use), diastolic CHF (EF 65%), HLD, anemia of chronic disease, ?"liver problems" and hypothyroidism p/from PCP with SOB x1 day admitted for likely COPD exacerbation in setting of parainfluenza.   Patient was placed in BiPAP for respiratory distress and hypercarbic respiratory failure on admission. While on the floor, the patient was agitated and noncompliant with his BiPAP. Labs also showed worsening respiratory acidosis.       Blood Gas Arterial - Lytes,Hgb,iCa,Lact (05.25.17 @ 22:50)    pH, Arterial: 7.19 pH    pCO2, Arterial: 73 mmHg    pO2, Arterial: 110 mmHg    HCO3, Arterial: 22 mmol/L    Oxygen Saturation, Arterial: 97.6 %     Then:     ABG - ( 26 May 2017 00:20 )  pH: 7.17  /  pCO2: 76    /  pO2: 101   / HCO3: 22    / Base Excess: -1.2  /  SaO2: 97.1      ROS: unable to obtain 2/2 to [facility]      ICU Vital Signs Last 24 Hrs  T(C): 36.5, Max: 38.6 (05-25 @ 13:23)  T(F): 97.7, Max: 101.4 (05-25 @ 13:23)  HR: 70 (70 - 106)  BP: 167/67 (93/51 - 168/84)  BP(mean): 89 (61 - 97)  ABP: --  ABP(mean): --  RR: 26 (18 - 33)  SpO2: 99% (93% - 100%)        PHYSICAL EXAM:    GENERAL: sedated and intubated   HEAD:  Normocephalic, atraumatic  EYES: EOMI, PERRLA  HEENT: dry mucous membranes  NECK: Supple  NERVOUS SYSTEM:  Moving all extremities,   CHEST/LUNG: Clear to auscultation bilaterally, decreased breath sounds bilaterally, ET tube in place   HEART: Regular rate and rhythm, no murmur   ABDOMEN: Soft, Nontender, Nondistended, Bowel sounds present  EXTREMITIES:   No clubbing, cyanosis, or edema  MUSCULOSKELTAL: No muscle tenderness, no joint tenderness  SKIN:  warm and dry, no rash       Patient was started on precedex calm patient and keep on BiPAP. ABG showed persistent respiratory acidosis, so the patient was sedated and intubated.

## 2017-05-26 NOTE — CONSULT NOTE ADULT - ASSESSMENT
83 yo M with a PMH ESRD on HD MWF, CAD, HTN, DM, COPD (supposed to be on home O2 but pt does not use) (also likely HANY), diastolic CHF (EF 65%), HLD, anemia of chronic disease, ?"liver problems" and hypothyroidism admitted for COPD exacerbation in setting of parainfluenza virus. Transferred to MICU for hypercarbic respiratory failure.     #Neuro-  #CV-  #Pulm-  #GI-  #Renal-  #ID-  #Heme-  #Endocrine-  #PPx-  #Dispo- 81 yo M with a PMH ESRD on HD MWF, CAD, HTN, DM, COPD (supposed to be on home O2 but pt does not use) (also likely HANY), diastolic CHF (EF 65%), HLD, anemia of chronic disease, ?"liver problems" and hypothyroidism admitted for COPD exacerbation in setting of parainfluenza virus. Transferred to MICU for hypercarbic respiratory failure and then intubated for worsening respiratory acidosis on BiPAP    #Neuro- sedated on propofol   #CV- HFpEF, no signs of volume overload   #Pulm-  #GI-  #Renal-  #ID-  #Heme-  #Endocrine-  #PPx-  #Dispo- 83 yo M with a PMH ESRD on HD MWF, CAD, HTN, DM, COPD (supposed to be on home O2 but pt does not use) (also likely HANY), diastolic CHF (EF 65%), HLD, anemia of chronic disease, ?"liver problems" and hypothyroidism admitted for COPD exacerbation in setting of parainfluenza virus. Transferred to MICU for hypercarbic respiratory failure and then intubated for worsening respiratory acidosis on BiPAP    #Neuro- sedated on propofol   #CV- HFpEF, no signs of volume overload   #Pulm- respiratory acidosis requiring intubation, 2/2 COPD exacerbation 2/2 parainfluenza, on solumedrol 20mg Q8hrs   #GI-   #Renal-  #ID-  #Heme-  #Endocrine-  #PPx-  #Dispo- 81 yo M with a PMH ESRD on HD MWF, CAD, HTN, DM, COPD (supposed to be on home O2 but pt does not use) (also likely HANY), diastolic CHF (EF 65%), HLD, anemia of chronic disease, ?"liver problems" and hypothyroidism admitted for COPD exacerbation in setting of parainfluenza virus. Transferred to MICU for hypercarbic respiratory failure and then intubated for worsening respiratory acidosis on BiPAP    #Neuro- sedated on propofol   #CV- HFpEF, no signs of volume overload, c/w ASA, holding BP meds Coreg, amlodipine and Imdur, currently on Levo to maintain while sedated   #Pulm- respiratory acidosis requiring intubation, 2/2 COPD exacerbation 2/2 parainfluenza, on solumedrol 20mg Q8hrs   #GI- OG tube in place, c/w home protonix   #Renal- ESRD on HD, due for HD today   #ID- c/w azithromycin and ceftriaxone for COPD exacerbation, can d/c azithro if legionella neg   #Endocrine- DM2 on FS Q6 while NPO and ISS, f/u A1c   #PPx- HSQ 81 yo M with a PMH ESRD on HD MWF, CAD, HTN, DM, COPD (supposed to be on home O2 but pt does not use) (also likely HANY), diastolic CHF (EF 65%), HLD, anemia of chronic disease, ?"liver problems" and hypothyroidism admitted for COPD exacerbation in setting of parainfluenza virus. Transferred to MICU for hypercarbic respiratory failure and then intubated for worsening respiratory acidosis on BiPAP    #Neuro- sedated on propofol   #CV- HFpEF, no signs of volume overload, c/w ASA, holding BP meds Coreg, amlodipine and Imdur, currently on Levo to maintain while sedated   #Pulm- respiratory acidosis requiring intubation, 2/2 COPD exacerbation 2/2 parainfluenza, on solumedrol 20mg Q8hrs, albuterol and ipratropium Q6hrs   #GI- OG tube in place, c/w home protonix   #Renal- ESRD on HD, due for HD today   #ID- parainfluenza +, c/w azithromycin and ceftriaxone for COPD exacerbation, can d/c azithro if legionella neg   #Endocrine- DM2 on FS Q6 while NPO and ISS, f/u A1c   #PPx- HSQ 83 yo M with a PMH ESRD on HD MWF, CAD, HTN, DM, COPD (supposed to be on home O2 but pt does not use) (also likely HANY), diastolic CHF (EF 65%), HLD, anemia of chronic disease, ?"liver problems" and hypothyroidism admitted for COPD exacerbation in setting of parainfluenza virus. Transferred to MICU for hypercarbic respiratory failure and then intubated for worsening respiratory acidosis on BiPAP    #Neuro- sedated on propofol   #CV- HFpEF, no signs of volume overload, c/w ASA, holding BP meds Coreg, amlodipine and Imdur, currently on Levo to maintain while sedated   #Pulm- respiratory acidosis requiring intubation, 2/2 COPD exacerbation 2/2 parainfluenza, on solumedrol 20mg Q8hrs, albuterol and ipratropium Q6hrs   #GI- OG tube in place, c/w home protonix   #Renal- ESRD on HD, due for HD today   #ID- parainfluenza +, c/w azithromycin and ceftriaxone for COPD exacerbation, can d/c azithro if legionella neg   #Endocrine- DM2 on FS Q6 while NPO and ISS, f/u A1c, holding Lantus 5u basal insulin while NPO   #PPx- HSQ 83 yo M with a PMH ESRD on HD MWF, CAD, HTN, DM, COPD (supposed to be on home O2 but pt does not use) (also likely HANY), diastolic CHF (EF 65%), HLD, anemia of chronic disease, ?"liver problems" and hypothyroidism admitted for COPD exacerbation in setting of parainfluenza virus. Transferred to MICU for hypercarbic respiratory failure and then intubated for worsening respiratory acidosis on BiPAP    #Neuro- sedated on propofol   #CV- HFpEF, no signs of volume overload, c/w ASA, holding BP meds Coreg, amlodipine and Imdur, currently on Levo to maintain while sedated   #Pulm- respiratory acidosis requiring intubation, 2/2 COPD exacerbation 2/2 parainfluenza, on solumedrol 20mg Q8hrs, albuterol and ipratropium Q6hrs   #GI- OG tube in place, c/w home protonix   #Renal- ESRD on HD, due for HD today   #ID- parainfluenza +, sent bcx, c/w azithromycin and ceftriaxone for COPD exacerbation, can d/c azithro if legionella neg   #Endocrine- DM2 on FS Q6 while NPO and ISS, f/u A1c, holding Lantus 5u basal insulin while NPO   #PPx- HSQ

## 2017-05-27 NOTE — PROGRESS NOTE ADULT - SUBJECTIVE AND OBJECTIVE BOX
: Dr Alvarenga    INDICATION: Hypercapnic Resp Failure    PROCEDURE:  [x] LIMITED ECHO  [x] LIMITED CHEST  [ ] LIMITED RETROPERITONEAL  [ ] LIMITED ABDOMINAL  [ ] LIMITED DVT  [ ] NEEDLE GUIDANCE VASCULAR  [ ] NEEDLE GUIDANCE THORACENTESIS  [ ] NEEDLE GUIDANCE PARACENTESIS  [ ] NEEDLE GUIDANCE PERICARDIOCENTESIS  [ ] OTHER    FINDINGS:  a line predominant b/l  no focal consolidation  normal LV to RV size ratio  normal LV systolic function    INTERPRETATION: : Dr Alvarenga    INDICATION: Hypercapnic Resp Failure, hypotension    PROCEDURE:  [x] LIMITED ECHO  [x] LIMITED CHEST  [ ] LIMITED RETROPERITONEAL  [ ] LIMITED ABDOMINAL  [ ] LIMITED DVT  [ ] NEEDLE GUIDANCE VASCULAR  [ ] NEEDLE GUIDANCE THORACENTESIS  [ ] NEEDLE GUIDANCE PARACENTESIS  [ ] NEEDLE GUIDANCE PERICARDIOCENTESIS  [ ] OTHER    FINDINGS:  a line predominant b/l  no focal consolidation  normal LV to RV size ratio  normal LV systolic function  plump IVC    INTERPRETATION:   - lung exam consistent with COPD exacerbation  - echocardiogram consistent with vasoplegic shock in setting of propofol : Dr Alvarenga    INDICATION: Hypercapnic Resp Failure, hypotension    PROCEDURE:  [x] LIMITED ECHO  [x] LIMITED CHEST  [ ] LIMITED RETROPERITONEAL  [ ] LIMITED ABDOMINAL  [ ] LIMITED DVT  [ ] NEEDLE GUIDANCE VASCULAR  [ ] NEEDLE GUIDANCE THORACENTESIS  [ ] NEEDLE GUIDANCE PARACENTESIS  [ ] NEEDLE GUIDANCE PERICARDIOCENTESIS  [ ] OTHER    FINDINGS:  a line predominant b/l  no focal consolidation  normal LV to RV size ratio  normal LV systolic function  Distended IVC    INTERPRETATION:   - lung exam consistent with COPD exacerbation  - echocardiogram consistent with vasoplegic shock in setting of propofol    I agree with above

## 2017-05-27 NOTE — PROGRESS NOTE ADULT - PROBLEM SELECTOR PLAN 1
S/P HD yesterday.  Subsequent HD on Monday S/P HD yesterday; 1.4kg removed; tolerated well.  Subsequent HD on Monday

## 2017-05-27 NOTE — PROGRESS NOTE ADULT - ASSESSMENT
· Assessment		  81 yo M with a PMH ESRD on HD MWF, CAD, HTN, DM, COPD (supposed to be on home O2 but pt does not use) (also likely HANY), diastolic CHF (EF 65%), HLD, anemia of chronic disease, ?"liver problems" and hypothyroidism admitted for COPD exacerbation in setting of parainfluenza virus. Transferred to MICU for hypercarbic respiratory failure and then intubated for worsening respiratory acidosis on BiPAP    #Neuro- sedated on propofol   #CV- HFpEF, no signs of volume overload, c/w ASA, holding BP meds Coreg, amlodipine and Imdur, currently on Levo to maintain while sedated   #Pulm- respiratory acidosis requiring intubation, 2/2 COPD exacerbation 2/2 parainfluenza, on solumedrol 20mg Q8hrs, albuterol and ipratropium Q6hrs   #GI- OG tube in place, c/w home protonix   #Renal- ESRD on HD, due for HD today   #ID- parainfluenza +, sent bcx, c/w azithromycin and ceftriaxone for COPD exacerbation, can d/c azithro if legionella neg   #Endocrine- DM2 on FS Q6 while NPO and ISS, f/u A1c, holding Lantus 5u basal insulin while NPO   #PPx- HSQ    Problem/Recommendation - 1:  Problem: Parainfluenza infection. Recommendation: parainfluenza indued COPD exacerbation: Patient is already on home oxygen  cont current supportive care.    Problem/Recommendation - 2:  ·  Problem: Acute respiratory failure with hypoxia and hypercapnia.  Recommendation: cont full vent support: wean as tolerated:   on antibiotics and steroids.     Problem/Recommendation - 3:  ·  Problem: COPD exacerbation.  Recommendation: as above.     Problem/Recommendation - 4:  ·  Problem: ESRD on hemodialysis.  Recommendation: on hemodialysis: getting HD now.     Problem/Recommendation - 5:  ·  Problem: HTN (hypertension).  Recommendation: on vasopressors at tis time with sedation with propofol.     Problem/Recommendation - 6:  Problem: DM (diabetes mellitus). Recommendation: per MICU team. · Assessment		  83 yo M with ESRD, CAD, HTN, DM, COPD, HANY, diastolic CHF, HLD, anemia, hypothyroidism s/p hypercapnic respiratory failure    Problem/Recommendation - 1:  Problem:     Problem/Recommendation - 2:  ·  Problem: Acute hypercapnic respiratory failure .  Recommendation: Cont on full vent support. Management per MICU team    Problem/Recommendation - 3:  ·  Problem: COPD exacerbation.  Recommendation: On ABTs and steroid     Problem/Recommendation - 4:  ·  Problem: ESRD on hemodialysis.  Recommendation: management per Renal    Problem/Recommendation - 5:  ·  Problem: HTN (hypertension).  Recommendation: on hold for hypotension. Pt is on pressure support    Problem/Recommendation - 6:  Problem: DM (diabetes mellitus). Recommendation: per MICU team.

## 2017-05-27 NOTE — PROGRESS NOTE ADULT - SUBJECTIVE AND OBJECTIVE BOX
Patient is sedated, paralyzed. on ventilator support      MEDICATIONS  (STANDING):  atorvastatin 40milliGRAM(s) Oral at bedtime  senna 2Tablet(s) Oral at bedtime  montelukast 10milliGRAM(s) Oral daily  ascorbic acid 500milliGRAM(s) Oral daily  levothyroxine 75MICROGram(s) Oral daily  calcium acetate 667milliGRAM(s) Oral three times a day with meals  multivitamin 1Tablet(s) Oral daily  azithromycin  IVPB  IV Intermittent   azithromycin  IVPB 500milliGRAM(s) IV Intermittent every 24 hours  propofol Infusion 50MICROgram(s)/kG/Min IV Continuous <Continuous>  norepinephrine Infusion 0.03MICROgram(s)/kG/Min IV Continuous <Continuous>  heparin  Injectable 5000Unit(s) SubCutaneous every 8 hours  aspirin  chewable 81milliGRAM(s) Oral daily  pantoprazole   Suspension 40milliGRAM(s) Oral daily  ipratropium 17 MICROgram(s) HFA Inhaler 1Puff(s) Inhalation every 6 hours  insulin lispro (HumaLOG) corrective regimen sliding scale  SubCutaneous every 6 hours  dextrose 5%. 1000milliLiter(s) IV Continuous <Continuous>  dextrose 50% Injectable 12.5Gram(s) IV Push once  dextrose 50% Injectable 25Gram(s) IV Push once  dextrose 50% Injectable 25Gram(s) IV Push once  fentaNYL   Infusion 2MICROgram(s)/kG/Hr IV Continuous <Continuous>  chlorhexidine 4% Liquid 1Application(s) Topical daily  ALBUTerol    90 MICROgram(s) HFA Inhaler 2Puff(s) Inhalation every 6 hours  predniSONE Concentrate 40milliGRAM(s) Oral daily  sodium chloride 3%  Inhalation 3milliLiter(s) Inhalation every 6 hours  cisatracurium Infusion 2MICROgram(s)/kG/Min IV Continuous <Continuous>    MEDICATIONS  (PRN):  docusate sodium 100milliGRAM(s) Oral three times a day PRN Constipation  acetaminophen   Tablet 650milliGRAM(s) Oral every 6 hours PRN For Temp greater than 38 C (100.4 F)  dextrose Gel 1Dose(s) Oral once PRN Blood Glucose LESS THAN 70 milliGRAM(s)/deciliter  glucagon  Injectable 1milliGRAM(s) IntraMuscular once PRN Glucose LESS THAN 70 milligrams/deciliter    Vital Signs Last 24 Hrs  T(C): 37, Max: 37 (05-27 @ 08:00)  T(F): 98.6, Max: 98.6 (05-27 @ 08:00)  HR: 72 (63 - 83)  BP: 96/47 (63/46 - 229/61)  BP(mean): 57 (49 - 116)  RR: 18 (11 - 21)  SpO2: 95% (83% - 100%)  Mode: AC/ CMV (Assist Control/ Continuous Mandatory Ventilation)  RR (machine): 12  TV (machine): 450  FiO2: 50  PEEP: 8  MAP: 12  PIP: 41    I&O's Summary  I & Os for 24h ending 27 May 2017 07:00  =============================================  IN: 1130 ml / OUT: 1600 ml / NET: -470 ml    I & Os for current day (as of 27 May 2017 17:48)  =============================================  IN: 767 ml / OUT: 140 ml / NET: 627 ml      Physical Exam:   Const: Sedated, parlyzed  Respiratory: CTA bilat diminished bilat  CVS: S1, S2 no murmur  GI: abd soft non tender  CNS deferred  SKIN intact  : hameed  Ext; no edema    Labs:  ABG - ( 27 May 2017 15:00 )  pH: 7.18  /  pCO2: 73    /  pO2: 84    / HCO3: 22    / Base Excess: -1.1  /  SaO2: 95.6                 11.2   7.70  )-----------( 89       ( 27 May 2017 02:30 )             34.4     05-27    136  |  95<L>  |  44<H>  ----------------------------<  164<H>  4.9   |  25  |  3.54<H>    Ca    7.7<L>      27 May 2017 02:30  Phos  6.4     05-27  Mg     2.2     05-27    TPro  5.8<L>  /  Alb  3.4  /  TBili  0.2  /  DBili  x   /  AST  18  /  ALT  6   /  AlkPhos  80  05-27    CAPILLARY BLOOD GLUCOSE  314 (27 May 2017 12:00)  158 (27 May 2017 00:00)    LIVER FUNCTIONS - ( 27 May 2017 02:30 )  Alb: 3.4 g/dL / Pro: 5.8 g/dL / ALK PHOS: 80 u/L / ALT: 6 u/L / AST: 18 u/L / GGT: x               D DImer  Cultures:         Wound culture:                05-25 @ 14:54  Organism --  Culture w/ gram stain --  Specimen Source BLOOD VENOUS    Wound culture:                05-25 @ 13:48  Organism --  Culture w/ gram stain --  Specimen Source BLOOD PERIPHERAL        Abscess culture:             05-25 @ 14:54  Organism --  Gram Stain --  Specimen Source BLOOD VENOUS    Abscess culture:             05-25 @ 13:48  Organism --  Gram Stain --  Specimen Source BLOOD PERIPHERAL      Tissue culture:           05-25 @ 14:54  Organism --  Gram Stain --  Specimen Source BLOOD VENOUS    Tissue culture:           05-25 @ 13:48  Organism --  Gram Stain --  Specimen Source BLOOD PERIPHERAL        Body Fluid Smear & Culture:                        05-25 @ 14:54  AFB Smear  --  Culture Acid Fast Body Fluid w/ Smear  --  Culture Acid Fast Smear Concentrated   --    Culture Results:     --  Specimen Source BLOOD VENOUS    Body Fluid Smear & Culture:                        05-25 @ 13:48  AFB Smear  --  Culture Acid Fast Body Fluid w/ Smear  --  Culture Acid Fast Smear Concentrated   --    Culture Results:     --  Specimen Source BLOOD PERIPHERAL      Rapid Respiratory Viral Panel Result        05-25 @ 17:00  Rapid RVP --  Coronovirus NOT DETECTED  Adenovirus NOT DETECTED  Bordetella Pertussis NOT DETECTED  Chlamydia Pneumonia NOT DETECTED  Entero/RhinovirusNOT DETECTED  HKU1 Coronovirus NOT DETECTED  HMPV Coronovirus NOT DETECTED  Influenza A NOT DETECTED (any subtype)  Influenza AH1 NOT DETECTED  Influenza AH1 2009 NOT DETECTED  Influenza AH3 NOT DETECTED  Influenza B NOT DETECTED  Mycoplasma Pneumoniae NOT DETECTED This nucleic acid amplification assay was performed using  the [employer]. The following pathogens are tested  for: Adenovirus, Coronavirus 229E, Coronavirus HKU1,  Coronavirus NL63, Coronavirus OC43, Human Metapneumovirus  (HMPV),  NL63 Coronovirus NOT DETECTED  OC43 Coronovirus NOT DETECTED  Parainfluenza 1 NOT DETECTED  Parainfluenza 2 NOT DETECTED  Parainfluenza 3 POSITIVE  Parainfluenza 4 NOT DETECTED  Resp Syncytial Virus NOT DETECTED    EXAM:  RAD CHEST PORTABLE IMMEDIATE        PROCEDURE DATE:  May 27 2017 Visualized lungs are clear.

## 2017-05-27 NOTE — PROGRESS NOTE ADULT - ASSESSMENT
83 yo M with ESRD, CAD, HTN, DM, COPD, HANY, diastolic CHF, HLD, anemia, hypothyroidism s/p hypercapnic respiratory failure    Problem/Recommendation - 1:  Problem:     Problem/Recommendation - 2:  ·  Problem: Acute hypercapnic respiratory failure .  Recommendation: Cont on full vent support. Management per MICU team    Problem/Recommendation - 3:  ·  Problem: COPD exacerbation.  Recommendation: On ABTs and steroid     Problem/Recommendation - 4:  ·  Problem: ESRD on hemodialysis.  Recommendation: management per Renal    Problem/Recommendation - 5:  ·  Problem: HTN (hypertension).  Recommendation: on hold for hypotension. Pt is on pressure support    Problem/Recommendation - 6:  Problem: DM (diabetes mellitus). Recommendation: per MICU team.

## 2017-05-27 NOTE — CHART NOTE - NSCHARTNOTEFT_GEN_A_CORE
Event Note:    Peripheral IV administering Levophed infiltrated. No signs of hematoma or vascular compromise. Terbutaline administered as per ICU protocol in a clockwise fashion, and nitropaste topically applied. Patient will have to follow up as an outpatient in 1 month.

## 2017-05-27 NOTE — PROGRESS NOTE ADULT - SUBJECTIVE AND OBJECTIVE BOX
Patient seen and examined  Patient seen and examined @ bedside.  On HD at present.  Intubated/sedated.  On pressors    REVIEW OF SYSTEMS:  As per HPI, otherwise 8 full 10 ROS were unremarkable    MEDICATIONS  (STANDING):  atorvastatin 40milliGRAM(s) Oral at bedtime  senna 2Tablet(s) Oral at bedtime  montelukast 10milliGRAM(s) Oral daily  ascorbic acid 500milliGRAM(s) Oral daily  levothyroxine 75MICROGram(s) Oral daily  calcium acetate 667milliGRAM(s) Oral three times a day with meals  multivitamin 1Tablet(s) Oral daily  azithromycin  IVPB  IV Intermittent   azithromycin  IVPB 500milliGRAM(s) IV Intermittent every 24 hours  propofol Infusion 50MICROgram(s)/kG/Min IV Continuous <Continuous>  norepinephrine Infusion 0.03MICROgram(s)/kG/Min IV Continuous <Continuous>  heparin  Injectable 5000Unit(s) SubCutaneous every 8 hours  aspirin  chewable 81milliGRAM(s) Oral daily  pantoprazole   Suspension 40milliGRAM(s) Oral daily  ipratropium 17 MICROgram(s) HFA Inhaler 1Puff(s) Inhalation every 6 hours  insulin lispro (HumaLOG) corrective regimen sliding scale  SubCutaneous every 6 hours  dextrose 5%. 1000milliLiter(s) IV Continuous <Continuous>  dextrose 50% Injectable 12.5Gram(s) IV Push once  dextrose 50% Injectable 25Gram(s) IV Push once  dextrose 50% Injectable 25Gram(s) IV Push once  fentaNYL   Infusion 2MICROgram(s)/kG/Hr IV Continuous <Continuous>  chlorhexidine 4% Liquid 1Application(s) Topical daily  ALBUTerol    90 MICROgram(s) HFA Inhaler 2Puff(s) Inhalation every 6 hours  predniSONE Concentrate 40milliGRAM(s) Oral daily  sodium chloride 3%  Inhalation 3milliLiter(s) Inhalation every 6 hours  cisatracurium Infusion 2MICROgram(s)/kG/Min IV Continuous <Continuous>  terbutaline  Injectable 1milliGRAM(s) SubCutaneous once  nitroglycerin    2% Ointment 1Inch(s) Transdermal once      VITAL:  T(C): , Max: 37 (05-27 @ 08:00)  T(F): , Max: 98.6 (05-27 @ 08:00)  HR: 73  BP: 109/43  BP(mean): 57  RR: 21  SpO2: 93%  Wt(kg): --    I and O's:  I & Os for 24h ending  @ 07:00  =============================================  IN: 1130 ml / OUT: 1600 ml / NET: -470 ml    I & Os for current day (as of  @ 16:27)  =============================================  IN: 547 ml / OUT: 80 ml / NET: 467 ml        PHYSICAL EXAM:    Constitutional: NAD  HEENT: PERRLA, EOMI,  MMM  Neck: No LAD, No JVD  Respiratory: CTAB  Cardiovascular: S1 and S2  Gastrointestinal: BS+, soft, NT/ND  Extremities: No peripheral edema  Neurological: A/O x 3, no focal deficits  Psychiatric: Normal mood, normal affect  : No Gar  Skin: No rashes  Access: Left radial fistual +thrill    LABS:                        11.2   7.70  )-----------( 89       ( 27 May 2017 02:30 )             34.4         136  |  95<L>  |  44<H>  ----------------------------<  164<H>  4.9   |  25  |  3.54<H>    Ca    7.7<L>      27 May 2017 02:30  Phos  6.4       Mg     2.2         TPro  5.8<L>  /  Alb  3.4  /  TBili  0.2  /  DBili  x   /  AST  18  /  ALT  6   /  AlkPhos  80        Urine Studies:  Urinalysis Basic - ( 25 May 2017 17:00 )    Color: YELLOW / Appearance: CLEAR / S.024 / pH: 6.0  Gluc: NEGATIVE / Ketone: NEGATIVE  / Bili: NEGATIVE / Urobili: 2 E.U.   Blood: NEGATIVE / Protein: 300 / Nitrite: NEGATIVE   Leuk Esterase: SMALL / RBC: 0-2 / WBC 2-5   Sq Epi: x / Non Sq Epi: FEW / Bacteria: SMALL Patient seen and examined in MICU intubated & sedated.  Patient remains on pressors.    REVIEW OF SYSTEMS:  UTO-intubated    MEDICATIONS  (STANDING):  atorvastatin 40milliGRAM(s) Oral at bedtime  senna 2Tablet(s) Oral at bedtime  montelukast 10milliGRAM(s) Oral daily  ascorbic acid 500milliGRAM(s) Oral daily  levothyroxine 75MICROGram(s) Oral daily  calcium acetate 667milliGRAM(s) Oral three times a day with meals  multivitamin 1Tablet(s) Oral daily  azithromycin  IVPB  IV Intermittent   azithromycin  IVPB 500milliGRAM(s) IV Intermittent every 24 hours  propofol Infusion 50MICROgram(s)/kG/Min IV Continuous <Continuous>  norepinephrine Infusion 0.03MICROgram(s)/kG/Min IV Continuous <Continuous>  heparin  Injectable 5000Unit(s) SubCutaneous every 8 hours  aspirin  chewable 81milliGRAM(s) Oral daily  pantoprazole   Suspension 40milliGRAM(s) Oral daily  ipratropium 17 MICROgram(s) HFA Inhaler 1Puff(s) Inhalation every 6 hours  insulin lispro (HumaLOG) corrective regimen sliding scale  SubCutaneous every 6 hours  dextrose 5%. 1000milliLiter(s) IV Continuous <Continuous>  dextrose 50% Injectable 12.5Gram(s) IV Push once  dextrose 50% Injectable 25Gram(s) IV Push once  dextrose 50% Injectable 25Gram(s) IV Push once  fentaNYL   Infusion 2MICROgram(s)/kG/Hr IV Continuous <Continuous>  chlorhexidine 4% Liquid 1Application(s) Topical daily  ALBUTerol    90 MICROgram(s) HFA Inhaler 2Puff(s) Inhalation every 6 hours  predniSONE Concentrate 40milliGRAM(s) Oral daily  sodium chloride 3%  Inhalation 3milliLiter(s) Inhalation every 6 hours  cisatracurium Infusion 2MICROgram(s)/kG/Min IV Continuous <Continuous>  terbutaline  Injectable 1milliGRAM(s) SubCutaneous once  nitroglycerin    2% Ointment 1Inch(s) Transdermal once      VITAL:  T(C): , Max: 37 (05-27 @ 08:00)  T(F): , Max: 98.6 (05-27 @ 08:00)  HR: 73  BP: 109/43  BP(mean): 57  RR: 21  SpO2: 93%  Wt(kg): --    I and O's:  I & Os for 24h ending  @ 07:00  =============================================  IN: 1130 ml / OUT: 1600 ml / NET: -470 ml    I & Os for current day (as of  @ 16:27)  =============================================  IN: 547 ml / OUT: 80 ml / NET: 467 ml        PHYSICAL EXAM:    Constitutional: NAD  HEENT: +intubated  Neck: No LAD, No JVD  Respiratory: CTAB  Cardiovascular: S1 and S2  Gastrointestinal: BS+, soft, NT/ND  Extremities: No peripheral edema  Neurological: sedated  : + Gar  Skin: No rashes  Access: Left radial fistual +thrill    LABS:                        11.2   7.70  )-----------( 89       ( 27 May 2017 02:30 )             34.4         136  |  95<L>  |  44<H>  ----------------------------<  164<H>  4.9   |  25  |  3.54<H>    Ca    7.7<L>      27 May 2017 02:30  Phos  6.4       Mg     2.2         TPro  5.8<L>  /  Alb  3.4  /  TBili  0.2  /  DBili  x   /  AST  18  /  ALT  6   /  AlkPhos  80        Urine Studies:  Urinalysis Basic - ( 25 May 2017 17:00 )    Color: YELLOW / Appearance: CLEAR / S.024 / pH: 6.0  Gluc: NEGATIVE / Ketone: NEGATIVE  / Bili: NEGATIVE / Urobili: 2 E.U.   Blood: NEGATIVE / Protein: 300 / Nitrite: NEGATIVE   Leuk Esterase: SMALL / RBC: 0-2 / WBC 2-5   Sq Epi: x / Non Sq Epi: FEW / Bacteria: SMALL    Family updated at bedside

## 2017-05-27 NOTE — PROGRESS NOTE ADULT - ASSESSMENT
81 yo M with a PMH ESRD on HD MWF, CAD, HTN, DM, COPD (supposed to be on home O2 but pt does not use) (also likely HANY), diastolic CHF (EF 65%), HLD, anemia of chronic disease, ?"liver problems" and hypothyroidism admitted for COPD exacerbation in setting of parainfluenza virus. Transferred to MICU for hypercarbic respiratory failure and then intubated for worsening respiratory acidosis on BiPAP.  #Neuro- sedated on propofol   #CV- HFpEF, no signs of volume overload, c/w ASA, holding BP meds Coreg, amlodipine and Imdur, currently on Levo to maintain while sedated   #Pulm- respiratory acidosis requiring intubation, 2/2 COPD exacerbation 2/2 parainfluenza, on solumedrol 20mg Q8hrs, albuterol and ipratropium Q6hrs   #GI- OG tube in place, c/w home protonix   #Renal- ESRD on HD, HD per nephrology.  #ID- parainfluenza +, sent bcx, c/w azithromycin and ceftriaxone for COPD exacerbation started on 5/25 for total of 7 days.  #Endocrine- DM2 currently on ISS, hgA1c 7.8, continue home levothyroxine.  #PPx- HSQ 81 yo M with a PMH ESRD on HD MWF, CAD, HTN, DM, COPD (supposed to be on home O2 but pt does not use) (also likely HANY), diastolic CHF (EF 65%), HLD, anemia of chronic disease, ?"liver problems" and hypothyroidism admitted for COPD exacerbation in setting of parainfluenza virus. Transferred to MICU for hypercarbic respiratory failure and then intubated for worsening respiratory acidosis on BiPAP.  #Neuro- sedated on propofol   #CV- HFpEF, no signs of volume overload, c/w ASA, holding BP meds Coreg, amlodipine and Imdur, currently on Levo to maintain while sedated   #Pulm- respiratory acidosis requiring intubation, 2/2 COPD exacerbation 2/2 parainfluenza, on prednisone, albuterol and ipratropium Q6hrs, continue with azithromycin and ceftriaxone.  #GI- OG tube in place, c/w home protonix   #Renal- ESRD on HD, HD per nephrology due Monday.  #ID- parainfluenza +, sent bcx, c/w azithromycin and ceftriaxone for COPD exacerbation started on 5/25 for total of 7 days.  #Endocrine- DM2 currently on ISS, hgA1c 7.8, continue home levothyroxine.  #PPx- HSQ

## 2017-05-27 NOTE — PROGRESS NOTE ADULT - PROBLEM SELECTOR PLAN 3
appears euvolemic.  On Levo likely secondary to sedation.  Diastolic heart failure but seems compensated at present. remains euvolemic.  On Levo likely secondary to sedation.  Diastolic heart failure but seems compensated at present.

## 2017-05-27 NOTE — PROGRESS NOTE ADULT - PROBLEM SELECTOR PLAN 2
+intubation.  Remains on prednisone & IV abx.  Vent management per MICU. +intubation.  Presented with respiratory acidosis requiring intubation dt COPD exacerbation & + parainfluenza; remains on prednisone, neb tx & IV abx.  Vent management per MICU.,

## 2017-05-27 NOTE — PROGRESS NOTE ADULT - SUBJECTIVE AND OBJECTIVE BOX
Patient is a 82y old  Male who presents with a chief complaint of sob found to have severe resp failure    INTERVAL HPI/OVERNIGHT EVENTS:  T(C): 36.8, Max: 37 (05-27 @ 08:00)  HR: 71 (63 - 78)  BP: 94/42 (63/46 - 229/61)  RR: 12 (11 - 21)  SpO2: 95% (83% - 100%)  Wt(kg): --  I&O's Summary  I & Os for 24h ending 27 May 2017 07:00  =============================================  IN: 1130 ml / OUT: 1600 ml / NET: -470 ml    I & Os for current day (as of 27 May 2017 20:21)  =============================================  IN: 1160.8 ml / OUT: 140 ml / NET: 1020.8 ml      LABS:                        11.2   7.70  )-----------( 89       ( 27 May 2017 02:30 )             34.4     05-27    136  |  95<L>  |  44<H>  ----------------------------<  164<H>  4.9   |  25  |  3.54<H>    Ca    7.7<L>      27 May 2017 02:30  Phos  6.4     05-27  Mg     2.2     05-27    TPro  5.8<L>  /  Alb  3.4  /  TBili  0.2  /  DBili  x   /  AST  18  /  ALT  6   /  AlkPhos  80  05-27        CAPILLARY BLOOD GLUCOSE  363 (27 May 2017 18:00)  314 (27 May 2017 12:00)  158 (27 May 2017 00:00)    ABG - ( 27 May 2017 15:00 )  pH: 7.18  /  pCO2: 73    /  pO2: 84    / HCO3: 22    / Base Excess: -1.1  /  SaO2: 95.6                    MEDICATIONS  (STANDING):  atorvastatin 40milliGRAM(s) Oral at bedtime  senna 2Tablet(s) Oral at bedtime  montelukast 10milliGRAM(s) Oral daily  ascorbic acid 500milliGRAM(s) Oral daily  levothyroxine 75MICROGram(s) Oral daily  calcium acetate 667milliGRAM(s) Oral three times a day with meals  multivitamin 1Tablet(s) Oral daily  azithromycin  IVPB  IV Intermittent   azithromycin  IVPB 500milliGRAM(s) IV Intermittent every 24 hours  propofol Infusion 50MICROgram(s)/kG/Min IV Continuous <Continuous>  norepinephrine Infusion 0.03MICROgram(s)/kG/Min IV Continuous <Continuous>  heparin  Injectable 5000Unit(s) SubCutaneous every 8 hours  aspirin  chewable 81milliGRAM(s) Oral daily  pantoprazole   Suspension 40milliGRAM(s) Oral daily  ipratropium 17 MICROgram(s) HFA Inhaler 1Puff(s) Inhalation every 6 hours  insulin lispro (HumaLOG) corrective regimen sliding scale  SubCutaneous every 6 hours  dextrose 5%. 1000milliLiter(s) IV Continuous <Continuous>  dextrose 50% Injectable 12.5Gram(s) IV Push once  dextrose 50% Injectable 25Gram(s) IV Push once  dextrose 50% Injectable 25Gram(s) IV Push once  fentaNYL   Infusion 2MICROgram(s)/kG/Hr IV Continuous <Continuous>  chlorhexidine 4% Liquid 1Application(s) Topical daily  ALBUTerol    90 MICROgram(s) HFA Inhaler 2Puff(s) Inhalation every 6 hours  predniSONE Concentrate 40milliGRAM(s) Oral daily  sodium chloride 3%  Inhalation 3milliLiter(s) Inhalation every 6 hours  cisatracurium Infusion 2MICROgram(s)/kG/Min IV Continuous <Continuous>    MEDICATIONS  (PRN):  docusate sodium 100milliGRAM(s) Oral three times a day PRN Constipation  acetaminophen   Tablet 650milliGRAM(s) Oral every 6 hours PRN For Temp greater than 38 C (100.4 F)  dextrose Gel 1Dose(s) Oral once PRN Blood Glucose LESS THAN 70 milliGRAM(s)/deciliter  glucagon  Injectable 1milliGRAM(s) IntraMuscular once PRN Glucose LESS THAN 70 milligrams/deciliter      REVIEW OF SYSTEMS:  CONSTITUTIONAL: No fever, weight loss, or fatigue  EYES: No eye pain, visual disturbances, or discharge  ENMT:  No difficulty hearing, tinnitus, vertigo; No sinus or throat pain  NECK: No pain or stiffness  RESPIRATORY: No cough, wheezing, chills or hemoptysis; No shortness of breath  CARDIOVASCULAR: No chest pain, palpitations, dizziness, or leg swelling  GASTROINTESTINAL: No abdominal or epigastric pain. No nausea, vomiting, or hematemesis; No diarrhea or constipation. No melena or hematochezia.  GENITOURINARY: No dysuria, frequency, hematuria, or incontinence  NEUROLOGICAL: No headaches, memory loss, loss of strength, numbness, or tremors  SKIN: No itching, burning, rashes, or lesions   LYMPH NODES: No enlarged glands  ENDOCRINE: No heat or cold intolerance; No hair loss  MUSCULOSKELETAL: No joint pain or swelling; No muscle, back, or extremity pain  PSYCHIATRIC: No depression, anxiety, mood swings, or difficulty sleeping  HEME/LYMPH: No easy bruising, or bleeding gums  ALLERY AND IMMUNOLOGIC: No hives or eczema    RADIOLOGY & ADDITIONAL TESTS:    Imaging Personally Reviewed:  [ ] YES  [ ] NO    Consultant(s) Notes Reviewed:  [ ] YES  [ ] NO    PHYSICAL EXAM:  GENERAL: NAD, well-groomed, well-developed  HEAD:  Atraumatic, Normocephalic  EYES: EOMI, PERRLA, conjunctiva and sclera clear  ENMT: No tonsillar erythema, exudates, or enlargement; Moist mucous membranes, Good dentition, No lesions  NECK: Supple, No JVD, Normal thyroid  NERVOUS SYSTEM:  Alert & Oriented X3, Good concentration; Motor Strength 5/5 B/L upper and lower extremities; DTRs 2+ intact and symmetric  CHEST/LUNG: Clear to percussion bilaterally; No rales, rhonchi, wheezing, or rubs  HEART: Regular rate and rhythm; No murmurs, rubs, or gallops  ABDOMEN: Soft, Nontender, Nondistended; Bowel sounds present  EXTREMITIES:  2+ Peripheral Pulses, No clubbing, cyanosis, or edema  LYMPH: No lymphadenopathy noted  SKIN: No rashes or lesions    Care Discussed with Consultants/Other Providers [ ] YES  [ ] NO

## 2017-05-27 NOTE — PROGRESS NOTE ADULT - SUBJECTIVE AND OBJECTIVE BOX
CHIEF COMPLAINT: SOB    Interval Events: No events ON    REVIEW OF SYSTEMS:  Constitutional:   Eyes:  ENT:  CV:  Resp:  GI:  :  MSK:  Integumentary:  Neurological:  Psychiatric:  Endocrine:  Hematologic/Lymphatic:  Allergic/Immunologic:  [ ] All other systems negative  [x] Unable to assess ROS because Unable to assess due to intubation    OBJECTIVE:  ICU Vital Signs Last 24 Hrs  T(C): 37, Max: 37 ( @ 08:00)  T(F): 98.6, Max: 98.6 ( @ 08:00)  HR: 67 (66 - 83)  BP: 113/54 (82/39 - 158/76)  BP(mean): 68 (50 - 101)  ABP: --  ABP(mean): --  RR: 18 (17 - 23)  SpO2: 96% (93% - 98%)    Mode: AC/ CMV (Assist Control/ Continuous Mandatory Ventilation), RR (machine): 18, TV (machine): 450, FiO2: 40, PEEP: 5, ITime: 1, MAP: 11.8, PIP: 38  I & Os for 24h ending  @ 07:00  =============================================  IN: 530 ml / OUT: 200 ml / NET: 330 ml    I & Os for current day (as of  @ 07:31)  =============================================  IN: 92.8 ml / OUT: 50 ml / NET: 42.8 ml    CAPILLARY BLOOD GLUCOSE  158 (27 May 2017 00:00)      PHYSICAL EXAM:  General: NAD  HEENT: NCAT, Sclera nonicteric, PERRLA  Lymph Nodes: No LAD  Neck: ET tube inplace. Trachea midline, thyroid non palpable.  Respiratory:  CTAB, no wheezes, rales or rhonchi.   Cardiovascular: Normal S1, S2, no murmurs rubs or gallops  Abdomen: Soft, Nondistended, Nontender. +BS x4.  Extremities: No clubbing, cyanosis or edema.  Skin: Clean, Dry Intact.  Neurological: Intubated  Psychiatry: Intubated    HOSPITAL MEDICATIONS:  MEDICATIONS  (STANDING):  atorvastatin 40milliGRAM(s) Oral at bedtime  senna 2Tablet(s) Oral at bedtime  montelukast 10milliGRAM(s) Oral daily  ascorbic acid 500milliGRAM(s) Oral daily  levothyroxine 75MICROGram(s) Oral daily  calcium acetate 667milliGRAM(s) Oral three times a day with meals  multivitamin 1Tablet(s) Oral daily  azithromycin  IVPB  IV Intermittent   cefTRIAXone   IVPB 1Gram(s) IV Intermittent every 24 hours  azithromycin  IVPB 500milliGRAM(s) IV Intermittent every 24 hours  propofol Infusion 50MICROgram(s)/kG/Min IV Continuous <Continuous>  norepinephrine Infusion 0.03MICROgram(s)/kG/Min IV Continuous <Continuous>  heparin  Injectable 5000Unit(s) SubCutaneous every 8 hours  aspirin  chewable 81milliGRAM(s) Oral daily  pantoprazole   Suspension 40milliGRAM(s) Oral daily  ipratropium 17 MICROgram(s) HFA Inhaler 1Puff(s) Inhalation every 6 hours  insulin lispro (HumaLOG) corrective regimen sliding scale  SubCutaneous every 6 hours  dextrose 5%. 1000milliLiter(s) IV Continuous <Continuous>  dextrose 50% Injectable 12.5Gram(s) IV Push once  dextrose 50% Injectable 25Gram(s) IV Push once  dextrose 50% Injectable 25Gram(s) IV Push once  fentaNYL   Infusion 2MICROgram(s)/kG/Hr IV Continuous <Continuous>  chlorhexidine 4% Liquid 1Application(s) Topical daily  ALBUTerol    90 MICROgram(s) HFA Inhaler 2Puff(s) Inhalation every 6 hours  predniSONE Concentrate 40milliGRAM(s) Oral daily    MEDICATIONS  (PRN):  docusate sodium 100milliGRAM(s) Oral three times a day PRN Constipation  acetaminophen   Tablet 650milliGRAM(s) Oral every 6 hours PRN For Temp greater than 38 C (100.4 F)  dextrose Gel 1Dose(s) Oral once PRN Blood Glucose LESS THAN 70 milliGRAM(s)/deciliter  glucagon  Injectable 1milliGRAM(s) IntraMuscular once PRN Glucose LESS THAN 70 milligrams/deciliter      LABS:                        11.2   7.70  )-----------( 89       ( 27 May 2017 02:30 )             34.4         136  |  95<L>  |  44<H>  ----------------------------<  164<H>  4.9   |  25  |  3.54<H>    Ca    7.7<L>      27 May 2017 02:30  Phos  6.4       Mg     2.2         TPro  5.8<L>  /  Alb  3.4  /  TBili  0.2  /  DBili  x   /  AST  18  /  ALT  6   /  AlkPhos  80        Urinalysis Basic - ( 25 May 2017 17:00 )    Color: YELLOW / Appearance: CLEAR / S.024 / pH: 6.0  Gluc: NEGATIVE / Ketone: NEGATIVE  / Bili: NEGATIVE / Urobili: 2 E.U.   Blood: NEGATIVE / Protein: 300 / Nitrite: NEGATIVE   Leuk Esterase: SMALL / RBC: 0-2 / WBC 2-5   Sq Epi: x / Non Sq Epi: FEW / Bacteria: SMALL      Arterial Blood Gas:   @ 02:30  7.29/62/86/26/96.4/2.8  ABG lactate: --  Arterial Blood Gas:   @ 14:30  7.26/64/85/25/96.3/1.7  ABG lactate: --  Arterial Blood Gas:   @ 12:15  7.22/63/47/21/77.1/-1.8  ABG lactate: 1.4  Arterial Blood Gas:   @ 08:15  7.23/58/137/21/98.6/-3.0  ABG lactate: 1.4  Arterial Blood Gas:   @ 06:17  7.16/78/192/21/97.3/-1.2  ABG lactate: --  Arterial Blood Gas:   @ 00:20  7.17/76/101/22/97.1/-1.2  ABG lactate: 1.5  Arterial Blood Gas:   @ 22:50  7.19/73/110/22/97.6/-0.3  ABG lactate: 1.0    Venous Blood Gas:   @ 19:40  7.14/90/39/22/60.1  VBG Lactate: --  Venous Blood Gas:   @ 13:20  7.21/80/47/25/72.0  VBG Lactate: 1.6      MICROBIOLOGY: Blood cultures  NGTD  Urine legionella pending.    RADIOLOGY:  None    EKG: None    ASSESSMENT AND PLAN:

## 2017-05-28 NOTE — PROGRESS NOTE ADULT - SUBJECTIVE AND OBJECTIVE BOX
CHIEF COMPLAINT: SOB    Interval Events: Levophed IV infiltrated, Terbutaline and nitropaste applied. Respiratory acidosis still present on ABG, Respiratory rate changed. Hyperglycemia noted ON, started on Lantus 5, patient's previous dose.    REVIEW OF SYSTEMS:  Constitutional:   Eyes:  ENT:  CV:  Resp:  GI:  :  MSK:  Integumentary:  Neurological:  Psychiatric:  Endocrine:  Hematologic/Lymphatic:  Allergic/Immunologic:  [ ] All other systems negative  [x] Unable to assess ROS because Unable to assess due to intubation    OBJECTIVE:  ICU Vital Signs Last 24 Hrs  T(C): 36.3, Max: 37 (05-27 @ 08:00)  T(F): 97.3, Max: 98.6 (05-27 @ 08:00)  HR: 65 (63 - 78)  BP: 119/54 (63/46 - 229/61)  BP(mean): 68 (49 - 116)  ABP: --  ABP(mean): --  RR: 13 (11 - 21)  SpO2: 95% (83% - 100%)    Mode: AC/ CMV (Assist Control/ Continuous Mandatory Ventilation), RR (machine): 14, TV (machine): 450, FiO2: 40, PEEP: 8, MAP: 12, PIP: 43    I & Os for current day (as of 05-28 @ 07:30)  =============================================  IN: 2387.8 ml / OUT: 180 ml / NET: 2207.8 ml    CAPILLARY BLOOD GLUCOSE  317 (28 May 2017 06:00)      PHYSICAL EXAM:  General: NAD  HEENT: NCAT, Sclera nonicteric, PERRLA  Lymph Nodes: No LAD  Neck: ET tube inplace. Trachea midline, thyroid non palpable.  Respiratory:  CTAB, no wheezes, rales or rhonchi.   Cardiovascular: Normal S1, S2, no murmurs rubs or gallops  Abdomen: Soft, Nondistended, Nontender. +BS x4.  Extremities: No clubbing, cyanosis or edema.  Skin: Clean, Dry Intact.  Neurological: Intubated  Psychiatry: Intubated    HOSPITAL MEDICATIONS:  MEDICATIONS  (STANDING):  atorvastatin 40milliGRAM(s) Oral at bedtime  montelukast 10milliGRAM(s) Oral daily  ascorbic acid 500milliGRAM(s) Oral daily  levothyroxine 75MICROGram(s) Oral daily  calcium acetate 667milliGRAM(s) Oral three times a day with meals  multivitamin 1Tablet(s) Oral daily  azithromycin  IVPB  IV Intermittent   azithromycin  IVPB 500milliGRAM(s) IV Intermittent every 24 hours  propofol Infusion 50MICROgram(s)/kG/Min IV Continuous <Continuous>  norepinephrine Infusion 0.03MICROgram(s)/kG/Min IV Continuous <Continuous>  heparin  Injectable 5000Unit(s) SubCutaneous every 8 hours  aspirin  chewable 81milliGRAM(s) Oral daily  pantoprazole   Suspension 40milliGRAM(s) Oral daily  ipratropium 17 MICROgram(s) HFA Inhaler 1Puff(s) Inhalation every 6 hours  fentaNYL   Infusion 2MICROgram(s)/kG/Hr IV Continuous <Continuous>  chlorhexidine 4% Liquid 1Application(s) Topical daily  ALBUTerol    90 MICROgram(s) HFA Inhaler 2Puff(s) Inhalation every 6 hours  predniSONE Concentrate 40milliGRAM(s) Oral daily  cisatracurium Infusion 2MICROgram(s)/kG/Min IV Continuous <Continuous>  sodium chloride 3%  Inhalation 3milliLiter(s) Inhalation every 8 hours  insulin glargine Injectable (LANTUS) 5Unit(s) SubCutaneous at bedtime  insulin lispro (HumaLOG) corrective regimen sliding scale  SubCutaneous every 6 hours  dextrose 5%. 1000milliLiter(s) IV Continuous <Continuous>  dextrose 50% Injectable 12.5Gram(s) IV Push once  dextrose 50% Injectable 25Gram(s) IV Push once  dextrose 50% Injectable 25Gram(s) IV Push once    MEDICATIONS  (PRN):  acetaminophen   Tablet 650milliGRAM(s) Oral every 6 hours PRN For Temp greater than 38 C (100.4 F)  dextrose Gel 1Dose(s) Oral once PRN Blood Glucose LESS THAN 70 milliGRAM(s)/deciliter  glucagon  Injectable 1milliGRAM(s) IntraMuscular once PRN Glucose LESS THAN 70 milligrams/deciliter      LABS:                        11.1   8.71  )-----------( 85       ( 28 May 2017 02:53 )             34.2     05-28    128<L>  |  86<L>  |  68<H>  ----------------------------<  384<H>  5.2   |  23  |  4.71<H>    Ca    7.4<L>      28 May 2017 02:53  Phos  7.9     05-28  Mg     2.6     05-28    TPro  5.8<L>  /  Alb  3.3  /  TBili  0.2  /  DBili  x   /  AST  20  /  ALT  12  /  AlkPhos  78  05-28        Arterial Blood Gas: RR 16 05-28 @ 05:40  7.14/79/104/21/96.7/-2.1  ABG lactate: 1.7    Arterial Blood Gas: RR 14 05-28 @ 02:53  7.12/88/90/21/94.8/-1.4  ABG lactate: --      Arterial Blood Gas: RR 12 05-27 @ 15:00  7.18/73/84/22/95.6/-1.1    ABG lactate: --  Arterial Blood Gas:  05-27 @ 12:10  7.17/81/173/23/98.7/0.6  ABG lactate: 1.2  Arterial Blood Gas:  05-27 @ 02:30  7.29/62/86/26/96.4/2.8  ABG lactate: --  Arterial Blood Gas:  05-26 @ 14:30  7.26/64/85/25/96.3/1.7  ABG lactate: --  Arterial Blood Gas:  05-26 @ 12:15  7.22/63/47/21/77.1/-1.8  ABG lactate: 1.4  Arterial Blood Gas:  05-26 @ 08:15  7.23/58/137/21/98.6/-3.0  ABG lactate: 1.4        MICROBIOLOGY: Blood cultures 5/25 NGTD  Urine legionella Negative.    RADIOLOGY:  No evidence of pneumothorax    EKG: None    ASSESSMENT AND PLAN: CHIEF COMPLAINT: SOB    Interval Events: Levophed IV infiltrated, Terbutaline and nitropaste applied. Respiratory acidosis still present on ABG, Respiratory rate changed. Hyperglycemia noted ON, started on Lantus 5, patient's previous dose.    REVIEW OF SYSTEMS:  Constitutional:   Eyes:  ENT:  CV:  Resp:  GI:  :  MSK:  Integumentary:  Neurological:  Psychiatric:  Endocrine:  Hematologic/Lymphatic:  Allergic/Immunologic:  [ ] All other systems negative  [x] Unable to assess ROS because Unable to assess due to intubation    OBJECTIVE:  ICU Vital Signs Last 24 Hrs  T(C): 36.3, Max: 37 (05-27 @ 08:00)  T(F): 97.3, Max: 98.6 (05-27 @ 08:00)  HR: 65 (63 - 78)  BP: 119/54 (63/46 - 229/61)  BP(mean): 68 (49 - 116)  ABP: --  ABP(mean): --  RR: 13 (11 - 21)  SpO2: 95% (83% - 100%)    Mode: AC/ CMV (Assist Control/ Continuous Mandatory Ventilation), RR (machine): 14, TV (machine): 450, FiO2: 40, PEEP: 8, MAP: 12, PIP: 43    I & Os for current day (as of 05-28 @ 07:30)  =============================================  IN: 2387.8 ml / OUT: 180 ml / NET: 2207.8 ml    CAPILLARY BLOOD GLUCOSE  317 (28 May 2017 06:00)      PHYSICAL EXAM:  General: NAD  HEENT: NCAT, Sclera nonicteric, PERRLA  Lymph Nodes: No LAD  Neck: ET tube inplace. Trachea midline, thyroid non palpable.  Respiratory:  CTAB, no wheezes, rales or rhonchi.   Cardiovascular: Normal S1, S2, no murmurs rubs or gallops  Abdomen: Soft, +distension, Nontender. +BS x4.  Extremities: No clubbing, cyanosis or edema.  Skin: Clean, Dry Intact.  Neurological: Intubated  Psychiatry: Intubated    HOSPITAL MEDICATIONS:  MEDICATIONS  (STANDING):  atorvastatin 40milliGRAM(s) Oral at bedtime  montelukast 10milliGRAM(s) Oral daily  ascorbic acid 500milliGRAM(s) Oral daily  levothyroxine 75MICROGram(s) Oral daily  calcium acetate 667milliGRAM(s) Oral three times a day with meals  multivitamin 1Tablet(s) Oral daily  azithromycin  IVPB  IV Intermittent   azithromycin  IVPB 500milliGRAM(s) IV Intermittent every 24 hours  propofol Infusion 50MICROgram(s)/kG/Min IV Continuous <Continuous>  norepinephrine Infusion 0.03MICROgram(s)/kG/Min IV Continuous <Continuous>  heparin  Injectable 5000Unit(s) SubCutaneous every 8 hours  aspirin  chewable 81milliGRAM(s) Oral daily  pantoprazole   Suspension 40milliGRAM(s) Oral daily  ipratropium 17 MICROgram(s) HFA Inhaler 1Puff(s) Inhalation every 6 hours  fentaNYL   Infusion 2MICROgram(s)/kG/Hr IV Continuous <Continuous>  chlorhexidine 4% Liquid 1Application(s) Topical daily  ALBUTerol    90 MICROgram(s) HFA Inhaler 2Puff(s) Inhalation every 6 hours  predniSONE Concentrate 40milliGRAM(s) Oral daily  cisatracurium Infusion 2MICROgram(s)/kG/Min IV Continuous <Continuous>  sodium chloride 3%  Inhalation 3milliLiter(s) Inhalation every 8 hours  insulin glargine Injectable (LANTUS) 5Unit(s) SubCutaneous at bedtime  insulin lispro (HumaLOG) corrective regimen sliding scale  SubCutaneous every 6 hours  dextrose 5%. 1000milliLiter(s) IV Continuous <Continuous>  dextrose 50% Injectable 12.5Gram(s) IV Push once  dextrose 50% Injectable 25Gram(s) IV Push once  dextrose 50% Injectable 25Gram(s) IV Push once    MEDICATIONS  (PRN):  acetaminophen   Tablet 650milliGRAM(s) Oral every 6 hours PRN For Temp greater than 38 C (100.4 F)  dextrose Gel 1Dose(s) Oral once PRN Blood Glucose LESS THAN 70 milliGRAM(s)/deciliter  glucagon  Injectable 1milliGRAM(s) IntraMuscular once PRN Glucose LESS THAN 70 milligrams/deciliter      LABS:                        11.1   8.71  )-----------( 85       ( 28 May 2017 02:53 )             34.2     05-28    128<L>  |  86<L>  |  68<H>  ----------------------------<  384<H>  5.2   |  23  |  4.71<H>    Ca    7.4<L>      28 May 2017 02:53  Phos  7.9     05-28  Mg     2.6     05-28    TPro  5.8<L>  /  Alb  3.3  /  TBili  0.2  /  DBili  x   /  AST  20  /  ALT  12  /  AlkPhos  78  05-28        Arterial Blood Gas: RR 16 05-28 @ 05:40  7.14/79/104/21/96.7/-2.1  ABG lactate: 1.7    Arterial Blood Gas: RR 14 05-28 @ 02:53  7.12/88/90/21/94.8/-1.4  ABG lactate: --      Arterial Blood Gas: RR 12 05-27 @ 15:00  7.18/73/84/22/95.6/-1.1    ABG lactate: --  Arterial Blood Gas:  05-27 @ 12:10  7.17/81/173/23/98.7/0.6  ABG lactate: 1.2  Arterial Blood Gas:  05-27 @ 02:30  7.29/62/86/26/96.4/2.8  ABG lactate: --  Arterial Blood Gas:  05-26 @ 14:30  7.26/64/85/25/96.3/1.7  ABG lactate: --  Arterial Blood Gas:  05-26 @ 12:15  7.22/63/47/21/77.1/-1.8  ABG lactate: 1.4  Arterial Blood Gas:  05-26 @ 08:15  7.23/58/137/21/98.6/-3.0  ABG lactate: 1.4        MICROBIOLOGY: Blood cultures 5/25 NGTD  Urine legionella Negative.    RADIOLOGY:  No evidence of pneumothorax    EKG: None    ASSESSMENT AND PLAN:

## 2017-05-28 NOTE — PROGRESS NOTE ADULT - ASSESSMENT
81 yo M with PMH ESRD, CAD, HTN, DM, COPD, diastolic CHF (EF 65%), HLD, anemia, hypothyroidism s/p respiratory failure.

## 2017-05-28 NOTE — PROGRESS NOTE ADULT - SUBJECTIVE AND OBJECTIVE BOX
Patient seen and examined in MICU; remains intubated & sedated.  Patient remains on pressors.    REVIEW OF SYSTEMS:  UTO-intubated    MEDICATIONS  (STANDING):  atorvastatin 40milliGRAM(s) Oral at bedtime  senna 2Tablet(s) Oral at bedtime  montelukast 10milliGRAM(s) Oral daily  ascorbic acid 500milliGRAM(s) Oral daily  levothyroxine 75MICROGram(s) Oral daily  calcium acetate 667milliGRAM(s) Oral three times a day with meals  multivitamin 1Tablet(s) Oral daily  azithromycin  IVPB  IV Intermittent   azithromycin  IVPB 500milliGRAM(s) IV Intermittent every 24 hours  propofol Infusion 50MICROgram(s)/kG/Min IV Continuous <Continuous>  norepinephrine Infusion 0.03MICROgram(s)/kG/Min IV Continuous <Continuous>  heparin  Injectable 5000Unit(s) SubCutaneous every 8 hours  aspirin  chewable 81milliGRAM(s) Oral daily  pantoprazole   Suspension 40milliGRAM(s) Oral daily  ipratropium 17 MICROgram(s) HFA Inhaler 1Puff(s) Inhalation every 6 hours  insulin lispro (HumaLOG) corrective regimen sliding scale  SubCutaneous every 6 hours  dextrose 5%. 1000milliLiter(s) IV Continuous <Continuous>  dextrose 50% Injectable 12.5Gram(s) IV Push once  dextrose 50% Injectable 25Gram(s) IV Push once  dextrose 50% Injectable 25Gram(s) IV Push once  fentaNYL   Infusion 2MICROgram(s)/kG/Hr IV Continuous <Continuous>  chlorhexidine 4% Liquid 1Application(s) Topical daily  ALBUTerol    90 MICROgram(s) HFA Inhaler 2Puff(s) Inhalation every 6 hours  predniSONE Concentrate 40milliGRAM(s) Oral daily  sodium chloride 3%  Inhalation 3milliLiter(s) Inhalation every 6 hours  cisatracurium Infusion 2MICROgram(s)/kG/Min IV Continuous <Continuous>  terbutaline  Injectable 1milliGRAM(s) SubCutaneous once  nitroglycerin    2% Ointment 1Inch(s) Transdermal once      VITAL:  T(C): , Max: 37 (05-27 @ 08:00)  T(F): , Max: 98.6 (05-27 @ 08:00)  HR: 73  BP: 109/43  BP(mean): 57  RR: 21  SpO2: 93%  Wt(kg): --    I and O's:  I & Os for 24h ending  @ 07:00  =============================================  IN: 1130 ml / OUT: 1600 ml / NET: -470 ml    I & Os for current day (as of  @ 16:27)  =============================================  IN: 547 ml / OUT: 80 ml / NET: 467 ml        PHYSICAL EXAM:    Constitutional: NAD  HEENT: +intubated  Neck: No LAD, No JVD  Respiratory: Diminished B/L  Cardiovascular: S1 and S2  Gastrointestinal: BS+, soft, NT/ND  Extremities: trace edema  Neurological: sedated  : + Gar  Skin: No rashes  Access: Left radial fistula +thrill    LABS:                        11.2   7.70  )-----------( 89       ( 27 May 2017 02:30 )             34.4         136  |  95<L>  |  44<H>  ----------------------------<  164<H>  4.9   |  25  |  3.54<H>    Ca    7.7<L>      27 May 2017 02:30  Phos  6.4       Mg     2.2         TPro  5.8<L>  /  Alb  3.4  /  TBili  0.2  /  DBili  x   /  AST  18  /  ALT  6   /  AlkPhos  80        Urine Studies:  Urinalysis Basic - ( 25 May 2017 17:00 )    Color: YELLOW / Appearance: CLEAR / S.024 / pH: 6.0  Gluc: NEGATIVE / Ketone: NEGATIVE  / Bili: NEGATIVE / Urobili: 2 E.U.   Blood: NEGATIVE / Protein: 300 / Nitrite: NEGATIVE   Leuk Esterase: SMALL / RBC: 0-2 / WBC 2-5   Sq Epi: x / Non Sq Epi: FEW / Bacteria: SMALL    Family updated at bedside Patient seen and examined    REVIEW OF SYSTEMS:  UTO-intubated    MEDICATIONS  (STANDING):  atorvastatin 40milliGRAM(s) Oral at bedtime  montelukast 10milliGRAM(s) Oral daily  ascorbic acid 500milliGRAM(s) Oral daily  levothyroxine 75MICROGram(s) Oral daily  multivitamin 1Tablet(s) Oral daily  azithromycin  IVPB  IV Intermittent   azithromycin  IVPB 500milliGRAM(s) IV Intermittent every 24 hours  propofol Infusion 50MICROgram(s)/kG/Min IV Continuous <Continuous>  norepinephrine Infusion 0.03MICROgram(s)/kG/Min IV Continuous <Continuous>  heparin  Injectable 5000Unit(s) SubCutaneous every 8 hours  aspirin  chewable 81milliGRAM(s) Oral daily  pantoprazole   Suspension 40milliGRAM(s) Oral daily  ipratropium 17 MICROgram(s) HFA Inhaler 1Puff(s) Inhalation every 6 hours  fentaNYL   Infusion 2MICROgram(s)/kG/Hr IV Continuous <Continuous>  chlorhexidine 4% Liquid 1Application(s) Topical daily  ALBUTerol    90 MICROgram(s) HFA Inhaler 2Puff(s) Inhalation every 6 hours  cisatracurium Infusion 2MICROgram(s)/kG/Min IV Continuous <Continuous>  sodium chloride 3%  Inhalation 3milliLiter(s) Inhalation every 8 hours  insulin lispro (HumaLOG) corrective regimen sliding scale  SubCutaneous every 6 hours  dextrose 5%. 1000milliLiter(s) IV Continuous <Continuous>  dextrose 50% Injectable 12.5Gram(s) IV Push once  dextrose 50% Injectable 25Gram(s) IV Push once  dextrose 50% Injectable 25Gram(s) IV Push once  methylPREDNISolone sodium succinate Injectable 40milliGRAM(s) IV Push two times a day  insulin NPH human recombinant 2Unit(s) SubCutaneous every 6 hours  senna 2Tablet(s) Oral at bedtime  docusate sodium 100milliGRAM(s) Oral daily  calcium acetate 1334milliGRAM(s) Oral three times a day with meals      VITAL:  T(C): , Max: 36.8 (05-27 @ 16:00)  T(F): , Max: 98.2 (05-27 @ 16:00)  HR: 67  BP: 132/57  BP(mean): 74  RR: 16  SpO2: 95%  Wt(kg): --    I and O's:  I & Os for 24h ending 05-28 @ 07:00  =============================================  IN: 2387.8 ml / OUT: 180 ml / NET: 2207.8 ml    I & Os for current day (as of 05-28 @ 13:08)  =============================================  IN: 410.8 ml / OUT: 0 ml / NET: 410.8 ml        PHYSICAL EXAM:    Constitutional: NAD  HEENT: +intubated  Neck: No LAD, No JVD  Respiratory: Diminished  Cardiovascular: S1 and S2  Gastrointestinal: BS+, soft, NT/ND  Extremities: No peripheral edema  Neurological: sedated  : + Gar  Skin: No rashes  Access: Left AVF +thrill    LABS:                        11.1   8.71  )-----------( 85       ( 28 May 2017 02:53 )             34.2     05-28    128<L>  |  86<L>  |  68<H>  ----------------------------<  384<H>  5.2   |  23  |  4.71<H>    Ca    7.4<L>      28 May 2017 02:53  Phos  7.9     05-28  Mg     2.6     05-28    TPro  5.8<L>  /  Alb  3.3  /  TBili  0.2  /  DBili  x   /  AST  20  /  ALT  12  /  AlkPhos  78  05-28    Family updated at bedside

## 2017-05-28 NOTE — PROGRESS NOTE ADULT - PROBLEM SELECTOR PLAN 2
remains intubated with minimial improvement in acidosis.  Presented with respiratory acidosis requiring intubation dt COPD exacerbation & + parainfluenza; remains on prednisone, neb tx & IV abx.  Vent management per MICU.,

## 2017-05-28 NOTE — PROGRESS NOTE ADULT - ASSESSMENT
81 yo M with a PMH ESRD on HD MWF, CAD, HTN, DM, COPD (supposed to be on home O2 but pt does not use, also likely HANY), diastolic CHF (EF 65%), HLD, anemia of chronic disease, ?"liver problems" and hypothyroidism admitted for COPD exacerbation in setting of parainfluenza virus. Transferred to MICU for hypercarbic respiratory failure and then intubated for worsening respiratory acidosis on BiPAP.  #Neuro- sedated on propofol   #CV- HFpEF, no signs of volume overload, c/w ASA, holding BP meds Coreg, amlodipine and Imdur, currently on Levo to maintain while sedated   #Pulm- respiratory acidosis requiring intubation, 2/2 COPD exacerbation 2/2 parainfluenza, on prednisone, albuterol and ipratropium Q6hrs, continue with azithromycin.  #GI- OG tube in place, c/w home protonix   #Renal- ESRD on HD, HD per nephrology due Monday.  #ID- parainfluenza +, sent bcx, c/w azithromycin for COPD exacerbation started on 5/25 for total of 7 days (Day 4 of 7).  #Endocrine- DM2 currently on ISS, hgA1c 7.8, continue home levothyroxine.  #PPx- HSQ 83 yo M with a PMH ESRD on HD MWF, CAD, HTN, DM, COPD (supposed to be on home O2 but pt does not use, also likely HANY), diastolic CHF (EF 65%), HLD, anemia of chronic disease, ?"liver problems" and hypothyroidism admitted for COPD exacerbation in setting of parainfluenza virus. Transferred to MICU for hypercarbic respiratory failure and then intubated for worsening respiratory acidosis on BiPAP.  #Neuro- sedated on propofol   #CV- HFpEF, no signs of volume overload, c/w ASA, holding BP meds Coreg, amlodipine and Imdur, currently on Levo to maintain while sedated   #Pulm- respiratory acidosis requiring intubation, 2/2 COPD exacerbation 2/2 parainfluenza, change prednisone to IV solumedrol, albuterol and ipratropium Q6hrs, continue with azithromycin.  #GI- OG tube in place, c/w home protonix   #Renal- ESRD on HD, HD per nephrology due Monday.  #ID- parainfluenza +, sent bcx, c/w azithromycin for COPD exacerbation started on 5/25 for total of 7 days (Day 4 of 7).  #Endocrine- DM2 currently on NPH, hgA1c 7.8, continue home levothyroxine.  #PPx- HSQ

## 2017-05-28 NOTE — PROGRESS NOTE ADULT - PROBLEM SELECTOR PLAN 6
Pt still acidotic with high CO2.  Titrate ventilator setting and repeat ABG? Pt still acidotic with high CO2.  DIFFICULT TO VENTILATE: SIGNIFICANTLY  ACIDOTIC

## 2017-05-28 NOTE — PROGRESS NOTE ADULT - SUBJECTIVE AND OBJECTIVE BOX
doing poorly. intubated, sedated and paralyzed    Pertinent ROS:     MEDICATIONS  (STANDING):  atorvastatin 40milliGRAM(s) Oral at bedtime  montelukast 10milliGRAM(s) Oral daily  ascorbic acid 500milliGRAM(s) Oral daily  levothyroxine 75MICROGram(s) Oral daily  azithromycin  IVPB  IV Intermittent   azithromycin  IVPB 500milliGRAM(s) IV Intermittent every 24 hours  propofol Infusion 50MICROgram(s)/kG/Min IV Continuous <Continuous>  norepinephrine Infusion 0.03MICROgram(s)/kG/Min IV Continuous <Continuous>  heparin  Injectable 5000Unit(s) SubCutaneous every 8 hours  aspirin  chewable 81milliGRAM(s) Oral daily  pantoprazole   Suspension 40milliGRAM(s) Oral daily  ipratropium 17 MICROgram(s) HFA Inhaler 1Puff(s) Inhalation every 6 hours  fentaNYL   Infusion 2MICROgram(s)/kG/Hr IV Continuous <Continuous>  chlorhexidine 4% Liquid 1Application(s) Topical daily  ALBUTerol    90 MICROgram(s) HFA Inhaler 2Puff(s) Inhalation every 6 hours  cisatracurium Infusion 2MICROgram(s)/kG/Min IV Continuous <Continuous>  sodium chloride 3%  Inhalation 3milliLiter(s) Inhalation every 8 hours  insulin lispro (HumaLOG) corrective regimen sliding scale  SubCutaneous every 6 hours  dextrose 5%. 1000milliLiter(s) IV Continuous <Continuous>  dextrose 50% Injectable 12.5Gram(s) IV Push once  dextrose 50% Injectable 25Gram(s) IV Push once  dextrose 50% Injectable 25Gram(s) IV Push once  multivitamin   Solution 5milliLiter(s) Oral daily  methylPREDNISolone sodium succinate Injectable 40milliGRAM(s) IV Push two times a day  insulin NPH human recombinant 2Unit(s) SubCutaneous every 6 hours  calcium acetate 1334milliGRAM(s) Oral three times a day with meals  senna Syrup 10milliLiter(s) Oral daily    MEDICATIONS  (PRN):  dextrose Gel 1Dose(s) Oral once PRN Blood Glucose LESS THAN 70 milliGRAM(s)/deciliter  glucagon  Injectable 1milliGRAM(s) IntraMuscular once PRN Glucose LESS THAN 70 milligrams/deciliter  acetaminophen    Suspension 650milliGRAM(s) Oral every 6 hours PRN For Temp greater than 38.5 C (101.3 F)    Vital Signs Last 24 Hrs  T(C): 36.1, Max: 36.5 (05-28 @ 00:00)  T(F): 96.9, Max: 97.7 (05-28 @ 00:00)  HR: 67 (65 - 73)  BP: 115/55 (94/42 - 163/59)  BP(mean): 68 (54 - 84)  RR: 16 (12 - 20)  SpO2: 93% (92% - 97%)  Mode: AC/ CMV (Assist Control/ Continuous Mandatory Ventilation)  RR (machine): 16  TV (machine): 450  FiO2: 40  PEEP: 8  MAP: 12  PIP: 40    I&O's Summary  I & Os for 24h ending 28 May 2017 07:00  =============================================  IN: 2387.8 ml / OUT: 180 ml / NET: 2207.8 ml    I & Os for current day (as of 28 May 2017 16:15)  =============================================  IN: 784.5 ml / OUT: 10 ml / NET: 774.5 ml    Physical Exam:   Const: intubated   Neuro: Sedated and paralyzed  Respiratory: diminished overall  CVS:S1, S2 no murmur  GI: soft   : hameed    Labs:  ABG - ( 28 May 2017 12:45 )  pH: 7.18  /  pCO2: 70    /  pO2: 89    / HCO3: 21    / Base Excess: -2.1  /  SaO2: 95.7                 11.1   8.71  )-----------( 85       ( 28 May 2017 02:53 )             34.2     05-28    128<L>  |  86<L>  |  68<H>  ----------------------------<  384<H>  5.2   |  23  |  4.71<H>    Ca    7.4<L>      28 May 2017 02:53  Phos  7.9     05-28  Mg     2.6     05-28    TPro  5.8<L>  /  Alb  3.3  /  TBili  0.2  /  DBili  x   /  AST  20  /  ALT  12  /  AlkPhos  78  05-28    CAPILLARY BLOOD GLUCOSE  295 (28 May 2017 12:36)  317 (28 May 2017 06:00)  387 (27 May 2017 23:30)  363 (27 May 2017 18:00)    LIVER FUNCTIONS - ( 28 May 2017 02:53 )  Alb: 3.3 g/dL / Pro: 5.8 g/dL / ALK PHOS: 78 u/L / ALT: 12 u/L / AST: 20 u/L / GGT: x               D DImer  Cultures:     Wound culture:                05-25 @ 14:54  Organism --  Culture w/ gram stain --  Specimen Source BLOOD VENOUS    Wound culture:                05-25 @ 13:48  Organism --  Culture w/ gram stain --  Specimen Source BLOOD PERIPHERAL        Abscess culture:             05-25 @ 14:54  Organism --  Gram Stain --  Specimen Source BLOOD VENOUS    Abscess culture:             05-25 @ 13:48  Organism --  Gram Stain --  Specimen Source BLOOD PERIPHERAL            Tissue culture:           05-25 @ 14:54  Organism --  Gram Stain --  Specimen Source BLOOD VENOUS    Tissue culture:           05-25 @ 13:48  Organism --  Gram Stain --  Specimen Source BLOOD PERIPHERAL        Body Fluid Smear & Culture:                        05-25 @ 14:54  AFB Smear  --  Culture Acid Fast Body Fluid w/ Smear  --  Culture Acid Fast Smear Concentrated   --    Culture Results:     --  Specimen Source BLOOD VENOUS    Body Fluid Smear & Culture:                        05-25 @ 13:48  AFB Smear  --  Culture Acid Fast Body Fluid w/ Smear  --  Culture Acid Fast Smear Concentrated   --    Culture Results:     --  Specimen Source BLOOD PERIPHERAL      Rapid Respiratory Viral Panel Result        05-25 @ 17:00  Rapid RVP --  Coronovirus NOT DETECTED  Adenovirus NOT DETECTED  Bordetella Pertussis NOT DETECTED  Chlamydia Pneumonia NOT DETECTED  Entero/RhinovirusNOT DETECTED  HKU1 Coronovirus NOT DETECTED  HMPV Coronovirus NOT DETECTED  Influenza A NOT DETECTED (any subtype)  Influenza AH1 NOT DETECTED  Influenza AH1 2009 NOT DETECTED  Influenza AH3 NOT DETECTED  Influenza B NOT DETECTED  Mycoplasma Pneumoniae NOT DETECTED This nucleic acid amplification assay was performed using  the [employer]. The following pathogens are tested  for: Adenovirus, Coronavirus 229E, Coronavirus HKU1,  Coronavirus NL63, Coronavirus OC43, Human Metapneumovirus  (HMPV),  NL63 Coronovirus NOT DETECTED  OC43 Coronovirus NOT DETECTED  Parainfluenza 1 NOT DETECTED  Parainfluenza 2 NOT DETECTED  Parainfluenza 3 POSITIVE  Parainfluenza 4 NOT DETECTED  Resp Syncytial Virus NOT DETECTED    Studies  Chest X-RAY   EXAM:  RAD CHEST PORTABLE IMMEDIATE        PROCEDURE DATE:  May 27 2017   Cardiac silhouette normal in size. Right inferior lateral lung excluded   from film. Endotracheal tube tip at thoracic inlet. Endotracheal tube tip   courses off film, not visualized below lower heart. Visualized lungs are   clear. No pneumothorax. Old left-sided rib fractures.    IMPRESSION:   Visualized lungs are clear.

## 2017-05-28 NOTE — PROGRESS NOTE ADULT - PROBLEM SELECTOR PLAN 1
Intubated. Management per MICU Intubated. Management per MICU, remains significantly hypercarbic and acidotic: HAVING PERMISSIVE HYPERCAPNIA!

## 2017-05-29 NOTE — PROGRESS NOTE ADULT - ASSESSMENT
82M with ESRD on HD MWF, CAD, HTN, DM, COPD (supposed to be on home O2 but pt does not use, also likely HANY), diastolic CHF (EF 65%), HLD, anemia of chronic disease, ?"liver problems" and hypothyroidism admitted for COPD exacerbation in setting of parainfluenza virus. Transferred to MICU for hypercarbic respiratory failure and then intubated for worsening respiratory acidosis on BiPAP.    #Neuro- sedated on propofol     #CV: requiring minimal dose of levo, attempting to wean  - HFpEF: ASA held due to bleeding oropharyngeal bleeding, holding BP meds Coreg, amlodipine and Imdur  - Appers volume overloaded will increasing edema likely due to need for HD    #Pulm: respiratory acidosis requiring intubation due to COPD exacerbation in setting of parainfluenza  - IV solumedrol, albuterol and ipratropium Q6hrs, continue with azithromycin  - Adjusted vent setting due to auto PEEP, follow up ABG    #GI: OG tube in place with oropharyngeal bleeding  - ASA stopped due to clot, OG tube suctioned after with no further bleeding observed  - Switched PPI to IV, escalate to BID if more bleeding    #Renal: ESRD on HD  - HD per nephrology today: now with hyperkalemia likely associated with need for HD  - Hyponatremia likely hypervolemic due to need for HD, will check urine and serum osms    #ID: parainfluenza +, sent bcx, c/w azithromycin for COPD exacerbation started on 5/25 for total of 7 days (Day 4 of 7)    #Endocrine: switch insulin drip to basal bolus after HD, continue home levothyroxine    #PPx: HSQ

## 2017-05-29 NOTE — PROGRESS NOTE ADULT - ASSESSMENT
83 yo M with a PMH ESRD on HD MWF, CAD, HTN, DM, COPD (supposed to be on home O2 but pt does not use, also likely HANY), diastolic CHF (EF 65%), HLD, anemia of chronic disease, ?"liver problems" and hypothyroidism admitted for COPD exacerbation in setting of parainfluenza virus. Transferred to MICU for hypercarbic respiratory failure and then intubated for worsening respiratory acidosis on BiPAP.  #Neuro- sedated on propofol   #CV- HFpEF, no signs of volume overload, c/w ASA, holding BP meds Coreg, amlodipine and Imdur, currently on Levo to maintain while sedated   #Pulm- respiratory acidosis requiring intubation, 2/2 COPD exacerbation 2/2 parainfluenza, on  IV solumedrol, albuterol and ipratropium Q6hrs, continue with azithromycin.  #GI- OG tube in place, c/w home protonix   #Renal- ESRD on HD, HD per nephrology due Monday.  #ID- parainfluenza +, sent bcx, c/w azithromycin for COPD exacerbation started on 5/25 for total of 7 days (Day 5 of 7).  #Endocrine- DM2 currently on NPH, hgA1c 7.8, continue home levothyroxine.  #PPx- HSQ

## 2017-05-29 NOTE — PROVIDER CONTACT NOTE (OTHER) - BACKGROUND
Pt orally intubated, sedated on Propofol and Fentanyl drip, also on Nimbex for medically paralyzing pt.

## 2017-05-29 NOTE — PROVIDER CONTACT NOTE (OTHER) - ASSESSMENT
pt orally intubated, sedated on propofol and fentanyl drip, also medically paralyzed with Nimbex drip, also on Levophed drip for low BP, pt's Fs came down to 456 from 458

## 2017-05-29 NOTE — PROVIDER CONTACT NOTE (OTHER) - ASSESSMENT
pt orally intubated, sedated on propofol, anf fentanyl, also medically paralyzed with nimbex drip, also on levophed drip for low BP

## 2017-05-29 NOTE — PROGRESS NOTE ADULT - PROBLEM SELECTOR PLAN 3
Severe acidosis: mostly hypercarbic, on full vent support: To cont current meds   and mechanical vent suport

## 2017-05-29 NOTE — PROGRESS NOTE ADULT - PROBLEM SELECTOR PLAN 1
on iv steroids with bronchodilators: severely acidotic: with permissive hypercapnia: the PIP is  still high. Remains paralyzed and sedated.  On D5/7 of antibiotics

## 2017-05-29 NOTE — PROGRESS NOTE ADULT - SUBJECTIVE AND OBJECTIVE BOX
CHIEF COMPLAINT: Patient is a 82y old  Male who presents with a chief complaint of   Interval Events:    REVIEW OF SYSTEMS:  Constitutional:   Eyes:  ENT:  CV:  Resp:  GI:  :  MSK:  Integumentary:  Neurological:  Psychiatric:  Endocrine:  Hematologic/Lymphatic:  Allergic/Immunologic:  [ ] All other systems negative  [ ] Unable to assess ROS because ________    OBJECTIVE:  ICU Vital Signs Last 24 Hrs  T(C): 36.3, Max: 36.6 (05-29 @ 00:00)  T(F): 97.4, Max: 97.8 (05-29 @ 00:00)  HR: 63 (63 - 83)  BP: 129/57 (98/49 - 149/61)  BP(mean): 72 (60 - 82)  ABP: --  ABP(mean): --  RR: 16 (12 - 17)  SpO2: 99% (92% - 99%)    Mode: AC/ CMV (Assist Control/ Continuous Mandatory Ventilation), RR (machine): 16, TV (machine): 450, FiO2: 40, PEEP: 8, MAP: 12, PIP: 40    I & Os for current day (as of 05-29 @ 07:27)  =============================================  IN: 3789.3 ml / OUT: 59 ml / NET: 3730.3 ml    CAPILLARY BLOOD GLUCOSE  375 (29 May 2017 06:00)      PHYSICAL EXAM:  General:   HEENT:   Lymph Nodes:  Neck:   Respiratory:   Cardiovascular:   Abdomen:   Extremities:   Skin:   Neurological:  Psychiatry:    HOSPITAL MEDICATIONS:  MEDICATIONS  (STANDING):  atorvastatin 40milliGRAM(s) Oral at bedtime  montelukast 10milliGRAM(s) Oral daily  ascorbic acid 500milliGRAM(s) Oral daily  levothyroxine 75MICROGram(s) Oral daily  azithromycin  IVPB  IV Intermittent   azithromycin  IVPB 500milliGRAM(s) IV Intermittent every 24 hours  propofol Infusion 50MICROgram(s)/kG/Min IV Continuous <Continuous>  norepinephrine Infusion 0.03MICROgram(s)/kG/Min IV Continuous <Continuous>  heparin  Injectable 5000Unit(s) SubCutaneous every 8 hours  aspirin  chewable 81milliGRAM(s) Oral daily  pantoprazole   Suspension 40milliGRAM(s) Oral daily  ipratropium 17 MICROgram(s) HFA Inhaler 1Puff(s) Inhalation every 6 hours  fentaNYL   Infusion 2MICROgram(s)/kG/Hr IV Continuous <Continuous>  chlorhexidine 4% Liquid 1Application(s) Topical daily  ALBUTerol    90 MICROgram(s) HFA Inhaler 2Puff(s) Inhalation every 6 hours  cisatracurium Infusion 2MICROgram(s)/kG/Min IV Continuous <Continuous>  sodium chloride 3%  Inhalation 3milliLiter(s) Inhalation every 8 hours  dextrose 5%. 1000milliLiter(s) IV Continuous <Continuous>  dextrose 50% Injectable 12.5Gram(s) IV Push once  dextrose 50% Injectable 25Gram(s) IV Push once  dextrose 50% Injectable 25Gram(s) IV Push once  multivitamin   Solution 5milliLiter(s) Oral daily  methylPREDNISolone sodium succinate Injectable 40milliGRAM(s) IV Push two times a day  calcium acetate 1334milliGRAM(s) Oral three times a day with meals  senna Syrup 10milliLiter(s) Oral daily  insulin Infusion 9Unit(s)/Hr IV Continuous <Continuous>    MEDICATIONS  (PRN):  dextrose Gel 1Dose(s) Oral once PRN Blood Glucose LESS THAN 70 milliGRAM(s)/deciliter  glucagon  Injectable 1milliGRAM(s) IntraMuscular once PRN Glucose LESS THAN 70 milligrams/deciliter  acetaminophen    Suspension 650milliGRAM(s) Oral every 6 hours PRN For Temp greater than 38.5 C (101.3 F)      LABS:  (05-29 @ 05:00)                        10.3  6.13 )-----------( 80                 31.2    Neutrophils = -- (--%)  Lymphocytes = -- (--%)  Eosinophils = -- (--%)  Basophils = -- (--%)  Monocytes = -- (--%)  Bands = --%    WBC Trend: 6.13<--, 8.71<--, 7.70<--  Hb Trend: 10.3<--, 11.1<--, 11.2<--, 12.3<--, 12.7<--  Plt Trend: 80<--, 85<--, 89<--, 85<--, 81<--  05-29    122<L>  |  81<L>  |  87<H>  ----------------------------<  386<H>  5.9<H>   |  19<L>  |  5.02<H>    Ca    7.1<L>      29 May 2017 05:00  Phos  7.2     05-29  Mg     2.5     05-29    TPro  5.6<L>  /  Alb  3.1<L>  /  TBili  0.2  /  DBili  x   /  AST  15  /  ALT  11  /  AlkPhos  72  05-29    Creatinine Trend: 5.02<--, 4.97<--, 4.71<--, 3.54<--, 1.99<--, 5.18<--      Arterial Blood Gas:  05-29 @ 05:00  7.17/63/104/19/97.0/-5.2  ABG lactate: 1.3  Arterial Blood Gas:  05-28 @ 23:00  7.17/70/117/20/97.2/-3.3  ABG lactate: 1.2  Arterial Blood Gas:  05-28 @ 19:00  7.14/71/52/19/77.4/-5.2  ABG lactate: 1.2  Arterial Blood Gas:  05-28 @ 12:45  7.18/70/89/21/95.7/-2.1  ABG lactate: --  Arterial Blood Gas:  05-28 @ 05:40  7.14/79/104/21/96.7/-2.1  ABG lactate: 1.7  Arterial Blood Gas:  05-28 @ 02:53  7.12/88/90/21/94.8/-1.4  ABG lactate: --  Arterial Blood Gas:  05-27 @ 15:00  7.18/73/84/22/95.6/-1.1  ABG lactate: --  Arterial Blood Gas:  05-27 @ 12:10  7.17/81/173/23/98.7/0.6  ABG lactate: 1.2            MICROBIOLOGY:   Blood Cx:Culture - Blood (05.25.17 @ 14:54)    Culture - Blood:   NO ORGANISMS ISOLATED  NO ORGANISMS ISOLATED AT 48 HRS.    Specimen Source: BLOOD VENOUS      Urine Cx:  Sputum Cx:  Legionella:  RVP:    RADIOLOGY:  X Ray:  CT:  MRI:  Ultrasound:  [ ] Reviewed and interpreted by me    EKG: CHIEF COMPLAINT: Dyspnea    INTERVAL HISTORY: Pt started on insulin drip overnight for hyperglycemia to 400s in context of steroids along with anion gap metabolic acidosis. Hypercapnic with likely contribution from auto-PEEP. Vent settings adjusted.     REVIEW OF SYSTEMS:  Constitutional:   Eyes:  ENT:  CV:  Resp:  GI:  :  MSK:  Integumentary:  Neurological:  Psychiatric:  Endocrine:  Hematologic/Lymphatic:  Allergic/Immunologic:  [ ] All other systems negative  [ x ] Unable to assess ROS because pt intubated    OBJECTIVE:  ICU Vital Signs Last 24 Hrs  T(C): 36.3, Max: 36.6 (05-29 @ 00:00)  T(F): 97.4, Max: 97.8 (05-29 @ 00:00)  HR: 63 (63 - 83)  BP: 129/57 (98/49 - 149/61)  BP(mean): 72 (60 - 82)  ABP: --  ABP(mean): --  RR: 16 (12 - 17)  SpO2: 99% (92% - 99%)    Mode: AC/ CMV (Assist Control/ Continuous Mandatory Ventilation), RR (machine): 16, TV (machine): 450, FiO2: 40, PEEP: 8, MAP: 12, PIP: 40    I & Os for current day (as of 05-29 @ 07:27)  =============================================  IN: 3789.3 ml / OUT: 59 ml / NET: 3730.3 ml    CAPILLARY BLOOD GLUCOSE  375 (29 May 2017 06:00)      PHYSICAL EXAM:  General: intubated  HEENT: mild bleeding around OG tube  Lymph Nodes: no cervical lymphadenopathy  Neck: no JVD  Respiratory: intubated, rhonchi b/l  Cardiovascular: RRR  Abdomen: distended but soft  Extremities: trace edema   Skin: warm and dry  Neurological: sedated and medically paralyzed  Psychiatry:    HOSPITAL MEDICATIONS:  MEDICATIONS  (STANDING):  atorvastatin 40milliGRAM(s) Oral at bedtime  montelukast 10milliGRAM(s) Oral daily  ascorbic acid 500milliGRAM(s) Oral daily  levothyroxine 75MICROGram(s) Oral daily  azithromycin  IVPB  IV Intermittent   azithromycin  IVPB 500milliGRAM(s) IV Intermittent every 24 hours  propofol Infusion 50MICROgram(s)/kG/Min IV Continuous <Continuous>  norepinephrine Infusion 0.03MICROgram(s)/kG/Min IV Continuous <Continuous>  heparin  Injectable 5000Unit(s) SubCutaneous every 8 hours  aspirin  chewable 81milliGRAM(s) Oral daily  pantoprazole   Suspension 40milliGRAM(s) Oral daily  ipratropium 17 MICROgram(s) HFA Inhaler 1Puff(s) Inhalation every 6 hours  fentaNYL   Infusion 2MICROgram(s)/kG/Hr IV Continuous <Continuous>  chlorhexidine 4% Liquid 1Application(s) Topical daily  ALBUTerol    90 MICROgram(s) HFA Inhaler 2Puff(s) Inhalation every 6 hours  cisatracurium Infusion 2MICROgram(s)/kG/Min IV Continuous <Continuous>  sodium chloride 3%  Inhalation 3milliLiter(s) Inhalation every 8 hours  dextrose 5%. 1000milliLiter(s) IV Continuous <Continuous>  dextrose 50% Injectable 12.5Gram(s) IV Push once  dextrose 50% Injectable 25Gram(s) IV Push once  dextrose 50% Injectable 25Gram(s) IV Push once  multivitamin   Solution 5milliLiter(s) Oral daily  methylPREDNISolone sodium succinate Injectable 40milliGRAM(s) IV Push two times a day  calcium acetate 1334milliGRAM(s) Oral three times a day with meals  senna Syrup 10milliLiter(s) Oral daily  insulin Infusion 9Unit(s)/Hr IV Continuous <Continuous>    MEDICATIONS  (PRN):  dextrose Gel 1Dose(s) Oral once PRN Blood Glucose LESS THAN 70 milliGRAM(s)/deciliter  glucagon  Injectable 1milliGRAM(s) IntraMuscular once PRN Glucose LESS THAN 70 milligrams/deciliter  acetaminophen    Suspension 650milliGRAM(s) Oral every 6 hours PRN For Temp greater than 38.5 C (101.3 F)      LABS:  (05-29 @ 05:00)                        10.3  6.13 )-----------( 80                 31.2    Neutrophils = -- (--%)  Lymphocytes = -- (--%)  Eosinophils = -- (--%)  Basophils = -- (--%)  Monocytes = -- (--%)  Bands = --%    WBC Trend: 6.13<--, 8.71<--, 7.70<--  Hb Trend: 10.3<--, 11.1<--, 11.2<--, 12.3<--, 12.7<--  Plt Trend: 80<--, 85<--, 89<--, 85<--, 81<--  05-29    122<L>  |  81<L>  |  87<H>  ----------------------------<  386<H>  5.9<H>   |  19<L>  |  5.02<H>    Ca    7.1<L>      29 May 2017 05:00  Phos  7.2     05-29  Mg     2.5     05-29    TPro  5.6<L>  /  Alb  3.1<L>  /  TBili  0.2  /  DBili  x   /  AST  15  /  ALT  11  /  AlkPhos  72  05-29    Creatinine Trend: 5.02<--, 4.97<--, 4.71<--, 3.54<--, 1.99<--, 5.18<--      Arterial Blood Gas:  05-29 @ 05:00  7.17/63/104/19/97.0/-5.2  ABG lactate: 1.3  Arterial Blood Gas:  05-28 @ 23:00  7.17/70/117/20/97.2/-3.3  ABG lactate: 1.2  Arterial Blood Gas:  05-28 @ 19:00  7.14/71/52/19/77.4/-5.2  ABG lactate: 1.2  Arterial Blood Gas:  05-28 @ 12:45  7.18/70/89/21/95.7/-2.1  ABG lactate: --  Arterial Blood Gas:  05-28 @ 05:40  7.14/79/104/21/96.7/-2.1  ABG lactate: 1.7  Arterial Blood Gas:  05-28 @ 02:53  7.12/88/90/21/94.8/-1.4  ABG lactate: --  Arterial Blood Gas:  05-27 @ 15:00  7.18/73/84/22/95.6/-1.1  ABG lactate: --  Arterial Blood Gas:  05-27 @ 12:10  7.17/81/173/23/98.7/0.6  ABG lactate: 1.2            MICROBIOLOGY:   Blood Cx:Culture - Blood (05.25.17 @ 14:54)    Culture - Blood:   NO ORGANISMS ISOLATED  NO ORGANISMS ISOLATED AT 48 HRS.    Specimen Source: BLOOD VENOUS      Urine Cx:  Sputum Cx:  Legionella:  RVP:    RADIOLOGY:  X Ray:  CT:  MRI:  Ultrasound:  [ ] Reviewed and interpreted by me    EKG:

## 2017-05-29 NOTE — PROGRESS NOTE ADULT - SUBJECTIVE AND OBJECTIVE BOX
remains intubatred , sedated, paralyzed as well as on multiple pressors.     Any change in ROS: cnat obtain     MEDICATIONS  (STANDING):  atorvastatin 40milliGRAM(s) Oral at bedtime  montelukast 10milliGRAM(s) Oral daily  ascorbic acid 500milliGRAM(s) Oral daily  levothyroxine 75MICROGram(s) Oral daily  azithromycin  IVPB  IV Intermittent   azithromycin  IVPB 500milliGRAM(s) IV Intermittent every 24 hours  propofol Infusion 50MICROgram(s)/kG/Min IV Continuous <Continuous>  norepinephrine Infusion 0.03MICROgram(s)/kG/Min IV Continuous <Continuous>  heparin  Injectable 5000Unit(s) SubCutaneous every 8 hours  aspirin  chewable 81milliGRAM(s) Oral daily  pantoprazole   Suspension 40milliGRAM(s) Oral daily  ipratropium 17 MICROgram(s) HFA Inhaler 1Puff(s) Inhalation every 6 hours  fentaNYL   Infusion 2MICROgram(s)/kG/Hr IV Continuous <Continuous>  chlorhexidine 4% Liquid 1Application(s) Topical daily  ALBUTerol    90 MICROgram(s) HFA Inhaler 2Puff(s) Inhalation every 6 hours  sodium chloride 3%  Inhalation 3milliLiter(s) Inhalation every 8 hours  dextrose 5%. 1000milliLiter(s) IV Continuous <Continuous>  dextrose 50% Injectable 12.5Gram(s) IV Push once  dextrose 50% Injectable 25Gram(s) IV Push once  dextrose 50% Injectable 25Gram(s) IV Push once  multivitamin   Solution 5milliLiter(s) Oral daily  methylPREDNISolone sodium succinate Injectable 40milliGRAM(s) IV Push two times a day  calcium acetate 1334milliGRAM(s) Oral three times a day with meals  senna Syrup 10milliLiter(s) Oral daily  insulin Infusion 5Unit(s)/Hr IV Continuous <Continuous>  metoclopramide Injectable 5milliGRAM(s) IV Push once    MEDICATIONS  (PRN):  dextrose Gel 1Dose(s) Oral once PRN Blood Glucose LESS THAN 70 milliGRAM(s)/deciliter  glucagon  Injectable 1milliGRAM(s) IntraMuscular once PRN Glucose LESS THAN 70 milligrams/deciliter  acetaminophen    Suspension 650milliGRAM(s) Oral every 6 hours PRN For Temp greater than 38.5 C (101.3 F)    Vital Signs Last 24 Hrs  T(C): 36.2, Max: 36.6 (05-29 @ 00:00)  T(F): 97.1, Max: 97.8 (05-29 @ 00:00)  HR: 64 (63 - 83)  BP: 121/52 (103/49 - 149/61)  BP(mean): 68 (61 - 82)  RR: 16 (16 - 16)  SpO2: 97% (92% - 99%)  Mode: AC/ CMV (Assist Control/ Continuous Mandatory Ventilation)  RR (machine): 16  TV (machine): 450  FiO2: 40  PEEP: 8  MAP: 13  PIP: 35    I&O's Summary  I & Os for 24h ending 29 May 2017 07:00  =============================================  IN: 3789.3 ml / OUT: 59 ml / NET: 3730.3 ml    I & Os for current day (as of 29 May 2017 10:45)  =============================================  IN: 172 ml / OUT: 12 ml / NET: 160 ml        Physical Exam:   GENERAL: NAD, well-groomed, well-developed  HEENT: MEHNAZ/   Atraumatic, Normocephalic  ENMT: No tonsillar erythema, exudates, or enlargement; Moist mucous membranes, Good dentition, No lesions  NECK: Supple, No JVD, Normal thyroid  CHEST/LUNG: Clear to percussion bilaterally; No rales, rhonchi, wheezing, or rubs  CVS: Regular rate and rhythm; No murmurs, rubs, or gallops  GI: : Soft, Nontender, Nondistended; Bowel sounds present  NERVOUS SYSTEM:  Intubated, sedated, paralyzed   EXTREMITIES:  2+ Peripheral Pulses, No clubbing, cyanosis, or edema  LYMPH: No lymphadenopathy noted  SKIN: No rashes or lesions  ENDOCRINOLOGY: No Thyromegaly  PSYCH:sedated    Labs:  ABG - ( 29 May 2017 05:00 )  pH: 7.17  /  pCO2: 63    /  pO2: 104   / HCO3: 19    / Base Excess: -5.2  /  SaO2: 97.0                         10.3   6.13  )-----------( 80       ( 29 May 2017 05:00 )             31.2     05-29    122<L>  |  81<L>  |  87<H>  ----------------------------<  386<H>  5.9<H>   |  19<L>  |  5.02<H>    Ca    7.1<L>      29 May 2017 05:00  Phos  7.2     05-29  Mg     2.5     05-29    TPro  5.6<L>  /  Alb  3.1<L>  /  TBili  0.2  /  DBili  x   /  AST  15  /  ALT  11  /  AlkPhos  72  05-29    CAPILLARY BLOOD GLUCOSE  225 (29 May 2017 10:00)  259 (29 May 2017 09:00)  283 (29 May 2017 08:00)  375 (29 May 2017 06:00)  404 (29 May 2017 05:00)  379 (29 May 2017 04:00)  443 (29 May 2017 03:00)  456 (29 May 2017 02:00)  458 (29 May 2017 00:00)  385 (28 May 2017 18:00)  295 (28 May 2017 12:36)    LIVER FUNCTIONS - ( 29 May 2017 05:00 )  Alb: 3.1 g/dL / Pro: 5.6 g/dL / ALK PHOS: 72 u/L / ALT: 11 u/L / AST: 15 u/L / GGT: x             Wound culture:                05-25 @ 14:54  Organism --  Culture w/ gram stain --  Specimen Source BLOOD VENOUS    Wound culture:                05-25 @ 13:48  Organism --  Culture w/ gram stain --  Specimen Source BLOOD PERIPHERAL        Abscess culture:             05-25 @ 14:54  Organism --  Gram Stain --  Specimen Source BLOOD VENOUS    Abscess culture:             05-25 @ 13:48  Organism --  Gram Stain --  Specim    Tissue culture:           05-25 @ 14:54  Organism --  Gram Stain --  Specimen Source BLOOD VENOUS    Tissue culture:           05-25 @ 13:48  Organism --  Gram Stain --  Specimen Source BLOOD PERIPHERAL        Body Fluid Smear & Culture:                        05-25 @ 14:54  AFB Smear  --  Culture Acid Fast Body Fluid w/ Smear  --  Culture Acid Fast Smear Concentrated   --    Culture Results:     --  Specimen Source BLOOD VENOUS    Body Fluid Smear & Culture:                        05-25 @ 13:48  AFB Smear  --  Culture Acid Fast Body Fluid w/ Smear  --  Culture Acid Fast Smear Concentrated   --    Culture Results:     --  Specimen Source BLOOD PERIPHERAL    Studies  Chest X-RAY:  FINDINGS:     Cardiac silhouette normal in size. Right inferior lateral lung excluded   from film. Endotracheal tube tip at thoracic inlet. Endotracheal tube tip   courses off film, not visualized below lower heart. Visualized lungs are   clear. No pneumothorax. Old left-sided rib fractures.    IMPRESSION:   Visualized lungs are clear.  CT SCAN Chest     CT Abdomen  Venous Dopplers: LE:   Others

## 2017-05-29 NOTE — PROGRESS NOTE ADULT - SUBJECTIVE AND OBJECTIVE BOX
Patient seen and examined in bed; intubated/sedated    REVIEW OF SYSTEMS:  UTO- intubated/sedated    MEDICATIONS  (STANDING):  atorvastatin 40milliGRAM(s) Oral at bedtime  montelukast 10milliGRAM(s) Oral daily  ascorbic acid 500milliGRAM(s) Oral daily  levothyroxine 75MICROGram(s) Oral daily  azithromycin  IVPB  IV Intermittent   azithromycin  IVPB 500milliGRAM(s) IV Intermittent every 24 hours  propofol Infusion 50MICROgram(s)/kG/Min IV Continuous <Continuous>  norepinephrine Infusion 0.03MICROgram(s)/kG/Min IV Continuous <Continuous>  heparin  Injectable 5000Unit(s) SubCutaneous every 8 hours  aspirin  chewable 81milliGRAM(s) Oral daily  ipratropium 17 MICROgram(s) HFA Inhaler 1Puff(s) Inhalation every 6 hours  fentaNYL   Infusion 2MICROgram(s)/kG/Hr IV Continuous <Continuous>  chlorhexidine 4% Liquid 1Application(s) Topical daily  ALBUTerol    90 MICROgram(s) HFA Inhaler 2Puff(s) Inhalation every 6 hours  sodium chloride 3%  Inhalation 3milliLiter(s) Inhalation every 8 hours  dextrose 5%. 1000milliLiter(s) IV Continuous <Continuous>  dextrose 50% Injectable 12.5Gram(s) IV Push once  dextrose 50% Injectable 25Gram(s) IV Push once  dextrose 50% Injectable 25Gram(s) IV Push once  multivitamin   Solution 5milliLiter(s) Oral daily  methylPREDNISolone sodium succinate Injectable 40milliGRAM(s) IV Push two times a day  calcium acetate 1334milliGRAM(s) Oral three times a day with meals  senna Syrup 10milliLiter(s) Oral daily  insulin Infusion 3Unit(s)/Hr IV Continuous <Continuous>  cisatracurium Infusion 2MICROgram(s)/kG/Min IV Continuous <Continuous>  pantoprazole  Injectable 40milliGRAM(s) IV Push daily      VITAL:  T(C): , Max: 36.6 (05-29 @ 00:00)  T(F): , Max: 97.8 (05-29 @ 00:00)  HR: 62  BP: 116/53  BP(mean): 68  RR: 20  SpO2: 98%  Wt(kg): --    I and O's:  I & Os for 24h ending 05-29 @ 07:00  =============================================  IN: 3789.3 ml / OUT: 59 ml / NET: 3730.3 ml    I & Os for current day (as of 05-29 @ 15:29)  =============================================  IN: 172 ml / OUT: 12 ml / NET: 160 ml        PHYSICAL EXAM:    Constitutional: NAD  HEENT: intubated  Neck: No LAD, No JVD  Respiratory: diminished  Cardiovascular: S1 and S2  Gastrointestinal: BS+, soft, NT/ND  Extremities: No peripheral edema  Neurological: A/O x 3, no focal deficits  Psychiatric: Normal mood, normal affect  : No Gar  Skin: No rashes  Access: Not applicable    LABS:                        10.3   6.13  )-----------( 80       ( 29 May 2017 05:00 )             31.2     05-29    122<L>  |  81<L>  |  87<H>  ----------------------------<  386<H>  5.9<H>   |  19<L>  |  5.02<H>    Ca    7.1<L>      29 May 2017 05:00  Phos  7.2     05-29  Mg     2.5     05-29    TPro  5.6<L>  /  Alb  3.1<L>  /  TBili  0.2  /  DBili  x   /  AST  15  /  ALT  11  /  AlkPhos  72  05-29      Urine Studies:          RADIOLOGY & ADDITIONAL STUDIES:        ASSESSMENT      RECOMMENDATIONS Patient seen and examined in bed; intubated/sedated    REVIEW OF SYSTEMS:  UTO- intubated/sedated    MEDICATIONS  (STANDING):  atorvastatin 40milliGRAM(s) Oral at bedtime  montelukast 10milliGRAM(s) Oral daily  ascorbic acid 500milliGRAM(s) Oral daily  levothyroxine 75MICROGram(s) Oral daily  azithromycin  IVPB  IV Intermittent   azithromycin  IVPB 500milliGRAM(s) IV Intermittent every 24 hours  propofol Infusion 50MICROgram(s)/kG/Min IV Continuous <Continuous>  norepinephrine Infusion 0.03MICROgram(s)/kG/Min IV Continuous <Continuous>  heparin  Injectable 5000Unit(s) SubCutaneous every 8 hours  aspirin  chewable 81milliGRAM(s) Oral daily  ipratropium 17 MICROgram(s) HFA Inhaler 1Puff(s) Inhalation every 6 hours  fentaNYL   Infusion 2MICROgram(s)/kG/Hr IV Continuous <Continuous>  chlorhexidine 4% Liquid 1Application(s) Topical daily  ALBUTerol    90 MICROgram(s) HFA Inhaler 2Puff(s) Inhalation every 6 hours  sodium chloride 3%  Inhalation 3milliLiter(s) Inhalation every 8 hours  dextrose 5%. 1000milliLiter(s) IV Continuous <Continuous>  dextrose 50% Injectable 12.5Gram(s) IV Push once  dextrose 50% Injectable 25Gram(s) IV Push once  dextrose 50% Injectable 25Gram(s) IV Push once  multivitamin   Solution 5milliLiter(s) Oral daily  methylPREDNISolone sodium succinate Injectable 40milliGRAM(s) IV Push two times a day  calcium acetate 1334milliGRAM(s) Oral three times a day with meals  senna Syrup 10milliLiter(s) Oral daily  insulin Infusion 3Unit(s)/Hr IV Continuous <Continuous>  cisatracurium Infusion 2MICROgram(s)/kG/Min IV Continuous <Continuous>  pantoprazole  Injectable 40milliGRAM(s) IV Push daily      VITAL:  T(C): , Max: 36.6 (05-29 @ 00:00)  T(F): , Max: 97.8 (05-29 @ 00:00)  HR: 62  BP: 116/53  BP(mean): 68  RR: 20  SpO2: 98%  Wt(kg): --    I and O's:  I & Os for 24h ending 05-29 @ 07:00  =============================================  IN: 3789.3 ml / OUT: 59 ml / NET: 3730.3 ml    I & Os for current day (as of 05-29 @ 15:29)  =============================================  IN: 172 ml / OUT: 12 ml / NET: 160 ml        PHYSICAL EXAM:    Constitutional: NAD  HEENT: intubated  Neck: No LAD, No JVD  Respiratory: diminished  Cardiovascular: S1 and S2  Gastrointestinal: BS+, soft, NT/ND  Extremities: No peripheral edema  Neurological: A/O x 3, no focal deficits  Psychiatric: Normal mood, normal affect  : + Gar  Skin: No rashes  Access: Left radial fistula +thrill    LABS:                        10.3   6.13  )-----------( 80       ( 29 May 2017 05:00 )             31.2     05-29    122<L>  |  81<L>  |  87<H>  ----------------------------<  386<H>  5.9<H>   |  19<L>  |  5.02<H>    Ca    7.1<L>      29 May 2017 05:00  Phos  7.2     05-29  Mg     2.5     05-29    TPro  5.6<L>  /  Alb  3.1<L>  /  TBili  0.2  /  DBili  x   /  AST  15  /  ALT  11  /  AlkPhos  72  05-29

## 2017-05-29 NOTE — PROGRESS NOTE ADULT - PROBLEM SELECTOR PLAN 5
likely multifactorial: mostly intubated with resp failure and sedation. Cont to maintain MAP? 65 mm Hg

## 2017-05-29 NOTE — PROGRESS NOTE ADULT - PROBLEM SELECTOR PLAN 2
remains intubated with minimal improvement in acidosis.  Presented with respiratory acidosis requiring intubation dt COPD exacerbation & + parainfluenza; remains on prednisone, neb tx & IV abx.  Vent management per MICU.,

## 2017-05-30 NOTE — PROGRESS NOTE ADULT - ASSESSMENT
82M with ESRD on HD MWF, CAD, HTN, DM, COPD (supposed to be on home O2 but pt does not use, also likely HANY), diastolic CHF (EF 65%), HLD, anemia of chronic disease, ?"liver problems" and hypothyroidism admitted for COPD exacerbation in setting of parainfluenza virus. Transferred to MICU for hypercarbic respiratory failure and intubated, remains acidotic and hypercarbic    #Neuro- sedated on propofol     #CV: requiring minimal dose of levo, attempting to wean  - HFpEF: ASA held due to bleeding oropharyngeal bleeding, holding BP meds Coreg, amlodipine and Imdur    #Pulm: intubated with hypercarbia, mixed respiratory and metabolic acidosis requiring intubation due to COPD exacerbation in setting of parainfluenza  - IV solumedrol, albuterol and ipratropium Q6hrs, azithromycin daily  - Vent settings adjusted yesterday due to auto PEEP, follow up ABGs    #GI: OG tube in place, stopped feeds due to increased residuals  - ASA held due to blood clot in OG tube yesterday, no recurrence  - Switched PPI to IV, escalate to BID if more bleeding  - Resume feeds when appropriate    #Renal: ESRD on HD  - Follow up nephrology regarding need for HD  - Hyponatremia likely hypervolemic due to need for HD, will check urine and serum osms    #ID: parainfluenza+, bcx,- c/w azithromycin for COPD exacerbation started on 5/25 for total of 7 days (Day 6 of 7)    #Endocrine: FS in 240s, increase NPH as needed    #PPx: HSQ

## 2017-05-30 NOTE — PROGRESS NOTE ADULT - SUBJECTIVE AND OBJECTIVE BOX
Patient is a 82y old  Male who presents with a chief complaint of SOB  remains paralyzed, sedated with auto peep    Any change in ROS: sedated     MEDICATIONS  (STANDING):  atorvastatin 40milliGRAM(s) Oral at bedtime  montelukast 10milliGRAM(s) Oral daily  ascorbic acid 500milliGRAM(s) Oral daily  levothyroxine 75MICROGram(s) Oral daily  azithromycin  IVPB  IV Intermittent   azithromycin  IVPB 500milliGRAM(s) IV Intermittent every 24 hours  propofol Infusion 50MICROgram(s)/kG/Min IV Continuous <Continuous>  norepinephrine Infusion 0.03MICROgram(s)/kG/Min IV Continuous <Continuous>  heparin  Injectable 5000Unit(s) SubCutaneous every 8 hours  ipratropium 17 MICROgram(s) HFA Inhaler 1Puff(s) Inhalation every 6 hours  fentaNYL   Infusion 2MICROgram(s)/kG/Hr IV Continuous <Continuous>  chlorhexidine 4% Liquid 1Application(s) Topical daily  ALBUTerol    90 MICROgram(s) HFA Inhaler 2Puff(s) Inhalation every 6 hours  sodium chloride 3%  Inhalation 3milliLiter(s) Inhalation every 8 hours  dextrose 5%. 1000milliLiter(s) IV Continuous <Continuous>  dextrose 50% Injectable 12.5Gram(s) IV Push once  dextrose 50% Injectable 25Gram(s) IV Push once  dextrose 50% Injectable 25Gram(s) IV Push once  multivitamin   Solution 5milliLiter(s) Oral daily  methylPREDNISolone sodium succinate Injectable 40milliGRAM(s) IV Push two times a day  calcium acetate 1334milliGRAM(s) Oral three times a day with meals  senna Syrup 10milliLiter(s) Oral daily  lactulose Syrup 15Gram(s) Oral every 8 hours  insulin lispro (HumaLOG) corrective regimen sliding scale  SubCutaneous every 6 hours  pantoprazole  Injectable 40milliGRAM(s) IV Push every 12 hours  insulin NPH human recombinant 9Unit(s) SubCutaneous every 6 hours  metoclopramide Injectable 5milliGRAM(s) IV Push every 8 hours    MEDICATIONS  (PRN):  dextrose Gel 1Dose(s) Oral once PRN Blood Glucose LESS THAN 70 milliGRAM(s)/deciliter  glucagon  Injectable 1milliGRAM(s) IntraMuscular once PRN Glucose LESS THAN 70 milligrams/deciliter  acetaminophen    Suspension 650milliGRAM(s) Oral every 6 hours PRN For Temp greater than 38.5 C (101.3 F)    Vital Signs Last 24 Hrs  T(C): 37.5, Max: 37.5 (05-30 @ 09:00)  T(F): 99.5, Max: 99.5 (05-30 @ 09:00)  HR: 79 (62 - 88)  BP: 142/56 (84/45 - 169/65)  BP(mean): 76 (53 - 90)  RR: 21 (12 - 24)  SpO2: 99% (95% - 99%)  Mode: AC/ CMV (Assist Control/ Continuous Mandatory Ventilation)  RR (machine): 12  TV (machine): 550  FiO2: 40  PEEP: 8  MAP: 14  PIP: 39    I&O's Summary  I & Os for 24h ending 30 May 2017 07:00  =============================================  IN: 2108.4 ml / OUT: 3047 ml / NET: -938.6 ml    I & Os for current day (as of 30 May 2017 11:29)  =============================================  IN: 139.2 ml / OUT: 10 ml / NET: 129.2 ml        Physical Exam:   GENERAL: NAD, well-groomed, well-developed  HEENT: MEHNAZ/   Atraumatic, Normocephalic  ENMT: No tonsillar erythema, exudates, or enlargement; Moist mucous membranes, Good dentition, No lesions  NECK: Supple, No JVD, Normal thyroid  CHEST/LUNG: Clear to percussion bilaterally; No rales, rhonchi, wheezing, or rubs  CVS: Regular rate and rhythm; No murmurs, rubs, or gallops  GI: : distended  NERVOUS SYSTEM:  Alert & Oriented X3, Good concentration; Motor Strength 5/5 B/L upper and lower extremities; DTRs 2+ intact and symmetric  EXTREMITIES:  2+ edema  LYMPH: No lymphadenopathy noted  SKIN: No rashes or lesions  ENDOCRINOLOGY: No Thyromegaly  PSYCH: sedated    Labs:  ABG - ( 30 May 2017 04:30 )  pH: 7.16  /  pCO2: 69    /  pO2: 122   / HCO3: 20    / Base Excess: -4.0  /  SaO2: 96.1                                        10.8   7.79  )-----------( 82       ( 30 May 2017 03:30 )             32.9     05-30    132<L>  |  89<L>  |  59<H>  ----------------------------<  240<H>  4.8   |  20<L>  |  4.12<H>    Ca    7.0<L>      30 May 2017 03:30  Phos  8.0     05-30  Mg     2.4     05-30    TPro  5.6<L>  /  Alb  3.4  /  TBili  0.3  /  DBili  x   /  AST  8   /  ALT  11  /  AlkPhos  76  05-30    CAPILLARY BLOOD GLUCOSE  246 (30 May 2017 05:45)  248 (30 May 2017 03:30)  237 (30 May 2017 01:00)  227 (29 May 2017 23:09)  229 (29 May 2017 22:00)  137 (29 May 2017 19:51)  109 (29 May 2017 19:00)  116 (29 May 2017 18:00)  124 (29 May 2017 17:18)  124 (29 May 2017 17:00)  174 (29 May 2017 16:00)  176 (29 May 2017 15:00)  176 (29 May 2017 14:00)  187 (29 May 2017 13:00)  178 (29 May 2017 12:00)    LIVER FUNCTIONS - ( 30 May 2017 03:30 )  Alb: 3.4 g/dL / Pro: 5.6 g/dL / ALK PHOS: 76 u/L / ALT: 11 u/L / AST: 8 u/L / GGT: x           Wound culture:                05-25 @ 14:54  Organism --  Culture w/ gram stain --  Specimen Source BLOOD VENOUS    Wound culture:                05-25 @ 13:48  Organism --  Culture w/ gram stain --  Specimen Source BLOOD PERIPHERAL    Abscess culture:             05-25 @ 14:54  Organism --  Gram Stain --  Specimen Source BLOOD VENOUS    Abscess culture:             05-25 @ 13:48  Organism --  Gram Stain --  Specimen Source BLOOD PERIPHERAL      Tissue culture:           05-25 @ 14:54  Organism --  Gram Stain --  Specimen Source BLOOD VENOUS    Tissue culture:           05-25 @ 13:48  Organism --  Gram Stain --  Specimen Source BLOOD PERIPHERAL    Body Fluid Smear & Culture:                        05-25 @ 14:54  AFB Smear  --  Culture Acid Fast Body Fluid w/ Smear  --  Culture Acid Fast Smear Concentrated   --    Culture Results:     --  Specimen Source BLOOD VENOUS    Body Fluid Smear & Culture:                        05-25 @ 13:48  AFB Smear  --  Culture Acid Fast Body Fluid w/ Smear  --  Culture Acid Fast Smear Concentrated   --    Culture Results:     --  Specimen Source BLOOD PERIPHERAL      Rapid Respiratory Viral Panel Result        05-25 @ 17:00  Rapid RVP --  Parainfluenza 3 POSITIVE          Studies  Chest X-RAYFINDINGS:     Cardiac silhouette normal in size. Right inferior lateral lung excluded   from film. Endotracheal tube tip at thoracic inlet. Endotracheal tube tip   courses off film, not visualized below lower heart. Visualized lungs are   clear. No pneumothorax. Old left-sided rib fractures.    IMPRESSION:   Visualized lungs are clear.  CT SCAN Chest   CT Abdomen  Venous Dopplers: LE:   Others

## 2017-05-30 NOTE — PROGRESS NOTE ADULT - ASSESSMENT
83 y/o male PMHx of DM, HTN, ESRD on HD, COPD, CAD  p/w SOB/tachypnea-COPD exacerbation 81 y/o male PMHx of DM, HTN, ESRD on HD, COPD, CAD  p/w SOB/tachypnea-COPD exacerbation  Respiratory acidosis

## 2017-05-30 NOTE — PROGRESS NOTE ADULT - PROBLEM SELECTOR PLAN 2
cont full vent support: check ABG: detailed discussion with MICU team   cont to be on steroids and bronchodilators

## 2017-05-30 NOTE — PROGRESS NOTE ADULT - ASSESSMENT
81 yo M with a PMH ESRD on HD MWF, CAD, HTN, DM, COPD (supposed to be on home O2 but pt does not use, also likely HANY), diastolic CHF (EF 65%), HLD, anemia of chronic disease, ?"liver problems" and hypothyroidism admitted for COPD exacerbation in setting of parainfluenza virus. Transferred to MICU for hypercarbic respiratory failure and then intubated for worsening respiratory acidosis on BiPAP.  #Neuro- sedated on propofol   #CV- HFpEF, no signs of volume overload, c/w ASA, holding BP meds Coreg, amlodipine and Imdur, currently on Levo to maintain while sedated   #Pulm- respiratory acidosis requiring intubation, 2/2 COPD exacerbation 2/2 parainfluenza, on  IV solumedrol, albuterol and ipratropium Q6hrs, continue with azithromycin.  #GI- OG tube in place, c/w home protonix   #Renal- ESRD on HD, HD per nephrology due Monday.  #ID- parainfluenza +, sent bcx, c/w azithromycin for COPD exacerbation started on 5/25 for total of 7 days (Day 5 of 7).  #Endocrine- DM2 currently on NPH, hgA1c 7.8, continue home levothyroxine.  #PPx- HSQ

## 2017-05-30 NOTE — PROGRESS NOTE ADULT - PROBLEM SELECTOR PLAN 5
likely multifactorial: mostly intubated with resp failure and sedation. Cont to maintain MAP> 65 mm Hg

## 2017-05-30 NOTE — PROGRESS NOTE ADULT - PROBLEM SELECTOR PLAN 1
HD MWF.  next HD in AM HD MWF.  Will get extra Rx today for fluid removal and also to help with the acidosis. Majority however is respiratory in nature.  Repeat ABG 3 hours p HD

## 2017-05-30 NOTE — PROGRESS NOTE ADULT - SUBJECTIVE AND OBJECTIVE BOX
Dr. Stacia Parnell  NEPHROLOGY-[facility]        Patient seen and examined @ bedside.  Remains intubated/sedated        MEDICATIONS  (STANDING):  atorvastatin 40milliGRAM(s) Oral at bedtime  montelukast 10milliGRAM(s) Oral daily  ascorbic acid 500milliGRAM(s) Oral daily  levothyroxine 75MICROGram(s) Oral daily  propofol Infusion 50MICROgram(s)/kG/Min IV Continuous <Continuous>  norepinephrine Infusion 0.03MICROgram(s)/kG/Min IV Continuous <Continuous>  heparin  Injectable 5000Unit(s) SubCutaneous every 8 hours  ipratropium 17 MICROgram(s) HFA Inhaler 1Puff(s) Inhalation every 6 hours  fentaNYL   Infusion 2MICROgram(s)/kG/Hr IV Continuous <Continuous>  chlorhexidine 4% Liquid 1Application(s) Topical daily  ALBUTerol    90 MICROgram(s) HFA Inhaler 2Puff(s) Inhalation every 6 hours  sodium chloride 3%  Inhalation 3milliLiter(s) Inhalation every 8 hours  dextrose 5%. 1000milliLiter(s) IV Continuous <Continuous>  dextrose 50% Injectable 12.5Gram(s) IV Push once  dextrose 50% Injectable 25Gram(s) IV Push once  dextrose 50% Injectable 25Gram(s) IV Push once  multivitamin   Solution 5milliLiter(s) Oral daily  methylPREDNISolone sodium succinate Injectable 40milliGRAM(s) IV Push two times a day  calcium acetate 1334milliGRAM(s) Oral three times a day with meals  senna Syrup 10milliLiter(s) Oral daily  insulin lispro (HumaLOG) corrective regimen sliding scale  SubCutaneous every 6 hours  pantoprazole  Injectable 40milliGRAM(s) IV Push every 12 hours  insulin NPH human recombinant 9Unit(s) SubCutaneous every 6 hours  metoclopramide Injectable 5milliGRAM(s) IV Push every 8 hours  cisatracurium Infusion 2MICROgram(s)/kG/Min IV Continuous <Continuous>  erythromycin   IVPB  IV Intermittent   lactulose Syrup 30Gram(s) Oral every 8 hours      VITAL:  T(C): , Max: 37.5 (05-30 @ 09:00)  T(F): , Max: 99.5 (05-30 @ 09:00)  HR: 78  BP: 123/49  BP(mean): 66  RR: 12  SpO2: 99%  Wt(kg): --    I and O's:  I & Os for 24h ending 05-30 @ 07:00  =============================================  IN: 2108.4 ml / OUT: 3047 ml / NET: -938.6 ml    I & Os for current day (as of 05-30 @ 13:44)  =============================================  IN: 438.7 ml / OUT: 10 ml / NET: 428.7 ml        PHYSICAL EXAM:    Constitutional: Intubated  HEENT: PERRLA    Neck:  No JVD  Respiratory: decreased breath sounds  Cardiovascular: S1 and S2  Gastrointestinal: BS+, soft, NT/ND  Extremities: No peripheral edema  : Gar  Skin: No rashes  Access: Left radial AVF +thrill    LABS:                        10.8   7.79  )-----------( 82       ( 30 May 2017 03:30 )             32.9     05-30    132<L>  |  89<L>  |  59<H>  ----------------------------<  240<H>  4.8   |  20<L>  |  4.12<H>    Ca    7.0<L>      30 May 2017 03:30  Phos  8.0     05-30  Mg     2.4     05-30    TPro  5.6<L>  /  Alb  3.4  /  TBili  0.3  /  DBili  x   /  AST  8   /  ALT  11  /  AlkPhos  76  05-30          Urine Studies:    Osmolality, Random Urine: 307 mosmo/kg (05-29 @ 22:00)  Sodium, Random Urine: < 20 meq/L (05-29 @ 22:00)        RADIOLOGY & ADDITIONAL STUDIES:        ASSESSMENT      RECOMMENDATIONS Dr. Stacia Parnell  NEPHROLOGY-[facility]        Patient seen and examined @ bedside.  Remains intubated/sedated and paralyzed        MEDICATIONS  (STANDING):  atorvastatin 40milliGRAM(s) Oral at bedtime  montelukast 10milliGRAM(s) Oral daily  ascorbic acid 500milliGRAM(s) Oral daily  levothyroxine 75MICROGram(s) Oral daily  propofol Infusion 50MICROgram(s)/kG/Min IV Continuous <Continuous>  norepinephrine Infusion 0.03MICROgram(s)/kG/Min IV Continuous <Continuous>  heparin  Injectable 5000Unit(s) SubCutaneous every 8 hours  ipratropium 17 MICROgram(s) HFA Inhaler 1Puff(s) Inhalation every 6 hours  fentaNYL   Infusion 2MICROgram(s)/kG/Hr IV Continuous <Continuous>  chlorhexidine 4% Liquid 1Application(s) Topical daily  ALBUTerol    90 MICROgram(s) HFA Inhaler 2Puff(s) Inhalation every 6 hours  sodium chloride 3%  Inhalation 3milliLiter(s) Inhalation every 8 hours  dextrose 5%. 1000milliLiter(s) IV Continuous <Continuous>  dextrose 50% Injectable 12.5Gram(s) IV Push once  dextrose 50% Injectable 25Gram(s) IV Push once  dextrose 50% Injectable 25Gram(s) IV Push once  multivitamin   Solution 5milliLiter(s) Oral daily  methylPREDNISolone sodium succinate Injectable 40milliGRAM(s) IV Push two times a day  calcium acetate 1334milliGRAM(s) Oral three times a day with meals  senna Syrup 10milliLiter(s) Oral daily  insulin lispro (HumaLOG) corrective regimen sliding scale  SubCutaneous every 6 hours  pantoprazole  Injectable 40milliGRAM(s) IV Push every 12 hours  insulin NPH human recombinant 9Unit(s) SubCutaneous every 6 hours  metoclopramide Injectable 5milliGRAM(s) IV Push every 8 hours  cisatracurium Infusion 2MICROgram(s)/kG/Min IV Continuous <Continuous>  erythromycin   IVPB  IV Intermittent   lactulose Syrup 30Gram(s) Oral every 8 hours      VITAL:  T(C): , Max: 37.5 (05-30 @ 09:00)  T(F): , Max: 99.5 (05-30 @ 09:00)  HR: 78  BP: 123/49  BP(mean): 66  RR: 12  SpO2: 99%  Wt(kg): --    I and O's:  I & Os for 24h ending 05-30 @ 07:00  =============================================  IN: 2108.4 ml / OUT: 3047 ml / NET: -938.6 ml    I & Os for current day (as of 05-30 @ 13:44)  =============================================  IN: 438.7 ml / OUT: 10 ml / NET: 428.7 ml        PHYSICAL EXAM:    Constitutional: Intubated n sedated  HEENT: PERRLA    Neck:  No JVD  Respiratory: decreased breath sounds  Cardiovascular: S1 and S2  Gastrointestinal: BS+, soft, NT/ND  Extremities: No peripheral edema  : Gar  Skin: No rashes  Access: Left radial AVF +thrill    LABS:                        10.8   7.79  )-----------( 82       ( 30 May 2017 03:30 )             32.9     05-30    132<L>  |  89<L>  |  59<H>  ----------------------------<  240<H>  4.8   |  20<L>  |  4.12<H>    Ca    7.0<L>      30 May 2017 03:30  Phos  8.0     05-30  Mg     2.4     05-30    TPro  5.6<L>  /  Alb  3.4  /  TBili  0.3  /  DBili  x   /  AST  8   /  ALT  11  /  AlkPhos  76  05-30          Urine Studies:    Osmolality, Random Urine: 307 mosmo/kg (05-29 @ 22:00)  Sodium, Random Urine: < 20 meq/L (05-29 @ 22:00)

## 2017-05-30 NOTE — PROGRESS NOTE ADULT - SUBJECTIVE AND OBJECTIVE BOX
CHIEF COMPLAINT: Dyspnea    Interval Events: Pt received HD yesterday with improvement of hyponatremia and hyperkalemia. Feeds were started yesterday but were stopped as residuals were high despite reglan. Fingertsicks in 200s on NPH 6u q6h. FS in 200s.     REVIEW OF SYSTEMS:  Constitutional:   Eyes:  ENT:  CV:  Resp:  GI:  :  MSK:  Integumentary:  Neurological:  Psychiatric:  Endocrine:  Hematologic/Lymphatic:  Allergic/Immunologic:  [ ] All other systems negative  [ x ] Unable to assess ROS because pt intubated    OBJECTIVE:  ICU Vital Signs Last 24 Hrs  T(C): 36.2, Max: 36.2 (05-29 @ 08:00)  T(F): 97.1, Max: 97.1 (05-29 @ 08:00)  HR: 85 (62 - 85)  BP: 126/58 (89/46 - 169/65)  BP(mean): 74 (56 - 90)  ABP: --  ABP(mean): --  RR: 24 (16 - 24)  SpO2: 96% (95% - 99%)    Mode: AC/ CMV (Assist Control/ Continuous Mandatory Ventilation), RR (machine): 20, TV (machine): 450, FiO2: 40, PEEP: 8, MAP: 12, PIP: 32  I & Os for 24h ending 05-29 @ 07:00  =============================================  IN: 3789.3 ml / OUT: 59 ml / NET: 3730.3 ml    I & Os for current day (as of 05-30 @ 06:52)  =============================================  IN: 2108.4 ml / OUT: 3047 ml / NET: -938.6 ml    CAPILLARY BLOOD GLUCOSE  246 (30 May 2017 05:45)      PHYSICAL EXAM:  General: intubated, sedated  HEENT: L pupil irregular, sluggish b/l  Lymph Nodes:  Neck: no JVD or thyromegaly  Respiratory: rhonchi  Cardiovascular: RRR, no murmurs  Abdomen: distended  Extremities: no pedal edema  Skin: warm and dry  Neurological: sedated  Psychiatry:    HOSPITAL MEDICATIONS:  MEDICATIONS  (STANDING):  atorvastatin 40milliGRAM(s) Oral at bedtime  montelukast 10milliGRAM(s) Oral daily  ascorbic acid 500milliGRAM(s) Oral daily  levothyroxine 75MICROGram(s) Oral daily  azithromycin  IVPB  IV Intermittent   azithromycin  IVPB 500milliGRAM(s) IV Intermittent every 24 hours  propofol Infusion 50MICROgram(s)/kG/Min IV Continuous <Continuous>  norepinephrine Infusion 0.03MICROgram(s)/kG/Min IV Continuous <Continuous>  heparin  Injectable 5000Unit(s) SubCutaneous every 8 hours  ipratropium 17 MICROgram(s) HFA Inhaler 1Puff(s) Inhalation every 6 hours  fentaNYL   Infusion 2MICROgram(s)/kG/Hr IV Continuous <Continuous>  chlorhexidine 4% Liquid 1Application(s) Topical daily  ALBUTerol    90 MICROgram(s) HFA Inhaler 2Puff(s) Inhalation every 6 hours  sodium chloride 3%  Inhalation 3milliLiter(s) Inhalation every 8 hours  dextrose 5%. 1000milliLiter(s) IV Continuous <Continuous>  dextrose 50% Injectable 12.5Gram(s) IV Push once  dextrose 50% Injectable 25Gram(s) IV Push once  dextrose 50% Injectable 25Gram(s) IV Push once  multivitamin   Solution 5milliLiter(s) Oral daily  methylPREDNISolone sodium succinate Injectable 40milliGRAM(s) IV Push two times a day  calcium acetate 1334milliGRAM(s) Oral three times a day with meals  senna Syrup 10milliLiter(s) Oral daily  cisatracurium Infusion 2MICROgram(s)/kG/Min IV Continuous <Continuous>  pantoprazole  Injectable 40milliGRAM(s) IV Push daily  lactulose Syrup 15Gram(s) Oral every 8 hours  insulin NPH human recombinant 6Unit(s) SubCutaneous every 6 hours  insulin lispro (HumaLOG) corrective regimen sliding scale  SubCutaneous every 6 hours  metoclopramide Injectable 10milliGRAM(s) IV Push every 8 hours    MEDICATIONS  (PRN):  dextrose Gel 1Dose(s) Oral once PRN Blood Glucose LESS THAN 70 milliGRAM(s)/deciliter  glucagon  Injectable 1milliGRAM(s) IntraMuscular once PRN Glucose LESS THAN 70 milligrams/deciliter  acetaminophen    Suspension 650milliGRAM(s) Oral every 6 hours PRN For Temp greater than 38.5 C (101.3 F)      LABS:  (05-30 @ 03:30)                        10.8  7.79 )-----------( 82                 32.9    Neutrophils = 6.95 (89.2%)  Lymphocytes = 0.48 (6.2%)  Eosinophils = 0.00 (0.0%)  Basophils = 0.01 (0.1%)  Monocytes = 0.30 (3.9%)  Bands = 0%    WBC Trend: 7.79<--, 6.42<--, 6.13<--  Hb Trend: 10.8<--, 10.2<--, 10.3<--, 11.1<--, 11.2<--  Plt Trend: 82<--, 98<--, 80<--, 85<--, 89<--  05-30    132<L>  |  89<L>  |  59<H>  ----------------------------<  240<H>  4.8   |  20<L>  |  4.12<H>    Ca    7.0<L>      30 May 2017 03:30  Phos  8.0     05-30  Mg     2.4     05-30    TPro  5.6<L>  /  Alb  3.4  /  TBili  0.3  /  DBili  x   /  AST  8   /  ALT  11  /  AlkPhos  76  05-30    Creatinine Trend: 4.12<--, 5.02<--, 5.02<--, 4.97<--, 4.71<--, 3.54<--      Arterial Blood Gas:  05-30 @ 04:30  7.16/69/122/20/96.1/-4.0  ABG lactate: --  Arterial Blood Gas:  05-29 @ 14:33  7.18/64/103/20/96.6/-4.3  ABG lactate: 0.9  Arterial Blood Gas:  05-29 @ 10:45  7.11/76/84/19/93.9/-5.1  ABG lactate: 1.1  Arterial Blood Gas:  05-29 @ 05:00  7.17/63/104/19/97.0/-5.2  ABG lactate: 1.3  Arterial Blood Gas:  05-28 @ 23:00  7.17/70/117/20/97.2/-3.3  ABG lactate: 1.2  Arterial Blood Gas:  05-28 @ 19:00  7.14/71/52/19/77.4/-5.2  ABG lactate: 1.2  Arterial Blood Gas:  05-28 @ 12:45  7.18/70/89/21/95.7/-2.1  ABG lactate: --            MICROBIOLOGY:   Blood Cx:  Urine Cx:  Sputum Cx:  Legionella:  RVP:    RADIOLOGY:  X Ray:  CT:  MRI:  Ultrasound:  [ ] Reviewed and interpreted by me    EKG:

## 2017-05-30 NOTE — PROGRESS NOTE ADULT - PROBLEM SELECTOR PLAN 1
on iv steroids with bronchodilators: severely acidotic: with permissive hypercapnia: the PIP is  still high. Remains paralyzed and sedated.  On D6/7 of antibiotics  Still persistently hypercarbic with severe acidosis: high peak air way pressures, not able to correct with various ventilator settings: cont MICU  care

## 2017-05-31 NOTE — PROGRESS NOTE ADULT - PROBLEM SELECTOR PLAN 1
HD MWF.  s/p extra tx yesterday  scheduled for his regular HD today HD MWF.  s/p extra tx yesterday  scheduled for his regular HD tomorrow.  No need for HD today

## 2017-05-31 NOTE — PROGRESS NOTE ADULT - ASSESSMENT
81 y/o male PMHx of DM, HTN, ESRD on HD, COPD, CAD  p/w SOB/tachypnea-COPD exacerbation  Respiratory acidosis

## 2017-05-31 NOTE — PROGRESS NOTE ADULT - PROBLEM SELECTOR PLAN 3
Severe acidosis: mostly hypercarbic, with metabolic on full vent support: To cont current meds  and mechanical vent support

## 2017-05-31 NOTE — PROGRESS NOTE ADULT - SUBJECTIVE AND OBJECTIVE BOX
Dr. Stacia Parnell  NEPHROLOGY-[facility] [phone]        Patient seen and examined @ bedside.  Still intubated/sedated/paralyzed        MEDICATIONS  (STANDING):  atorvastatin 40milliGRAM(s) Oral at bedtime  montelukast 10milliGRAM(s) Oral daily  ascorbic acid 500milliGRAM(s) Oral daily  levothyroxine 75MICROGram(s) Oral daily  propofol Infusion 50MICROgram(s)/kG/Min IV Continuous <Continuous>  norepinephrine Infusion 0.03MICROgram(s)/kG/Min IV Continuous <Continuous>  heparin  Injectable 5000Unit(s) SubCutaneous every 8 hours  ipratropium 17 MICROgram(s) HFA Inhaler 1Puff(s) Inhalation every 6 hours  chlorhexidine 4% Liquid 1Application(s) Topical daily  ALBUTerol    90 MICROgram(s) HFA Inhaler 2Puff(s) Inhalation every 6 hours  sodium chloride 3%  Inhalation 3milliLiter(s) Inhalation every 8 hours  dextrose 5%. 1000milliLiter(s) IV Continuous <Continuous>  dextrose 50% Injectable 12.5Gram(s) IV Push once  dextrose 50% Injectable 25Gram(s) IV Push once  dextrose 50% Injectable 25Gram(s) IV Push once  multivitamin   Solution 5milliLiter(s) Oral daily  methylPREDNISolone sodium succinate Injectable 40milliGRAM(s) IV Push two times a day  calcium acetate 1334milliGRAM(s) Oral three times a day with meals  senna Syrup 10milliLiter(s) Oral daily  insulin lispro (HumaLOG) corrective regimen sliding scale  SubCutaneous every 6 hours  pantoprazole  Injectable 40milliGRAM(s) IV Push every 12 hours  insulin NPH human recombinant 9Unit(s) SubCutaneous every 6 hours  cisatracurium Infusion 1MICROgram(s)/kG/Min IV Continuous <Continuous>  lactulose Syrup 30Gram(s) Oral every 8 hours  sodium bicarbonate 650milliGRAM(s) Oral every 8 hours  sevelamer hydrochloride 1600milliGRAM(s) Oral every 8 hours  midazolam Infusion 3mG/Hr IV Continuous <Continuous>  sorbitol 70%/mineral oil/magnesium hydroxide/glycerin Enema 120milliLiter(s) Rectal once      VITAL:  T(C): , Max: 36.6 (05-31 @ 08:00)  T(F): , Max: 97.8 (05-31 @ 08:00)  HR: 79  BP: 143/66  BP(mean): 84  RR: 17  SpO2: 98%  Wt(kg): --    I and O's:  I & Os for 24h ending 05-31 @ 07:00  =============================================  IN: 1933 ml / OUT: 1935 ml / NET: -2 ml    I & Os for current day (as of 05-31 @ 10:40)  =============================================  IN: 90.5 ml / OUT: 5 ml / NET: 85.5 ml        PHYSICAL EXAM:    Constitutional: Intubated/sedated  HEENT: PERRLA    Neck:  No JVD  Respiratory: decreased breath sounds  Cardiovascular: S1 and S2  Gastrointestinal: BS+,Distended  Extremities: No peripheral edema  Neurological: AOX 0 2/2 intubation  : + hameed  Skin: No rashes  Access: L AVF +thrill    LABS:                        11.0   8.09  )-----------( 84       ( 31 May 2017 03:27 )             32.5     05-31    138  |  94<L>  |  43<H>  ----------------------------<  127<H>  4.4   |  22  |  2.99<H>    Ca    7.4<L>      31 May 2017 03:27  Phos  6.6     05-31  Mg     2.3     05-31    TPro  5.7<L>  /  Alb  3.2<L>  /  TBili  0.4  /  DBili  x   /  AST  13  /  ALT  10  /  AlkPhos  72  05-31          Urine Studies:    Osmolality, Random Urine: 307 mosmo/kg (05-29 @ 22:00)  Sodium, Random Urine: < 20 meq/L (05-29 @ 22:00)        RADIOLOGY & ADDITIONAL STUDIES:    EXAM:  RAD ABDOMEN (1 VIEW)        PROCEDURE DATE:  May 30 2017         INTERPRETATION:  TIME OF EXAM: May 30, 2017 at 1:49 PM    CLINICAL INFORMATION: Abdominal distention    TECHNIQUE:   Portable supine abdomen    INTERPRETATION:     An enteric tube is seen with its tip in the stomach. There is a   nonobstructive intestinal gas pattern. No pathologic calcifications.      COMPARISON:  None available      IMPRESSION:  No localized abdominal disease. Enteric tube in place. Dr. Stacia Parnell  NEPHROLOGY-[facility] [phone]        Patient seen and examined @ bedside.  Still intubated/sedated/paralyzed.  Distended abd.          MEDICATIONS  (STANDING):  atorvastatin 40milliGRAM(s) Oral at bedtime  montelukast 10milliGRAM(s) Oral daily  ascorbic acid 500milliGRAM(s) Oral daily  levothyroxine 75MICROGram(s) Oral daily  propofol Infusion 50MICROgram(s)/kG/Min IV Continuous <Continuous>  norepinephrine Infusion 0.03MICROgram(s)/kG/Min IV Continuous <Continuous>  heparin  Injectable 5000Unit(s) SubCutaneous every 8 hours  ipratropium 17 MICROgram(s) HFA Inhaler 1Puff(s) Inhalation every 6 hours  chlorhexidine 4% Liquid 1Application(s) Topical daily  ALBUTerol    90 MICROgram(s) HFA Inhaler 2Puff(s) Inhalation every 6 hours  sodium chloride 3%  Inhalation 3milliLiter(s) Inhalation every 8 hours  dextrose 5%. 1000milliLiter(s) IV Continuous <Continuous>  dextrose 50% Injectable 12.5Gram(s) IV Push once  dextrose 50% Injectable 25Gram(s) IV Push once  dextrose 50% Injectable 25Gram(s) IV Push once  multivitamin   Solution 5milliLiter(s) Oral daily  methylPREDNISolone sodium succinate Injectable 40milliGRAM(s) IV Push two times a day  calcium acetate 1334milliGRAM(s) Oral three times a day with meals  senna Syrup 10milliLiter(s) Oral daily  insulin lispro (HumaLOG) corrective regimen sliding scale  SubCutaneous every 6 hours  pantoprazole  Injectable 40milliGRAM(s) IV Push every 12 hours  insulin NPH human recombinant 9Unit(s) SubCutaneous every 6 hours  cisatracurium Infusion 1MICROgram(s)/kG/Min IV Continuous <Continuous>  lactulose Syrup 30Gram(s) Oral every 8 hours  sodium bicarbonate 650milliGRAM(s) Oral every 8 hours  sevelamer hydrochloride 1600milliGRAM(s) Oral every 8 hours  midazolam Infusion 3mG/Hr IV Continuous <Continuous>  sorbitol 70%/mineral oil/magnesium hydroxide/glycerin Enema 120milliLiter(s) Rectal once      VITAL:  T(C): , Max: 36.6 (05-31 @ 08:00)  T(F): , Max: 97.8 (05-31 @ 08:00)  HR: 79  BP: 143/66  BP(mean): 84  RR: 17  SpO2: 98%  Wt(kg): --    I and O's:  I & Os for 24h ending 05-31 @ 07:00  =============================================  IN: 1933 ml / OUT: 1935 ml / NET: -2 ml    I & Os for current day (as of 05-31 @ 10:40)  =============================================  IN: 90.5 ml / OUT: 5 ml / NET: 85.5 ml        PHYSICAL EXAM:    Constitutional: Intubated/sedated  HEENT: PERRLA    Neck:  No JVD  Respiratory: decreased breath sounds  Cardiovascular: S1 and S2  Gastrointestinal: BS+,Distended  Extremities: No peripheral edema  Neurological: AOX 0 2/2 intubation  : + hameed  Skin: No rashes  Access: L AVF +thrill    LABS:                        11.0   8.09  )-----------( 84       ( 31 May 2017 03:27 )             32.5     05-31    138  |  94<L>  |  43<H>  ----------------------------<  127<H>  4.4   |  22  |  2.99<H>    Ca    7.4<L>      31 May 2017 03:27  Phos  6.6     05-31  Mg     2.3     05-31    TPro  5.7<L>  /  Alb  3.2<L>  /  TBili  0.4  /  DBili  x   /  AST  13  /  ALT  10  /  AlkPhos  72  05-31          Urine Studies:    Osmolality, Random Urine: 307 mosmo/kg (05-29 @ 22:00)  Sodium, Random Urine: < 20 meq/L (05-29 @ 22:00)        RADIOLOGY & ADDITIONAL STUDIES:    EXAM:  RAD ABDOMEN (1 VIEW)        PROCEDURE DATE:  May 30 2017         INTERPRETATION:  TIME OF EXAM: May 30, 2017 at 1:49 PM    CLINICAL INFORMATION: Abdominal distention    TECHNIQUE:   Portable supine abdomen    INTERPRETATION:     An enteric tube is seen with its tip in the stomach. There is a   nonobstructive intestinal gas pattern. No pathologic calcifications.      COMPARISON:  None available      IMPRESSION:  No localized abdominal disease. Enteric tube in place.

## 2017-05-31 NOTE — PROGRESS NOTE ADULT - PROBLEM SELECTOR PLAN 3
likely not volume related issue  still requiring pressors likely not volume related issue  still requiring pressors likely from sedation

## 2017-05-31 NOTE — PROGRESS NOTE ADULT - ASSESSMENT
82M with ESRD on HD MWF, CAD, HTN, DM, COPD (supposed to be on home O2 but pt does not use, also likely HANY), diastolic CHF (EF 65%), HLD, anemia of chronic disease, ?"liver problems" and hypothyroidism admitted for COPD exacerbation in setting of parainfluenza virus. Transferred to MICU for hypercarbic respiratory failure and intubated, remains acidotic and hypercarbic    #Neuro- sedated on propofol     #CV: requiring minimal dose of levo, attempting to wean  - HFpEF: ASA held due to bleeding oropharyngeal bleeding, holding BP meds Coreg, amlodipine and Imdur    #Pulm: intubated with hypercarbia, mixed respiratory and metabolic acidosis requiring intubation due to COPD exacerbation in setting of parainfluenza    - IV solumedrol, albuterol and ipratropium Q6hrs, azithromycin daily  - Vent settings adjusted yesterday due to auto PEEP, follow up ABGs    #GI: OG tube in place, stopped feeds due to increased residuals  - ASA held due to blood clot in OG tube, no recurrence  - Switched PPI to IV, escalate to BID if more bleeding  - Resume feeds when appropriate    #Renal: ESRD on HD  - Follow up nephrology regarding need for HD  - Hyponatremia likely hypervolemic due to need for HD, will check urine and serum osms    #ID: parainfluenza+, bcx,- , Completed course of azithromycin.    #Endocrine: FS  well controlled, continue NPH.    #PPx: HSQ 82M with ESRD on HD MWF, CAD, HTN, DM, COPD (supposed to be on home O2 but pt does not use, also likely HANY), diastolic CHF (EF 65%), HLD, anemia of chronic disease, ?"liver problems" and hypothyroidism admitted for COPD exacerbation in setting of parainfluenza virus. Transferred to MICU for hypercarbic respiratory failure and intubated, remains acidotic and hypercarbic    #Neuro- sedated on propofol     #CV: requiring minimal dose of levo, attempting to wean  - HFpEF: ASA held due to bleeding oropharyngeal bleeding, holding BP meds Coreg, amlodipine and Imdur    #Pulm: intubated with hypercarbia, mixed respiratory and metabolic acidosis requiring intubation due to COPD exacerbation in setting of parainfluenza    - IV solumedrol, albuterol and ipratropium Q6hrs. Completed course of azithromycin daily  - Will trend ABGs.    #GI: OG tube in place, stopped feeds due to increased residuals  - ASA held due to blood clot in OG tube, no recurrence  - Switched PPI to IV, escalate to BID if more bleeding  - Resume feeds when appropriate  - Will manually disimpact today.    #Renal: ESRD on HD  - Follow up nephrology regarding need for HD    #ID: parainfluenza+, bcx,- , Completed course of azithromycin.    #Endocrine: FS  well controlled, continue NPH.    #PPx: HSQ

## 2017-05-31 NOTE — PROGRESS NOTE ADULT - SUBJECTIVE AND OBJECTIVE BOX
Patient is a 82y old  Male who presents with a chief complaint of sob  Intubated and sedated on pressors     Any change in ROS: cant obtain     MEDICATIONS  (STANDING):  atorvastatin 40milliGRAM(s) Oral at bedtime  montelukast 10milliGRAM(s) Oral daily  ascorbic acid 500milliGRAM(s) Oral daily  levothyroxine 75MICROGram(s) Oral daily  propofol Infusion 50MICROgram(s)/kG/Min IV Continuous <Continuous>  norepinephrine Infusion 0.03MICROgram(s)/kG/Min IV Continuous <Continuous>  heparin  Injectable 5000Unit(s) SubCutaneous every 8 hours  ipratropium 17 MICROgram(s) HFA Inhaler 1Puff(s) Inhalation every 6 hours  chlorhexidine 4% Liquid 1Application(s) Topical daily  ALBUTerol    90 MICROgram(s) HFA Inhaler 2Puff(s) Inhalation every 6 hours  sodium chloride 3%  Inhalation 3milliLiter(s) Inhalation every 8 hours  dextrose 5%. 1000milliLiter(s) IV Continuous <Continuous>  dextrose 50% Injectable 12.5Gram(s) IV Push once  dextrose 50% Injectable 25Gram(s) IV Push once  dextrose 50% Injectable 25Gram(s) IV Push once  multivitamin   Solution 5milliLiter(s) Oral daily  methylPREDNISolone sodium succinate Injectable 40milliGRAM(s) IV Push two times a day  calcium acetate 1334milliGRAM(s) Oral three times a day with meals  senna Syrup 10milliLiter(s) Oral daily  insulin lispro (HumaLOG) corrective regimen sliding scale  SubCutaneous every 6 hours  pantoprazole  Injectable 40milliGRAM(s) IV Push every 12 hours  insulin NPH human recombinant 9Unit(s) SubCutaneous every 6 hours  cisatracurium Infusion 1MICROgram(s)/kG/Min IV Continuous <Continuous>  lactulose Syrup 30Gram(s) Oral every 8 hours  sodium bicarbonate 650milliGRAM(s) Oral every 8 hours  sevelamer hydrochloride 1600milliGRAM(s) Oral every 8 hours  midazolam Infusion 3mG/Hr IV Continuous <Continuous>  sorbitol 70%/mineral oil/magnesium hydroxide/glycerin Enema 120milliLiter(s) Rectal once    MEDICATIONS  (PRN):  dextrose Gel 1Dose(s) Oral once PRN Blood Glucose LESS THAN 70 milliGRAM(s)/deciliter  glucagon  Injectable 1milliGRAM(s) IntraMuscular once PRN Glucose LESS THAN 70 milligrams/deciliter  acetaminophen    Suspension 650milliGRAM(s) Oral every 6 hours PRN For Temp greater than 38.5 C (101.3 F)    Vital Signs Last 24 Hrs  T(C): 36.9, Max: 36.9 (05-31 @ 12:00)  T(F): 98.5, Max: 98.5 (05-31 @ 12:00)  HR: 80 (65 - 87)  BP: 132/64 (87/50 - 153/60)  BP(mean): 78 (54 - 87)  RR: 17 (12 - 19)  SpO2: 100% (97% - 100%)  Mode: AC/ CMV (Assist Control/ Continuous Mandatory Ventilation)  RR (machine): 17  TV (machine): 550  FiO2: 40  PEEP: 5  MAP: 12.4  PIP: 40    I&O's Summary  I & Os for 24h ending 31 May 2017 07:00  =============================================  IN: 1933 ml / OUT: 1935 ml / NET: -2 ml    I & Os for current day (as of 31 May 2017 13:55)  =============================================  IN: 179.8 ml / OUT: 10 ml / NET: 169.8 ml        Physical Exam:   GENERAL: NAD, well-groomed, well-developed  HEENT: MEHNAZ/   Atraumatic, Normocephalic  ENMT: No tonsillar erythema, exudates, or enlargement; Moist mucous membranes, Good dentition, No lesions  NECK: Supple, No JVD, Normal thyroid  CHEST/LUNG: expiratory coarse rhonchi   GI: Distended   CVS: s1 s2 normal   NERVOUS SYSTEM:  Alert & Oriented X3, Good concentration; Motor Strength 5/5 B/L upper and lower extremities; DTRs 2+ intact and symmetric  EXTREMITIES:  2+ Peripheral Pulses, No clubbing, cyanosis, or edema  LYMPH: No lymphadenopathy noted  SKIN: No rashes or lesions  ENDOCRINOLOGY: No Thyromegaly  PSYCH: sedated     Labs:  ABG - ( 31 May 2017 13:27 )  pH: 7.27  /  pCO2: 51    /  pO2: 52    / HCO3: 21    / Base Excess: -3.4  /  SaO2: 83.3                          11.0   8.09  )-----------( 84       ( 31 May 2017 03:27 )             32.5     05-31    138  |  94<L>  |  43<H>  ----------------------------<  127<H>  4.4   |  22  |  2.99<H>    Ca    7.4<L>      31 May 2017 03:27  Phos  6.6     05-31  Mg     2.3     05-31    TPro  5.7<L>  /  Alb  3.2<L>  /  TBili  0.4  /  DBili  x   /  AST  13  /  ALT  10  /  AlkPhos  72  05-31    CAPILLARY BLOOD GLUCOSE  164 (31 May 2017 11:00)  142 (31 May 2017 05:53)  129 (31 May 2017 02:14)  112 (30 May 2017 23:21)  110 (30 May 2017 21:44)  153 (30 May 2017 18:00)    LIVER FUNCTIONS - ( 31 May 2017 03:27 )  Alb: 3.2 g/dL / Pro: 5.7 g/dL / ALK PHOS: 72 u/L / ALT: 10 u/L / AST: 13 u/L / GGT: x             Wound culture:                05-25 @ 14:54  Organism --  Culture w/ gram stain --  Specimen Source BLOOD VENOUS    Wound culture:                05-25 @ 13:48  Organism --  Culture w/ gram stain --  Specimen Source BLOOD PERIPHERAL        Abscess culture:             05-25 @ 14:54  Organism --  Gram Stain --  Specimen Source BLOOD VENOUS    Abscess culture:             05-25 @ 13:48  Organism --  Gram Stain --  Specimen Source BLOOD PERIPHERAL    Tissue culture:           05-25 @ 14:54  Organism --  Gram Stain --  Specimen Source BLOOD VENOUS    Tissue culture:           05-25 @ 13:48  Organism --  Gram Stain --  Specimen Source BLOOD PERIPHERAL        Body Fluid Smear & Culture:                        05-25 @ 14:54  AFB Smear  --  Culture Acid Fast Body Fluid w/ Smear  --  Culture Acid Fast Smear Concentrated   --    Culture Results:     --  Specimen Source BLOOD VENOUS    Body Fluid Smear & Culture:                        05-25 @ 13:48  AFB Smear  --  Culture Acid Fast Body Fluid w/ Smear  --  Culture Acid Fast Smear Concentrated   --    Culture Results:     --  Specimen Source BLOOD PERIPHERAL    Rapid Respiratory Viral Panel Result        05-25 @ 17:00  Rapid RVP --  Coronovirus NOT DETECTED  Adenovirus NOT DETECTED  Bordetella Pertussis NOT DETECTED  Chlamydia Pneumonia NOT DETECTED  Entero/RhinovirusNOT DETECTED  HKU1 Coronovirus NOT DETECTED  HMPV Coronovirus NOT DETECTED  Influenza A NOT DETECTED (any subtype)  Influenza AH1 NOT DETECTED  Influenza AH1 2009 NOT DETECTED  Influenza AH3 NOT DETECTED  Influenza B NOT DETECTED  Mycoplasma Pneumoniae NOT DETECTED This nucleic acid amplification assay was performed using  the [employer]. The following pathogens are tested  for: Adenovirus, Coronavirus 229E, Coronavirus HKU1,  Coronavirus NL63, Coronavirus OC43, Human Metapneumovirus  (HMPV),  NL63 Coronovirus NOT DETECTED  OC43 Coronovirus NOT DETECTED  Parainfluenza 1 NOT DETECTED  Parainfluenza 2 NOT DETECTED  Parainfluenza 3 POSITIVE  Parainfluenza 4 NOT DETECTED  Resp Syncytial Virus NOT DETECTED        Studies  Chest X-RAYXAM:  RAD CHEST PORTABLE IMMEDIATE        PROCEDURE DATE:  May 27 2017         INTERPRETATION:  EXAMINATION: RAD CHEST PORTABLE IMMEDIATE    CLINICAL INDICATION: hypoxia hypotension    TECHNIQUE: Frontal radiograph of the chest was obtained.    COMPARISON: 5/26/2017.    FINDINGS:     Cardiac silhouette normal in size. Right inferior lateral lung excluded   from film. Endotracheal tube tip at thoracic inlet. Endotracheal tube tip   courses off film, not visualized below lower heart. Visualized lungs are   clear. No pneumothorax. Old left-sided rib fractures.    IMPRESSION:   Visualized lungs are clear.  CT SCAN Chest   CT Abdomen  Venous Dopplers: LE:   Others      IMPRESSION:    1. Status post ORIF right hip fracture.  2. Nonobstructive colonic distention.

## 2017-05-31 NOTE — PROGRESS NOTE ADULT - PROBLEM SELECTOR PLAN 2
pt w/ mixed acidosis picture.  pt now on bicarb via OGT  monitor serum HCO3 and ABG  remains intubated/sedated/paralyzed pt w/ mixed acidosis picture.  pt now on bicarb via OGT  monitor serum HCO3 and ABG  remains intubated/sedated/paralyzed  I suspect he may need a trach?

## 2017-05-31 NOTE — PROGRESS NOTE ADULT - SUBJECTIVE AND OBJECTIVE BOX
CHIEF COMPLAINT:    Interval Events: Abd X Ray: No localized abdominal disease. Enteric tube in place. HD yesterday, due for HD today as well.    REVIEW OF SYSTEMS:  Constitutional:   Eyes:  ENT:  CV:  Resp:  GI:  :  MSK:  Integumentary:  Neurological:  Psychiatric:  Endocrine:  Hematologic/Lymphatic:  Allergic/Immunologic:  [ ] All other systems negative  [x] Unable to assess ROS because Unable to assess due to intubation    OBJECTIVE:  ICU Vital Signs Last 24 Hrs  T(C): 36.1, Max: 37.5 (05-30 @ 09:00)  T(F): 97, Max: 99.5 (05-30 @ 09:00)  HR: 75 (65 - 88)  BP: 107/59 (84/45 - 153/60)  BP(mean): 71 (53 - 87)  ABP: --  ABP(mean): --  RR: 17 (12 - 22)  SpO2: 99% (95% - 100%)    Mode: AC/ CMV (Assist Control/ Continuous Mandatory Ventilation), RR (machine): 17, TV (machine): 550, FiO2: 40, PEEP: 5, MAP: 13, PIP: 39    I & Os for current day (as of 05-31 @ 07:29)  =============================================  IN: 1933 ml / OUT: 1935 ml / NET: -2 ml    CAPILLARY BLOOD GLUCOSE  142 (31 May 2017 05:53)      PHYSICAL EXAM:  General: NAD  HEENT: NCAT, Sclera nonicteric, PERRLA  Lymph Nodes: No LAD  Neck: ET tube inplace. Trachea midline, thyroid non palpable.  Respiratory:  CTAB, no wheezes, rales or rhonchi.   Cardiovascular: Normal S1, S2, no murmurs rubs or gallops  Abdomen: Soft, Nondistended, Nontender. +BS x4.  Extremities: No clubbing, cyanosis or edema.  Skin: Clean, Dry Intact.  Neurological: Intubated  Psychiatry: Intubated    HOSPITAL MEDICATIONS:  MEDICATIONS  (STANDING):  atorvastatin 40milliGRAM(s) Oral at bedtime  montelukast 10milliGRAM(s) Oral daily  ascorbic acid 500milliGRAM(s) Oral daily  levothyroxine 75MICROGram(s) Oral daily  propofol Infusion 50MICROgram(s)/kG/Min IV Continuous <Continuous>  norepinephrine Infusion 0.03MICROgram(s)/kG/Min IV Continuous <Continuous>  heparin  Injectable 5000Unit(s) SubCutaneous every 8 hours  ipratropium 17 MICROgram(s) HFA Inhaler 1Puff(s) Inhalation every 6 hours  chlorhexidine 4% Liquid 1Application(s) Topical daily  ALBUTerol    90 MICROgram(s) HFA Inhaler 2Puff(s) Inhalation every 6 hours  sodium chloride 3%  Inhalation 3milliLiter(s) Inhalation every 8 hours  dextrose 5%. 1000milliLiter(s) IV Continuous <Continuous>  dextrose 50% Injectable 12.5Gram(s) IV Push once  dextrose 50% Injectable 25Gram(s) IV Push once  dextrose 50% Injectable 25Gram(s) IV Push once  multivitamin   Solution 5milliLiter(s) Oral daily  methylPREDNISolone sodium succinate Injectable 40milliGRAM(s) IV Push two times a day  calcium acetate 1334milliGRAM(s) Oral three times a day with meals  senna Syrup 10milliLiter(s) Oral daily  insulin lispro (HumaLOG) corrective regimen sliding scale  SubCutaneous every 6 hours  pantoprazole  Injectable 40milliGRAM(s) IV Push every 12 hours  insulin NPH human recombinant 9Unit(s) SubCutaneous every 6 hours  cisatracurium Infusion 1MICROgram(s)/kG/Min IV Continuous <Continuous>  lactulose Syrup 30Gram(s) Oral every 8 hours  sodium bicarbonate 650milliGRAM(s) Oral every 8 hours  sevelamer hydrochloride 1600milliGRAM(s) Oral every 8 hours  midazolam Infusion 3mG/Hr IV Continuous <Continuous>    MEDICATIONS  (PRN):  dextrose Gel 1Dose(s) Oral once PRN Blood Glucose LESS THAN 70 milliGRAM(s)/deciliter  glucagon  Injectable 1milliGRAM(s) IntraMuscular once PRN Glucose LESS THAN 70 milligrams/deciliter  acetaminophen    Suspension 650milliGRAM(s) Oral every 6 hours PRN For Temp greater than 38.5 C (101.3 F)      LABS:                        11.0   8.09  )-----------( 84       ( 31 May 2017 03:27 )             32.5     05-31    138  |  94<L>  |  43<H>  ----------------------------<  127<H>  4.4   |  22  |  2.99<H>    Ca    7.4<L>      31 May 2017 03:27  Phos  6.6     05-31  Mg     2.3     05-31    TPro  5.7<L>  /  Alb  3.2<L>  /  TBili  0.4  /  DBili  x   /  AST  13  /  ALT  10  /  AlkPhos  72  05-31        Arterial Blood Gas:  05-31 @ 03:27  7.28/60/152/24/98.5/1.0  ABG lactate: 1.0  Arterial Blood Gas:  05-31 @ 00:50  7.23/69/135/24/98.2/1.1  ABG lactate: 1.0  Arterial Blood Gas:  05-30 @ 18:15  7.23/74/117/26/97.4/3.0  ABG lactate: 0.8  Arterial Blood Gas:  05-30 @ 10:30  7.18/63/118/20/97.9/-4.6  ABG lactate: 1.5  Arterial Blood Gas:  05-30 @ 04:30  7.16/69/122/20/96.1/-4.0  ABG lactate: --  Arterial Blood Gas:  05-29 @ 14:33  7.18/64/103/20/96.6/-4.3  ABG lactate: 0.9  Arterial Blood Gas:  05-29 @ 10:45  7.11/76/84/19/93.9/-5.1  ABG lactate: 1.1        MICROBIOLOGY:     RADIOLOGY:    Abd X ray: No localized abdominal disease. Enteric tube in place.  [ ] Reviewed and interpreted by me    EKG:    ASSESSMENT AND PLAN: CHIEF COMPLAINT: SOB    Interval Events: Abd X Ray: No localized abdominal disease. Enteric tube in place. HD yesterday, due for HD today as well.    REVIEW OF SYSTEMS:  Constitutional:   Eyes:  ENT:  CV:  Resp:  GI:  :  MSK:  Integumentary:  Neurological:  Psychiatric:  Endocrine:  Hematologic/Lymphatic:  Allergic/Immunologic:  [ ] All other systems negative  [x] Unable to assess ROS because Unable to assess due to intubation    OBJECTIVE:  ICU Vital Signs Last 24 Hrs  T(C): 36.1, Max: 37.5 (05-30 @ 09:00)  T(F): 97, Max: 99.5 (05-30 @ 09:00)  HR: 75 (65 - 88)  BP: 107/59 (84/45 - 153/60)  BP(mean): 71 (53 - 87)  ABP: --  ABP(mean): --  RR: 17 (12 - 22)  SpO2: 99% (95% - 100%)    Mode: AC/ CMV (Assist Control/ Continuous Mandatory Ventilation), RR (machine): 17, TV (machine): 550, FiO2: 40, PEEP: 5, MAP: 13, PIP: 39    I & Os for current day (as of 05-31 @ 07:29)  =============================================  IN: 1933 ml / OUT: 1935 ml / NET: -2 ml    CAPILLARY BLOOD GLUCOSE  142 (31 May 2017 05:53)      PHYSICAL EXAM:  General: NAD  HEENT: NCAT, Sclera nonicteric, PERRLA  Lymph Nodes: No LAD  Neck: ET tube inplace. Trachea midline, thyroid non palpable.  Respiratory:  CTAB, no wheezes, rales or rhonchi.   Cardiovascular: Normal S1, S2, no murmurs rubs or gallops  Abdomen: Soft, Nondistended, Nontender. +BS x4.  Extremities: No clubbing, cyanosis or edema.  Skin: Clean, Dry Intact.  Neurological: Intubated  Psychiatry: Intubated    HOSPITAL MEDICATIONS:  MEDICATIONS  (STANDING):  atorvastatin 40milliGRAM(s) Oral at bedtime  montelukast 10milliGRAM(s) Oral daily  ascorbic acid 500milliGRAM(s) Oral daily  levothyroxine 75MICROGram(s) Oral daily  propofol Infusion 50MICROgram(s)/kG/Min IV Continuous <Continuous>  norepinephrine Infusion 0.03MICROgram(s)/kG/Min IV Continuous <Continuous>  heparin  Injectable 5000Unit(s) SubCutaneous every 8 hours  ipratropium 17 MICROgram(s) HFA Inhaler 1Puff(s) Inhalation every 6 hours  chlorhexidine 4% Liquid 1Application(s) Topical daily  ALBUTerol    90 MICROgram(s) HFA Inhaler 2Puff(s) Inhalation every 6 hours  sodium chloride 3%  Inhalation 3milliLiter(s) Inhalation every 8 hours  dextrose 5%. 1000milliLiter(s) IV Continuous <Continuous>  dextrose 50% Injectable 12.5Gram(s) IV Push once  dextrose 50% Injectable 25Gram(s) IV Push once  dextrose 50% Injectable 25Gram(s) IV Push once  multivitamin   Solution 5milliLiter(s) Oral daily  methylPREDNISolone sodium succinate Injectable 40milliGRAM(s) IV Push two times a day  calcium acetate 1334milliGRAM(s) Oral three times a day with meals  senna Syrup 10milliLiter(s) Oral daily  insulin lispro (HumaLOG) corrective regimen sliding scale  SubCutaneous every 6 hours  pantoprazole  Injectable 40milliGRAM(s) IV Push every 12 hours  insulin NPH human recombinant 9Unit(s) SubCutaneous every 6 hours  cisatracurium Infusion 1MICROgram(s)/kG/Min IV Continuous <Continuous>  lactulose Syrup 30Gram(s) Oral every 8 hours  sodium bicarbonate 650milliGRAM(s) Oral every 8 hours  sevelamer hydrochloride 1600milliGRAM(s) Oral every 8 hours  midazolam Infusion 3mG/Hr IV Continuous <Continuous>    MEDICATIONS  (PRN):  dextrose Gel 1Dose(s) Oral once PRN Blood Glucose LESS THAN 70 milliGRAM(s)/deciliter  glucagon  Injectable 1milliGRAM(s) IntraMuscular once PRN Glucose LESS THAN 70 milligrams/deciliter  acetaminophen    Suspension 650milliGRAM(s) Oral every 6 hours PRN For Temp greater than 38.5 C (101.3 F)      LABS:                        11.0   8.09  )-----------( 84       ( 31 May 2017 03:27 )             32.5     05-31    138  |  94<L>  |  43<H>  ----------------------------<  127<H>  4.4   |  22  |  2.99<H>    Ca    7.4<L>      31 May 2017 03:27  Phos  6.6     05-31  Mg     2.3     05-31    TPro  5.7<L>  /  Alb  3.2<L>  /  TBili  0.4  /  DBili  x   /  AST  13  /  ALT  10  /  AlkPhos  72  05-31        Arterial Blood Gas:  05-31 @ 03:27  7.28/60/152/24/98.5/1.0  ABG lactate: 1.0  Arterial Blood Gas:  05-31 @ 00:50  7.23/69/135/24/98.2/1.1  ABG lactate: 1.0  Arterial Blood Gas:  05-30 @ 18:15  7.23/74/117/26/97.4/3.0  ABG lactate: 0.8  Arterial Blood Gas:  05-30 @ 10:30  7.18/63/118/20/97.9/-4.6  ABG lactate: 1.5  Arterial Blood Gas:  05-30 @ 04:30  7.16/69/122/20/96.1/-4.0  ABG lactate: --  Arterial Blood Gas:  05-29 @ 14:33  7.18/64/103/20/96.6/-4.3  ABG lactate: 0.9  Arterial Blood Gas:  05-29 @ 10:45  7.11/76/84/19/93.9/-5.1  ABG lactate: 1.1        MICROBIOLOGY: parainfluenza+    RADIOLOGY:    Abd X ray: No localized abdominal disease. Enteric tube in place.  [ ] Reviewed and interpreted by me    EKG:    ASSESSMENT AND PLAN:

## 2017-06-01 NOTE — PROGRESS NOTE ADULT - ASSESSMENT
81 y/o male PMHx of DM, HTN, ESRD on HD, COPD, CAD  p/w SOB/tachypnea-COPD exacerbation  Respiratory acidosis 83 y/o male PMHx of DM, HTN, ESRD on HD, COPD, CAD  p/w SOB/tachypnea-COPD exacerbation  Respiratory acidosis and respiratory failure  Sepsis-new

## 2017-06-01 NOTE — CONSULT NOTE ADULT - PROBLEM SELECTOR RECOMMENDATION 5
20 minutes spent with son at bedside.   Patient unable to participate in conversation.  Poor prognosis discussed.   Patient is DNR/DNI.   Likely elective extubation tomorrow when family arrives.   Goal is comfort.

## 2017-06-01 NOTE — PROGRESS NOTE ADULT - PROBLEM SELECTOR PLAN 2
pt w/ mixed acidosis picture.  pt now on bicarb via OGT.  ABG remains acidotic and hypercarbic  remains intubated/sedated/paralyzed.  On IV steroids as well as per MICU  I suspect he may need a trach? pt w/ mixed acidosis picture.  pt now on bicarb via OGT.  ABG remains acidotic and hypercarbic  remains intubated/sedated/paralyzed.  On IV steroids as well as per MICU  The family may want to terminally wean the patient.  According to the family he never even wanted intubation

## 2017-06-01 NOTE — CONSULT NOTE ADULT - PROBLEM SELECTOR PROBLEM 3
COPD exacerbation
Functional quadriplegia
Chronic congestive heart failure, unspecified congestive heart failure type

## 2017-06-01 NOTE — PROGRESS NOTE ADULT - SUBJECTIVE AND OBJECTIVE BOX
Patient is a 82y old  Male who presents with a chief complaint of     Any change in ROS:     MEDICATIONS  (STANDING):  propofol Infusion 50MICROgram(s)/kG/Min IV Continuous <Continuous>  heparin  Injectable 5000Unit(s) SubCutaneous every 8 hours  ipratropium 17 MICROgram(s) HFA Inhaler 1Puff(s) Inhalation every 6 hours  chlorhexidine 4% Liquid 1Application(s) Topical daily  ALBUTerol    90 MICROgram(s) HFA Inhaler 2Puff(s) Inhalation every 6 hours  sodium chloride 3%  Inhalation 3milliLiter(s) Inhalation every 8 hours  dextrose 5%. 1000milliLiter(s) IV Continuous <Continuous>  dextrose 50% Injectable 12.5Gram(s) IV Push once  dextrose 50% Injectable 25Gram(s) IV Push once  dextrose 50% Injectable 25Gram(s) IV Push once  insulin lispro (HumaLOG) corrective regimen sliding scale  SubCutaneous every 6 hours  insulin NPH human recombinant 9Unit(s) SubCutaneous every 6 hours  cisatracurium Infusion 1MICROgram(s)/kG/Min IV Continuous <Continuous>  midazolam Infusion 3mG/Hr IV Continuous <Continuous>  meropenem IVPB  IV Intermittent   norepinephrine Infusion 0.05MICROgram(s)/kG/Min IV Continuous <Continuous>  petrolatum Ophthalmic Ointment 1Application(s) Both EYES two times a day  levothyroxine Injectable 37.5MICROGram(s) IV Push daily    MEDICATIONS  (PRN):  dextrose Gel 1Dose(s) Oral once PRN Blood Glucose LESS THAN 70 milliGRAM(s)/deciliter  glucagon  Injectable 1milliGRAM(s) IntraMuscular once PRN Glucose LESS THAN 70 milligrams/deciliter    Vital Signs Last 24 Hrs  T(C): 38.3, Max: 38.4 (06-01 @ 08:00)  T(F): 101, Max: 101.1 (06-01 @ 08:00)  HR: 93 (73 - 123)  BP: 117/62 (79/43 - 170/67)  BP(mean): 72 (53 - 93)  RR: 20 (0 - 24)  SpO2: 95% (94% - 100%)  Mode: AC/ CMV (Assist Control/ Continuous Mandatory Ventilation)  RR (machine): 20  TV (machine): 550  FiO2: 40  PEEP: 5  MAP: 15.7  PIP: 46    I&O's Summary  I & Os for 24h ending 01 Jun 2017 07:00  =============================================  IN: 885.1 ml / OUT: 710 ml / NET: 175.1 ml    I & Os for current day (as of 01 Jun 2017 14:13)  =============================================  IN: 310.5 ml / OUT: 0 ml / NET: 310.5 ml        Physical Exam:   GENERAL: NAD, well-groomed, well-developed  HEENT: MEHNAZ/   Atraumatic, Normocephalic  ENMT: No tonsillar erythema, exudates, or enlargement; Moist mucous membranes, Good dentition, No lesions  NECK: Supple, No JVD, Normal thyroid  CHEST/LUNG: Clear to percussion bilaterally; No rales, rhonchi, wheezing, or rubs  CVS: Regular rate and rhythm; No murmurs, rubs, or gallops  GI: : Soft, Nontender, Nondistended; Bowel sounds present  NERVOUS SYSTEM:  Alert & Oriented X3, Good concentration; Motor Strength 5/5 B/L upper and lower extremities; DTRs 2+ intact and symmetric  EXTREMITIES:  2+ Peripheral Pulses, No clubbing, cyanosis, or edema  LYMPH: No lymphadenopathy noted  SKIN: No rashes or lesions  ENDOCRINOLOGY: No Thyromegaly  PSYCH: Appropriate/demented/others    Labs:  ABG - ( 01 Jun 2017 05:10 )  pH: 7.16  /  pCO2: 60    /  pO2: 148   / HCO3: 18    / Base Excess: -6.7  /  SaO2: 98.1                                        10.9   12.26 )-----------( 111      ( 01 Jun 2017 03:30 )             33.4     06-01    136  |  91<L>  |  70<H>  ----------------------------<  160<H>  4.6   |  29  |  4.28<H>    Ca    7.5<L>      01 Jun 2017 03:30  Phos  8.0     06-01  Mg     2.6     06-01    TPro  6.0  /  Alb  3.1<L>  /  TBili  0.6  /  DBili  x   /  AST  10  /  ALT  9   /  AlkPhos  62  06-01    CAPILLARY BLOOD GLUCOSE  155 (01 Jun 2017 12:00)  160 (01 Jun 2017 05:11)  213 (31 May 2017 23:38)  178 (31 May 2017 17:00)    LIVER FUNCTIONS - ( 01 Jun 2017 03:30 )  Alb: 3.1 g/dL / Pro: 6.0 g/dL / ALK PHOS: 62 u/L / ALT: 9 u/L / AST: 10 u/L / GGT: x                   Cultures:                   Wound culture:                05-25 @ 14:54  Organism --  Culture w/ gram stain --  Specimen Source BLOOD VENOUS        Abscess culture:             05-25 @ 14:54  Organism --  Gram Stain --  Specimen Source BLOOD VENOUS            Tissue culture:           05-25 @ 14:54  Organism --  Gram Stain --  Specimen Source BLOOD VENOUS        Body Fluid Smear & Culture:                        05-25 @ 14:54  AFB Smear  --  Culture Acid Fast Body Fluid w/ Smear  --  Culture Acid Fast Smear Concentrated   --    Culture Results:     --  Specimen Source BLOOD VENOUS            Rapid Respiratory Viral Panel Result        05-25 @ 17:00  Rapid RVP --  Coronovirus NOT DETECTED  Adenovirus NOT DETECTED  Bordetella Pertussis NOT DETECTED  Chlamydia Pneumonia NOT DETECTED  Entero/RhinovirusNOT DETECTED  HKU1 Coronovirus NOT DETECTED  HMPV Coronovirus NOT DETECTED  Influenza A NOT DETECTED (any subtype)  Influenza AH1 NOT DETECTED  Influenza AH1 2009 NOT DETECTED  Influenza AH3 NOT DETECTED  Influenza B NOT DETECTED  Mycoplasma Pneumoniae NOT DETECTED This nucleic acid amplification assay was performed using  the [employer]. The following pathogens are tested  for: Adenovirus, Coronavirus 229E, Coronavirus HKU1,  Coronavirus NL63, Coronavirus OC43, Human Metapneumovirus  (HMPV),  NL63 Coronovirus NOT DETECTED  OC43 Coronovirus NOT DETECTED  Parainfluenza 1 NOT DETECTED  Parainfluenza 2 NOT DETECTED  Parainfluenza 3 POSITIVE  Parainfluenza 4 NOT DETECTED  Resp Syncytial Virus NOT DETECTED        Studies  Chest X-RAY  CT SCAN Chest   CT Abdomen  Venous Dopplers: LE:   Others Patient is a 82y old  Male who presents with a chief complaint of   doing poorly  events noted: became bradycardic and febrile overnight    Any change in ROS: cant obtain     MEDICATIONS  (STANDING):  propofol Infusion 50MICROgram(s)/kG/Min IV Continuous <Continuous>  heparin  Injectable 5000Unit(s) SubCutaneous every 8 hours  ipratropium 17 MICROgram(s) HFA Inhaler 1Puff(s) Inhalation every 6 hours  chlorhexidine 4% Liquid 1Application(s) Topical daily  ALBUTerol    90 MICROgram(s) HFA Inhaler 2Puff(s) Inhalation every 6 hours  sodium chloride 3%  Inhalation 3milliLiter(s) Inhalation every 8 hours  dextrose 5%. 1000milliLiter(s) IV Continuous <Continuous>  dextrose 50% Injectable 12.5Gram(s) IV Push once  dextrose 50% Injectable 25Gram(s) IV Push once  dextrose 50% Injectable 25Gram(s) IV Push once  insulin lispro (HumaLOG) corrective regimen sliding scale  SubCutaneous every 6 hours  insulin NPH human recombinant 9Unit(s) SubCutaneous every 6 hours  cisatracurium Infusion 1MICROgram(s)/kG/Min IV Continuous <Continuous>  midazolam Infusion 3mG/Hr IV Continuous <Continuous>  meropenem IVPB  IV Intermittent   norepinephrine Infusion 0.05MICROgram(s)/kG/Min IV Continuous <Continuous>  petrolatum Ophthalmic Ointment 1Application(s) Both EYES two times a day  levothyroxine Injectable 37.5MICROGram(s) IV Push daily    MEDICATIONS  (PRN):  dextrose Gel 1Dose(s) Oral once PRN Blood Glucose LESS THAN 70 milliGRAM(s)/deciliter  glucagon  Injectable 1milliGRAM(s) IntraMuscular once PRN Glucose LESS THAN 70 milligrams/deciliter    Vital Signs Last 24 Hrs  T(C): 38.3, Max: 38.4 (06-01 @ 08:00)  T(F): 101, Max: 101.1 (06-01 @ 08:00)  HR: 93 (73 - 123)  BP: 117/62 (79/43 - 170/67)  BP(mean): 72 (53 - 93)  RR: 20 (0 - 24)  SpO2: 95% (94% - 100%)  Mode: AC/ CMV (Assist Control/ Continuous Mandatory Ventilation)  RR (machine): 20  TV (machine): 550  FiO2: 40  PEEP: 5  MAP: 15.7  PIP: 46    I&O's Summary  I & Os for 24h ending 01 Jun 2017 07:00  =============================================  IN: 885.1 ml / OUT: 710 ml / NET: 175.1 ml    I & Os for current day (as of 01 Jun 2017 14:13)  =============================================  IN: 310.5 ml / OUT: 0 ml / NET: 310.5 ml        Physical Exam:   GENERAL: NAD, well-groomed, well-developed  HEENT: MEHNAZ/   Atraumatic, Normocephalic  ENMT: No tonsillar erythema, exudates, or enlargement; Moist mucous membranes, Good dentition, No lesions  NECK: Supple, No JVD, Normal thyroid  CHEST/LUNG: coarse rhonchi  CVS: Regular rate and rhythm; No murmurs, rubs, or gallops  GI: : distended  NERVOUS SYSTEM:  Alert & Oriented X3, Good concentration; Motor Strength 5/5 B/L upper and lower extremities; DTRs 2+ intact and symmetric  EXTREMITIES:  2+ Peripheral Pulses, No clubbing, cyanosis, or edema  LYMPH: No lymphadenopathy noted  SKIN: No rashes or lesions  ENDOCRINOLOGY: No Thyromegaly  PSYCH: sedated and intuabted    Labs:  ABG - ( 01 Jun 2017 05:10 )  pH: 7.16  /  pCO2: 60    /  pO2: 148   / HCO3: 18    / Base Excess: -6.7  /  SaO2: 98.1                                        10.9   12.26 )-----------( 111      ( 01 Jun 2017 03:30 )             33.4     06-01    136  |  91<L>  |  70<H>  ----------------------------<  160<H>  4.6   |  29  |  4.28<H>    Ca    7.5<L>      01 Jun 2017 03:30  Phos  8.0     06-01  Mg     2.6     06-01    TPro  6.0  /  Alb  3.1<L>  /  TBili  0.6  /  DBili  x   /  AST  10  /  ALT  9   /  AlkPhos  62  06-01    CAPILLARY BLOOD GLUCOSE  155 (01 Jun 2017 12:00)  160 (01 Jun 2017 05:11)  213 (31 May 2017 23:38)  178 (31 May 2017 17:00)    LIVER FUNCTIONS - ( 01 Jun 2017 03:30 )  Alb: 3.1 g/dL / Pro: 6.0 g/dL / ALK PHOS: 62 u/L / ALT: 9 u/L / AST: 10 u/L / GGT: x                   Cultures:                   Wound culture:                05-25 @ 14:54  Organism --  Culture w/ gram stain --  Specimen Source BLOOD VENOUS        Abscess culture:             05-25 @ 14:54  Organism --  Gram Stain --  Specimen Source BLOOD VENOUS            Tissue culture:           05-25 @ 14:54  Organism --  Gram Stain --  Specimen Source BLOOD VENOUS        Body Fluid Smear & Culture:                        05-25 @ 14:54  AFB Smear  --  Culture Acid Fast Body Fluid w/ Smear  --  Culture Acid Fast Smear Concentrated   --    Culture Results:     --  Specimen Source BLOOD VENOUS            Rapid Respiratory Viral Panel Result        05-25 @ 17:00  Rapid RVP --  Coronovirus NOT DETECTED  Adenovirus NOT DETECTED  Bordetella Pertussis NOT DETECTED  Chlamydia Pneumonia NOT DETECTED  Entero/RhinovirusNOT DETECTED  HKU1 Coronovirus NOT DETECTED  HMPV Coronovirus NOT DETECTED  Influenza A NOT DETECTED (any subtype)  Influenza AH1 NOT DETECTED  Influenza AH1 2009 NOT DETECTED  Influenza AH3 NOT DETECTED  Influenza B NOT DETECTED  Mycoplasma Pneumoniae NOT DETECTED This nucleic acid amplification assay was performed using  the [employer]. The following pathogens are tested  for: Adenovirus, Coronavirus 229E, Coronavirus HKU1,  Coronavirus NL63, Coronavirus OC43, Human Metapneumovirus  (HMPV),  NL63 Coronovirus NOT DETECTED  OC43 Coronovirus NOT DETECTED  Parainfluenza 1 NOT DETECTED  Parainfluenza 2 NOT DETECTED  Parainfluenza 3 POSITIVE  Parainfluenza 4 NOT DETECTED  Resp Syncytial Virus NOT DETECTED        Studies  Chest X-RAYIMPRESSION:  Right IJ line with tip in the SVC. No retained guidewire or   pneumothorax seen.    Patchy right lower lung opacity which could be secondary to atelectasis   and/or pneumonia.  CT SCAN Chest   CT Abdomen  Venous Dopplers: LE:   Others

## 2017-06-01 NOTE — CONSULT NOTE ADULT - PROBLEM SELECTOR PROBLEM 2
Acute respiratory failure with hypoxia and hypercapnia
ESRD on dialysis
Chronic obstructive pulmonary disease, unspecified COPD type

## 2017-06-01 NOTE — CONSULT NOTE ADULT - PROBLEM SELECTOR RECOMMENDATION 3
PPSV2 10%. Full assist with ADL's.
as above
pt appears euvolemic @ present.  no peripheral edema or JVD on physical exam.  CXR also performed and had no indication of CHF

## 2017-06-01 NOTE — CONSULT NOTE ADULT - SUBJECTIVE AND OBJECTIVE BOX
HPI:  81 yo M with a PMH ESRD on HD MWF, CAD, HTN, DM, COPD (supposed to be on home O2 but pt does not use), diastolic CHF (EF 65%), HLD, anemia of chronic disease, presented with dyspnea, secondary to parainfluenza, intubated, worsening respiratory failure, on pressors.     PERTINENT PMH REVIEWED:  [x ] YES [ ] NO           SOCIAL HISTORY:  Significant other/partner:  [x ] YES  [ ] NO            Children:  [x ] YES  [ ] NO                   Shinto/Spirituality: Methodist  Subtance hx:  [ ] YES   [x ] NO           Tobacco hx:  [x ] YES  [ ] NO             Alcohol hx: [ ] YES  [x ] NO        Home Opiod hx:  [x ] YES  [ ] NO   Living Situation: [x ] Home  [ ] Long term care  [ ] Rehab    REFERRALS:   [ ] Chaplaincy  [ ] Hospice  [ ] Child Life  [ ] Social Work  [ ] Case management [ ] Holistic Therapy     FAMILY HISTORY:  Family history of diabetes mellitus (Mother)  Family history unknown  Family history of diabetes mellitus  Family history of hypertension  Family history of hypertension  Family history of diabetes mellitus    [x ] Family history non contributory     BASELINE ADLs (prior to admission):  Independent [ ] moderately [ ] fully   Dependent   [ x] moderately [ ] fully    ADVANCE DIRECTIVES:  [x ] YES [ ] NO   DNR [x ] YES [ ] NO                      MOLST  [ ] YES [x ] NO    Living Will  [ ] YES [x ] NO    Health Care Proxy [ ] YES  [x ] NO      [x ] Surrogate  [ ] HCP  [ ] Guardian:      Kenneth Edwards (son)                  Phone#:  [phone]    Allergies    No Known Allergies    Intolerances        MEDICATIONS  (STANDING):  propofol Infusion 50MICROgram(s)/kG/Min IV Continuous <Continuous>  heparin  Injectable 5000Unit(s) SubCutaneous every 8 hours  ipratropium 17 MICROgram(s) HFA Inhaler 1Puff(s) Inhalation every 6 hours  chlorhexidine 4% Liquid 1Application(s) Topical daily  ALBUTerol    90 MICROgram(s) HFA Inhaler 2Puff(s) Inhalation every 6 hours  sodium chloride 3%  Inhalation 3milliLiter(s) Inhalation every 8 hours  dextrose 5%. 1000milliLiter(s) IV Continuous <Continuous>  dextrose 50% Injectable 12.5Gram(s) IV Push once  dextrose 50% Injectable 25Gram(s) IV Push once  dextrose 50% Injectable 25Gram(s) IV Push once  insulin lispro (HumaLOG) corrective regimen sliding scale  SubCutaneous every 6 hours  insulin NPH human recombinant 9Unit(s) SubCutaneous every 6 hours  cisatracurium Infusion 1MICROgram(s)/kG/Min IV Continuous <Continuous>  midazolam Infusion 3mG/Hr IV Continuous <Continuous>  meropenem IVPB  IV Intermittent   norepinephrine Infusion 0.05MICROgram(s)/kG/Min IV Continuous <Continuous>  petrolatum Ophthalmic Ointment 1Application(s) Both EYES two times a day  levothyroxine Injectable 37.5MICROGram(s) IV Push daily    MEDICATIONS  (PRN):  dextrose Gel 1Dose(s) Oral once PRN Blood Glucose LESS THAN 70 milliGRAM(s)/deciliter  glucagon  Injectable 1milliGRAM(s) IntraMuscular once PRN Glucose LESS THAN 70 milligrams/deciliter      PRESENT SYMPTOMS:  Source: [ ] Patient   [x ] Family   [ ] Team     Pain: [ ] YES [x] NO  OLDCARTS:     Dyspnea: [ ] YES [ ] NO   Anxiety: [ ] YES [ ] NO  Fatigue: [ ] YES [ ] NO   Nausea: [ ] YES [ ] NO  Loss of appetite: [ ] YES [ ] NO   Constipation: [ ] YES [ ] NO     Other Symptoms:  [ ] All other review of systems negative   [x ] Unable to obtain due to poor mentation     Karnofsky Performance Score/Palliative Performance Status Version 2: 10        %  Protein Calorie Malutrition:  [ ] Mild   [ ] Moderate   [ ] Severe     Vital Signs Last 24 Hrs  T(C): 38.3, Max: 38.4 (06-01 @ 08:00)  T(F): 101, Max: 101.1 (06-01 @ 08:00)  HR: 103 (73 - 123)  BP: 98/45 (79/43 - 170/67)  BP(mean): 55 (53 - 93)  RR: 20 (0 - 24)  SpO2: 95% (94% - 100%)    Physical Exam:    General: [ ] Alert,  A&O x     [ ] lethargic   [ ] Agitated   [ ] Cachexia [x] sedated  HEENT: [ ] Normal   [x ] Dry mouth   [x ] ET Tube    [ ] Trach   Lungs: [ ] Clear [x ] Rhonchi  [ ] Crackles [x ] Wheezing [ ] Tachypnea  [ ] Audible excessive secretions   Cardiovascular:  [ x] Regular rate and rhythm  [ ] Irregular [ ] Tachycardia   [ ] Bradycardia   Abdomen: [ x] Soft  [ ] Distended  [ ]  [x ] +BS  [x ] Non tender [ ] Tender  [ ]PEG   [x ] NGT   Last BM:     Genitourinary: [ ] Normal [ ] Incontinent   [x ] Oliguria/Anuria   [ ] Gar  Musculoskeletal:  [ ] Normal   [ ] Generalized weakness  [x ] Bedbound   Neurological: [ ] No focal deficits  [ ] Cognitive impairment [x] sedated     Skin: [ x] Normal   [ ] Pressure ulcers     LABS:                        10.9   12.26 )-----------( 111      ( 01 Jun 2017 03:30 )             33.4     06-01    136  |  91<L>  |  70<H>  ----------------------------<  160<H>  4.6   |  29  |  4.28<H>    Ca    7.5<L>      01 Jun 2017 03:30  Phos  8.0     06-01  Mg     2.6     06-01    TPro  6.0  /  Alb  3.1<L>  /  TBili  0.6  /  DBili  x   /  AST  10  /  ALT  9   /  AlkPhos  62  06-01        I&O's Summary  I & Os for 24h ending 01 Jun 2017 07:00  =============================================  IN: 885.1 ml / OUT: 710 ml / NET: 175.1 ml    I & Os for current day (as of 01 Jun 2017 13:03)  =============================================  IN: 310.5 ml / OUT: 0 ml / NET: 310.5 ml      RADIOLOGY & ADDITIONAL STUDIES:  Reviewed

## 2017-06-01 NOTE — PROGRESS NOTE ADULT - SUBJECTIVE AND OBJECTIVE BOX
CHIEF COMPLAINT: Patient is a 82y old  Male who presents with a chief complaint of   Interval Events:    REVIEW OF SYSTEMS:  Constitutional:   Eyes:  ENT:  CV:  Resp:  GI:  :  MSK:  Integumentary:  Neurological:  Psychiatric:  Endocrine:  Hematologic/Lymphatic:  Allergic/Immunologic:  [ ] All other systems negative  [ ] Unable to assess ROS because ________    OBJECTIVE:  ICU Vital Signs Last 24 Hrs  T(C): 38.2, Max: 38.2 (06-01 @ 04:00)  T(F): 100.7, Max: 100.7 (06-01 @ 04:00)  HR: 119 (73 - 123)  BP: 93/47 (79/43 - 170/67)  BP(mean): 56 (53 - 93)  ABP: --  ABP(mean): --  RR: 20 (15 - 24)  SpO2: 95% (94% - 100%)    Mode: AC/ CMV (Assist Control/ Continuous Mandatory Ventilation), RR (machine): 15, TV (machine): 550, FiO2: 40, PEEP: 5, MAP: 13, PIP: 45    I & Os for current day (as of 06-01 @ 08:04)  =============================================  IN: 885.1 ml / OUT: 710 ml / NET: 175.1 ml    CAPILLARY BLOOD GLUCOSE  160 (01 Jun 2017 05:11)      PHYSICAL EXAM:  General:   HEENT:   Lymph Nodes:  Neck:   Respiratory:   Cardiovascular:   Abdomen:   Extremities:   Skin:   Neurological:  Psychiatry:    HOSPITAL MEDICATIONS:  MEDICATIONS  (STANDING):  atorvastatin 40milliGRAM(s) Oral at bedtime  montelukast 10milliGRAM(s) Oral daily  ascorbic acid 500milliGRAM(s) Oral daily  levothyroxine 75MICROGram(s) Oral daily  propofol Infusion 50MICROgram(s)/kG/Min IV Continuous <Continuous>  norepinephrine Infusion 0.03MICROgram(s)/kG/Min IV Continuous <Continuous>  heparin  Injectable 5000Unit(s) SubCutaneous every 8 hours  ipratropium 17 MICROgram(s) HFA Inhaler 1Puff(s) Inhalation every 6 hours  chlorhexidine 4% Liquid 1Application(s) Topical daily  ALBUTerol    90 MICROgram(s) HFA Inhaler 2Puff(s) Inhalation every 6 hours  sodium chloride 3%  Inhalation 3milliLiter(s) Inhalation every 8 hours  dextrose 5%. 1000milliLiter(s) IV Continuous <Continuous>  dextrose 50% Injectable 12.5Gram(s) IV Push once  dextrose 50% Injectable 25Gram(s) IV Push once  dextrose 50% Injectable 25Gram(s) IV Push once  multivitamin   Solution 5milliLiter(s) Oral daily  methylPREDNISolone sodium succinate Injectable 40milliGRAM(s) IV Push two times a day  calcium acetate 1334milliGRAM(s) Oral three times a day with meals  senna Syrup 10milliLiter(s) Oral daily  insulin lispro (HumaLOG) corrective regimen sliding scale  SubCutaneous every 6 hours  pantoprazole  Injectable 40milliGRAM(s) IV Push every 12 hours  insulin NPH human recombinant 9Unit(s) SubCutaneous every 6 hours  cisatracurium Infusion 1MICROgram(s)/kG/Min IV Continuous <Continuous>  lactulose Syrup 30Gram(s) Oral every 8 hours  sodium bicarbonate 650milliGRAM(s) Oral every 8 hours  sevelamer hydrochloride 1600milliGRAM(s) Oral every 8 hours  midazolam Infusion 3mG/Hr IV Continuous <Continuous>  meropenem IVPB  IV Intermittent     MEDICATIONS  (PRN):  dextrose Gel 1Dose(s) Oral once PRN Blood Glucose LESS THAN 70 milliGRAM(s)/deciliter  glucagon  Injectable 1milliGRAM(s) IntraMuscular once PRN Glucose LESS THAN 70 milligrams/deciliter  acetaminophen    Suspension 650milliGRAM(s) Oral every 6 hours PRN For Temp greater than 38.5 C (101.3 F)      LABS:  (06-01 @ 03:30)                        10.9  12.26 )-----------( 111                 33.4    Neutrophils = -- (--%)  Lymphocytes = -- (--%)  Eosinophils = -- (--%)  Basophils = -- (--%)  Monocytes = -- (--%)  Bands = --%    WBC Trend: 12.26<--, 8.09<--, 7.79<--  Hb Trend: 10.9<--, 11.0<--, 10.8<--, 10.2<--, 10.3<--  Plt Trend: 111<--, 84<--, 82<--, 98<--, 80<--  06-01    136  |  91<L>  |  70<H>  ----------------------------<  160<H>  4.6   |  29  |  4.28<H>    Ca    7.5<L>      01 Jun 2017 03:30  Phos  8.0     06-01  Mg     2.6     06-01    TPro  6.0  /  Alb  3.1<L>  /  TBili  0.6  /  DBili  x   /  AST  10  /  ALT  9   /  AlkPhos  62  06-01    Creatinine Trend: 4.28<--, 2.99<--, 2.65<--, 4.12<--, 5.02<--, 5.02<--      Arterial Blood Gas:  06-01 @ 05:10  7.16/60/148/18/98.1/-6.7  ABG lactate: 1.6  Arterial Blood Gas:  06-01 @ 03:30  7.17/66/54/19/78.4/-4.6  ABG lactate: --  Arterial Blood Gas:  05-31 @ 13:27  7.27/51/52/21/83.3/-3.4  ABG lactate: --  Arterial Blood Gas:  05-31 @ 03:27  7.28/60/152/24/98.5/1.0  ABG lactate: 1.0  Arterial Blood Gas:  05-31 @ 00:50  7.23/69/135/24/98.2/1.1  ABG lactate: 1.0  Arterial Blood Gas:  05-30 @ 18:15  7.23/74/117/26/97.4/3.0  ABG lactate: 0.8  Arterial Blood Gas:  05-30 @ 10:30  7.18/63/118/20/97.9/-4.6  ABG lactate: 1.5            MICROBIOLOGY:   Blood Cx:  Urine Cx:  Sputum Cx:  Legionella:  RVP:    RADIOLOGY:  X Ray:  CT:  MRI:  Ultrasound:  [ ] Reviewed and interpreted by me    EKG: CHIEF COMPLAINT: Dyspnea     Interval Events: Pt spiked fever to 100.8 with increasing pressor requirements. Had repeated episodes of bradycardia to 40s and tachycardia. Started on vancomycin and meropenem. Blood cx drawn. Sputum culture appeared purulent.     REVIEW OF SYSTEMS:  Constitutional:   Eyes:  ENT:  CV:  Resp:  GI:  :  MSK:  Integumentary:  Neurological:  Psychiatric:  Endocrine:  Hematologic/Lymphatic:  Allergic/Immunologic:  [ ] All other systems negative  [ x ] Unable to assess ROS because intubated    OBJECTIVE:  ICU Vital Signs Last 24 Hrs  T(C): 38.2, Max: 38.2 (06-01 @ 04:00)  T(F): 100.7, Max: 100.7 (06-01 @ 04:00)  HR: 119 (73 - 123)  BP: 93/47 (79/43 - 170/67)  BP(mean): 56 (53 - 93)  ABP: --  ABP(mean): --  RR: 20 (15 - 24)  SpO2: 95% (94% - 100%)    Mode: AC/ CMV (Assist Control/ Continuous Mandatory Ventilation), RR (machine): 15, TV (machine): 550, FiO2: 40, PEEP: 5, MAP: 13, PIP: 45    I & Os for current day (as of 06-01 @ 08:04)  =============================================  IN: 885.1 ml / OUT: 710 ml / NET: 175.1 ml    CAPILLARY BLOOD GLUCOSE  160 (01 Jun 2017 05:11)      PHYSICAL EXAM:  General: sedated, intubated  HEENT: nonbloody secretions  Lymph Nodes: no lymphadenopathy  Neck: no JVD  Respiratory: mild rhonchi b/l  Cardiovascular: RRR, no murmurs  Abdomen: distended, hypoactive BS  Extremities: trace edema  Skin: no rash  Neurological: sedated  Psychiatry:    HOSPITAL MEDICATIONS:  MEDICATIONS  (STANDING):  atorvastatin 40milliGRAM(s) Oral at bedtime  montelukast 10milliGRAM(s) Oral daily  ascorbic acid 500milliGRAM(s) Oral daily  levothyroxine 75MICROGram(s) Oral daily  propofol Infusion 50MICROgram(s)/kG/Min IV Continuous <Continuous>  norepinephrine Infusion 0.03MICROgram(s)/kG/Min IV Continuous <Continuous>  heparin  Injectable 5000Unit(s) SubCutaneous every 8 hours  ipratropium 17 MICROgram(s) HFA Inhaler 1Puff(s) Inhalation every 6 hours  chlorhexidine 4% Liquid 1Application(s) Topical daily  ALBUTerol    90 MICROgram(s) HFA Inhaler 2Puff(s) Inhalation every 6 hours  sodium chloride 3%  Inhalation 3milliLiter(s) Inhalation every 8 hours  dextrose 5%. 1000milliLiter(s) IV Continuous <Continuous>  dextrose 50% Injectable 12.5Gram(s) IV Push once  dextrose 50% Injectable 25Gram(s) IV Push once  dextrose 50% Injectable 25Gram(s) IV Push once  multivitamin   Solution 5milliLiter(s) Oral daily  methylPREDNISolone sodium succinate Injectable 40milliGRAM(s) IV Push two times a day  calcium acetate 1334milliGRAM(s) Oral three times a day with meals  senna Syrup 10milliLiter(s) Oral daily  insulin lispro (HumaLOG) corrective regimen sliding scale  SubCutaneous every 6 hours  pantoprazole  Injectable 40milliGRAM(s) IV Push every 12 hours  insulin NPH human recombinant 9Unit(s) SubCutaneous every 6 hours  cisatracurium Infusion 1MICROgram(s)/kG/Min IV Continuous <Continuous>  lactulose Syrup 30Gram(s) Oral every 8 hours  sodium bicarbonate 650milliGRAM(s) Oral every 8 hours  sevelamer hydrochloride 1600milliGRAM(s) Oral every 8 hours  midazolam Infusion 3mG/Hr IV Continuous <Continuous>  meropenem IVPB  IV Intermittent     MEDICATIONS  (PRN):  dextrose Gel 1Dose(s) Oral once PRN Blood Glucose LESS THAN 70 milliGRAM(s)/deciliter  glucagon  Injectable 1milliGRAM(s) IntraMuscular once PRN Glucose LESS THAN 70 milligrams/deciliter  acetaminophen    Suspension 650milliGRAM(s) Oral every 6 hours PRN For Temp greater than 38.5 C (101.3 F)      LABS:  (06-01 @ 03:30)                        10.9  12.26 )-----------( 111                 33.4    Neutrophils = -- (--%)  Lymphocytes = -- (--%)  Eosinophils = -- (--%)  Basophils = -- (--%)  Monocytes = -- (--%)  Bands = --%    WBC Trend: 12.26<--, 8.09<--, 7.79<--  Hb Trend: 10.9<--, 11.0<--, 10.8<--, 10.2<--, 10.3<--  Plt Trend: 111<--, 84<--, 82<--, 98<--, 80<--  06-01    136  |  91<L>  |  70<H>  ----------------------------<  160<H>  4.6   |  29  |  4.28<H>    Ca    7.5<L>      01 Jun 2017 03:30  Phos  8.0     06-01  Mg     2.6     06-01    TPro  6.0  /  Alb  3.1<L>  /  TBili  0.6  /  DBili  x   /  AST  10  /  ALT  9   /  AlkPhos  62  06-01    Creatinine Trend: 4.28<--, 2.99<--, 2.65<--, 4.12<--, 5.02<--, 5.02<--      Arterial Blood Gas:  06-01 @ 05:10  7.16/60/148/18/98.1/-6.7  ABG lactate: 1.6  Arterial Blood Gas:  06-01 @ 03:30  7.17/66/54/19/78.4/-4.6  ABG lactate: --  Arterial Blood Gas:  05-31 @ 13:27  7.27/51/52/21/83.3/-3.4  ABG lactate: --  Arterial Blood Gas:  05-31 @ 03:27  7.28/60/152/24/98.5/1.0  ABG lactate: 1.0  Arterial Blood Gas:  05-31 @ 00:50  7.23/69/135/24/98.2/1.1  ABG lactate: 1.0  Arterial Blood Gas:  05-30 @ 18:15  7.23/74/117/26/97.4/3.0  ABG lactate: 0.8  Arterial Blood Gas:  05-30 @ 10:30  7.18/63/118/20/97.9/-4.6  ABG lactate: 1.5            MICROBIOLOGY:   Blood Cx:  Urine Cx:  Sputum Cx:  Legionella:  RVP:    RADIOLOGY:  X Ray:  CT:  MRI:  Ultrasound:  [ ] Reviewed and interpreted by me    EKG:

## 2017-06-01 NOTE — PROGRESS NOTE ADULT - SUBJECTIVE AND OBJECTIVE BOX
Dr. Stacia Parnell  NEPHROLOGY-[facility] [phone]        Patient seen and examined @ bedside.  Intubated/sedated/paralyzed        MEDICATIONS  (STANDING):  atorvastatin 40milliGRAM(s) Oral at bedtime  montelukast 10milliGRAM(s) Oral daily  ascorbic acid 500milliGRAM(s) Oral daily  levothyroxine 75MICROGram(s) Oral daily  propofol Infusion 50MICROgram(s)/kG/Min IV Continuous <Continuous>  heparin  Injectable 5000Unit(s) SubCutaneous every 8 hours  ipratropium 17 MICROgram(s) HFA Inhaler 1Puff(s) Inhalation every 6 hours  chlorhexidine 4% Liquid 1Application(s) Topical daily  ALBUTerol    90 MICROgram(s) HFA Inhaler 2Puff(s) Inhalation every 6 hours  sodium chloride 3%  Inhalation 3milliLiter(s) Inhalation every 8 hours  dextrose 5%. 1000milliLiter(s) IV Continuous <Continuous>  dextrose 50% Injectable 12.5Gram(s) IV Push once  dextrose 50% Injectable 25Gram(s) IV Push once  dextrose 50% Injectable 25Gram(s) IV Push once  multivitamin   Solution 5milliLiter(s) Oral daily  methylPREDNISolone sodium succinate Injectable 40milliGRAM(s) IV Push two times a day  calcium acetate 1334milliGRAM(s) Oral three times a day with meals  senna Syrup 10milliLiter(s) Oral daily  insulin lispro (HumaLOG) corrective regimen sliding scale  SubCutaneous every 6 hours  pantoprazole  Injectable 40milliGRAM(s) IV Push every 12 hours  insulin NPH human recombinant 9Unit(s) SubCutaneous every 6 hours  cisatracurium Infusion 1MICROgram(s)/kG/Min IV Continuous <Continuous>  lactulose Syrup 30Gram(s) Oral every 8 hours  sodium bicarbonate 650milliGRAM(s) Oral every 8 hours  sevelamer hydrochloride 1600milliGRAM(s) Oral every 8 hours  midazolam Infusion 3mG/Hr IV Continuous <Continuous>  meropenem IVPB  IV Intermittent   norepinephrine Infusion 0.05MICROgram(s)/kG/Min IV Continuous <Continuous>      VITAL:  T(C): , Max: 38.4 (06-01 @ 08:00)  T(F): , Max: 101.1 (06-01 @ 08:00)  HR: 105  BP: 105/47  BP(mean): 61  RR: 20  SpO2: 96%  Wt(kg): --    I and O's:  I & Os for 24h ending 06-01 @ 07:00  =============================================  IN: 885.1 ml / OUT: 710 ml / NET: 175.1 ml    I & Os for current day (as of 06-01 @ 10:21)  =============================================  IN: 261.8 ml / OUT: 0 ml / NET: 261.8 ml        PHYSICAL EXAM:    Constitutional: Intubated/sedated  HEENT: PERRLA    Neck:  No JVD  Respiratory: decreased breath sounds b/l  Cardiovascular: S1 and S2  Gastrointestinal: BS+, Distended; OGT to suction  Extremities: No peripheral edema  Neurological:unable 2/2 intubation  Psychiatric: Normal mood, normal affect  : +hameed  Skin: No rashes  Access: L AVF +thrill    LABS:                        10.9   12.26 )-----------( 111      ( 01 Jun 2017 03:30 )             33.4     06-01    136  |  91<L>  |  70<H>  ----------------------------<  160<H>  4.6   |  29  |  4.28<H>    Ca    7.5<L>      01 Jun 2017 03:30  Phos  8.0     06-01  Mg     2.6     06-01    TPro  6.0  /  Alb  3.1<L>  /  TBili  0.6  /  DBili  x   /  AST  10  /  ALT  9   /  AlkPhos  62  06-01          Urine Studies:          RADIOLOGY & ADDITIONAL STUDIES:  EXAM:  RAD CHEST PORTABLE URGENT        PROCEDURE DATE:  Jun 1 2017         INTERPRETATION:  TIME OF EXAM: June 1, 2017 at 7:21 AM.    CLINICAL INFORMATION: Right IJ line placement.    COMPARISON:  May 27, 2017.    TECHNIQUE:   AP Portable chest x-ray.    INTERPRETATION:     The heart is not enlarged.  ET tube tip is above the bernadette.  Enteric tube extends into the abdomen. The end is not included on this   image.  There is a right IJ line with tip in the SVC. No retained guidewire or   pneumothorax is seen.  There is patchy opacity in the lower right lung. The left lung is clear.  No pleural effusion is seen.              IMPRESSION:  Right IJ line with tip in the SVC. No retained guidewire or   pneumothorax seen.    Patchy right lower lung opacity which could be secondary to atelectasis   and/or pneumonia. Dr. Stacia Parnell  NEPHROLOGY-[facility] [phone]        Patient seen and examined @ bedside.  Intubated/sedated/paralyzed.  Pressor requirements are increasing and he was febrile.  Had bradycardia as well        MEDICATIONS  (STANDING):  atorvastatin 40milliGRAM(s) Oral at bedtime  montelukast 10milliGRAM(s) Oral daily  ascorbic acid 500milliGRAM(s) Oral daily  levothyroxine 75MICROGram(s) Oral daily  propofol Infusion 50MICROgram(s)/kG/Min IV Continuous <Continuous>  heparin  Injectable 5000Unit(s) SubCutaneous every 8 hours  ipratropium 17 MICROgram(s) HFA Inhaler 1Puff(s) Inhalation every 6 hours  chlorhexidine 4% Liquid 1Application(s) Topical daily  ALBUTerol    90 MICROgram(s) HFA Inhaler 2Puff(s) Inhalation every 6 hours  sodium chloride 3%  Inhalation 3milliLiter(s) Inhalation every 8 hours  dextrose 5%. 1000milliLiter(s) IV Continuous <Continuous>  dextrose 50% Injectable 12.5Gram(s) IV Push once  dextrose 50% Injectable 25Gram(s) IV Push once  dextrose 50% Injectable 25Gram(s) IV Push once  multivitamin   Solution 5milliLiter(s) Oral daily  methylPREDNISolone sodium succinate Injectable 40milliGRAM(s) IV Push two times a day  calcium acetate 1334milliGRAM(s) Oral three times a day with meals  senna Syrup 10milliLiter(s) Oral daily  insulin lispro (HumaLOG) corrective regimen sliding scale  SubCutaneous every 6 hours  pantoprazole  Injectable 40milliGRAM(s) IV Push every 12 hours  insulin NPH human recombinant 9Unit(s) SubCutaneous every 6 hours  cisatracurium Infusion 1MICROgram(s)/kG/Min IV Continuous <Continuous>  lactulose Syrup 30Gram(s) Oral every 8 hours  sodium bicarbonate 650milliGRAM(s) Oral every 8 hours  sevelamer hydrochloride 1600milliGRAM(s) Oral every 8 hours  midazolam Infusion 3mG/Hr IV Continuous <Continuous>  meropenem IVPB  IV Intermittent   norepinephrine Infusion 0.05MICROgram(s)/kG/Min IV Continuous <Continuous>      VITAL:  T(C): , Max: 38.4 (06-01 @ 08:00)  T(F): , Max: 101.1 (06-01 @ 08:00)  HR: 105  BP: 105/47  BP(mean): 61  RR: 20  SpO2: 96%  Wt(kg): --    I and O's:  I & Os for 24h ending 06-01 @ 07:00  =============================================  IN: 885.1 ml / OUT: 710 ml / NET: 175.1 ml    I & Os for current day (as of 06-01 @ 10:21)  =============================================  IN: 261.8 ml / OUT: 0 ml / NET: 261.8 ml        PHYSICAL EXAM:    Constitutional: Intubated/sedated  HEENT: PERRLA    Neck:  No JVD  Respiratory: decreased breath sounds b/l  Cardiovascular: S1 and S2  Gastrointestinal: BS+, Distended; OGT to suction  Extremities: No peripheral edema  Neurological:unable 2/2 intubation  Psychiatric: Normal mood, normal affect  : +hameed  Skin: No rashes  Access: L AVF +thrill    LABS:                        10.9   12.26 )-----------( 111      ( 01 Jun 2017 03:30 )             33.4     06-01    136  |  91<L>  |  70<H>  ----------------------------<  160<H>  4.6   |  29  |  4.28<H>    Ca    7.5<L>      01 Jun 2017 03:30  Phos  8.0     06-01  Mg     2.6     06-01    TPro  6.0  /  Alb  3.1<L>  /  TBili  0.6  /  DBili  x   /  AST  10  /  ALT  9   /  AlkPhos  62  06-01          Urine Studies:          RADIOLOGY & ADDITIONAL STUDIES:  EXAM:  RAD CHEST PORTABLE URGENT        PROCEDURE DATE:  Jun 1 2017         INTERPRETATION:  TIME OF EXAM: June 1, 2017 at 7:21 AM.    CLINICAL INFORMATION: Right IJ line placement.    COMPARISON:  May 27, 2017.    TECHNIQUE:   AP Portable chest x-ray.    INTERPRETATION:     The heart is not enlarged.  ET tube tip is above the bernadette.  Enteric tube extends into the abdomen. The end is not included on this   image.  There is a right IJ line with tip in the SVC. No retained guidewire or   pneumothorax is seen.  There is patchy opacity in the lower right lung. The left lung is clear.  No pleural effusion is seen.              IMPRESSION:  Right IJ line with tip in the SVC. No retained guidewire or   pneumothorax seen.    Patchy right lower lung opacity which could be secondary to atelectasis   and/or pneumonia.

## 2017-06-01 NOTE — PROGRESS NOTE ADULT - PROBLEM SELECTOR PLAN 1
HD MWF as outpt  currently being dialyzed on a day to day basis.    HD today w/ UF as ordered HD MWF as outpt.  At present he is too unstable for HD.  HD would cause further hypotension

## 2017-06-01 NOTE — CONSULT NOTE ADULT - PROBLEM SELECTOR RECOMMENDATION 2
Patient on HD.   Unclear whether further HD will be tolerated given shock, pressor requirements.
cont full vent support: wean as tolerated:   on antibiotics and steroids
likely cause of dyspnea.  On BiPAP at present.  Pulm consult please

## 2017-06-01 NOTE — DIETITIAN INITIAL EVALUATION ADULT. - OTHER INFO
Pt seen for critical care LOS.  At this time, pt is intubated/sedated, receiving HD.  Pt was not tolerating TF so feedings stopped on 5/30 and OGT placed to LWS.  Noted wt increase since admission - likely fluid related.

## 2017-06-01 NOTE — CONSULT NOTE ADULT - PROBLEM SELECTOR RECOMMENDATION 9
Patient currently on mechanical ventilation.   Acute decompensation overnight. Still acidotic.  On broad spectrum antibiotics.
parainfluenza indued COPD exacerbation: Patient is already on home oxygen  cont current supportive care
last HD performed yesterday as outpatient.  No urgency indicated @ present to perform HD.  Next HD in AM.  Consent in chart.

## 2017-06-01 NOTE — PROCEDURE NOTE - NSPROCDETAILS_GEN_ALL_CORE
patient pre-oxygenated, tube inserted, placement confirmed/connected to ventilator
lumen(s) aspirated and flushed/sterile dressing applied/guidewire recovered/sterile technique, catheter placed/ultrasound guidance

## 2017-06-01 NOTE — PROGRESS NOTE ADULT - PROBLEM SELECTOR PLAN 7
febrile this AM  on pressors/IV abx per MICU febrile this AM  on pressors/IV abx per MICU.  No objection to IVF if needed

## 2017-06-01 NOTE — DIETITIAN INITIAL EVALUATION ADULT. - PERTINENT MEDS FT
Propofol (provided 508,5kcal on 5/31), Antibx, NPH, HumaLog, Ca Acetate, Lactulose, MVI, Senna, Ascorbic Acid, Methylprednisolone

## 2017-06-01 NOTE — PROGRESS NOTE ADULT - ASSESSMENT
82M with ESRD on HD MWF, CAD, HTN, DM, COPD (supposed to be on home O2 but pt does not use, also likely HANY), diastolic CHF (EF 65%), HLD, anemia of chronic disease, ?"liver problems" and hypothyroidism admitted for COPD exacerbation in setting of parainfluenza virus. Transferred to MICU for hypercarbic respiratory failure and intubated, remains acidotic and hypercarbic    #Neuro- sedated on propofol     #CV: requiring minimal dose of levo, attempting to wean  - HFpEF: ASA held due to bleeding oropharyngeal bleeding, holding BP meds Coreg, amlodipine and Imdur    #Pulm: intubated with hypercarbia, mixed respiratory and metabolic acidosis requiring intubation due to COPD exacerbation in setting of parainfluenza    - IV solumedrol, albuterol and ipratropium Q6hrs. Completed course of azithromycin daily  - Will trend ABGs.    #GI: OG tube in place, stopped feeds due to increased residuals  - ASA held due to blood clot in OG tube, no recurrence  - Switched PPI to IV, escalate to BID if more bleeding  - Resume feeds when appropriate  - Will manually disimpact today.    #Renal: ESRD on HD  - Follow up nephrology regarding need for HD    #ID: parainfluenza+, bcx,- , Completed course of azithromycin.    #Endocrine: FS  well controlled, continue NPH.    #PPx: HSQ 82M with ESRD on HD MWF, CAD, HTN, DM, COPD (supposed to be on home O2 but pt does not use, also likely HANY), diastolic CHF (EF 65%), HLD, anemia of chronic disease, ?"liver problems" and hypothyroidism admitted for COPD exacerbation in setting of parainfluenza virus. Transferred to MICU for hypercarbic respiratory failure and intubated, remains acidotic and hypercarbic    #Neuro- sedated on propofol     #CV: increased l  - HFpEF: ASA held due to bleeding oropharyngeal bleeding, holding BP meds Coreg, amlodipine and Imdur    #Pulm: intubated with hypercarbia, mixed respiratory and metabolic acidosis requiring intubation due to COPD exacerbation in setting of parainfluenza    - IV solumedrol, albuterol and ipratropium Q6hrs. Completed course of azithromycin daily  - Will trend ABGs.    #GI: OG tube in place, stopped feeds due to increased residuals  - ASA held due to blood clot in OG tube, no recurrence  - Switched PPI to IV, escalate to BID if more bleeding  - Resume feeds when appropriate  - Will manually disimpact today.    #Renal: ESRD on HD  - Follow up nephrology regarding need for HD    #ID: parainfluenza+, bcx,- , Completed course of azithromycin.    #Endocrine: FS  well controlled, continue NPH.    #PPx: HSQ 82M with ESRD on HD MWF, CAD, HTN, DM, COPD (supposed to be on home O2 but pt does not use, also likely HANY), diastolic CHF (EF 65%), HLD, anemia of chronic disease, ?"liver problems" and hypothyroidism admitted for COPD exacerbation in setting of parainfluenza virus. Transferred to MICU for hypercarbic respiratory failure and intubated, remains acidotic and hypercarbic, now with sepsis likely due to pneumonia. Pt now DNR.     #Neuro- sedated on propofol     #CV: increased pressor requirements on levophed  - HFpEF: ASA held due to bleeding oropharyngeal bleeding, holding BP meds Coreg, amlodipine and Imdur    #Pulm: intubated with hypercarbia, mixed respiratory and metabolic acidosis now with septic shock due to pneumonia  - COPD exacerbation in setting of parainfluenza: IV solumedrol, albuterol and ipratropium Q6hrs. Completed course of azithromycin.  - Continue meropenem and vancomycin by level  - Follow up blood and sputum cx    #GI: Continued abodminal distension, stopped feeds due to large residuals  - s/p disimpaction and enemas, unable to tolerate po bowel regimen  - PPI due to upper GI bleeding ASA held    #Renal: ESRD on HD, cannot tolerate HD today due to pressor requirement per renal    #ID: parainfluenza+, on meropenem and vancomycin by level for septic shock due to likely bacterial pnuemonia    #Endocrine: FS  well controlled, continue NPH.    # Palliative Care: Goals of care discussed with family and son who is health care proxy. Pt has DNR order and would not have wanted prolonged intubation per family. Will discuss possible terminal extubation.     #PPx: HSQ

## 2017-06-02 NOTE — PROGRESS NOTE ADULT - ASSESSMENT
83 y/o male PMHx of DM, HTN, ESRD on HD, COPD, CAD  p/w SOB/tachypnea-COPD exacerbation  Respiratory acidosis and respiratory failure requiring mechanical intubation  Sepsis-new

## 2017-06-02 NOTE — PROGRESS NOTE ADULT - PROBLEM SELECTOR PLAN 4
+OGT:  on Nephro feeds, tolerating well.  Phos trending down; on Phoslo 1334mg TID;
+OGT:  on Nephro feeds, tolerating well.  Phos trending up; will increased Phoslo to 1334mg TID
cont nepro Tube feeds via OGT
cont nepro Tube feeds via OGT
off feeds at present 2/2 distention and increased residuals
off feeds at present 2/2 distention and increased residuals  being suctioned w/ 700cc out thus far today.
on dilaysis
on dilaysis
+OGT:  on Nephro feeds, tolerating well.  Phos elevated but now on Nephro feeds; will likely improve.
could not get dialysis secondary to pressor requirement
on dilaysis
IN SHOCK AT THIS TIME, ON PRESSORS AND SEDATIVES

## 2017-06-02 NOTE — PROGRESS NOTE ADULT - PROBLEM SELECTOR PLAN 7
febrile this AM at 4 AM to 103 farenheit  on pressors/IV abx per MICU.  No objection to IVF if needed

## 2017-06-02 NOTE — PROGRESS NOTE ADULT - PROBLEM SELECTOR PROBLEM 2
Chronic obstructive pulmonary disease, unspecified COPD type
Respiratory failure requiring intubation
COPD (chronic obstructive pulmonary disease)
Chronic obstructive pulmonary disease, unspecified COPD type
Respiratory failure requiring intubation

## 2017-06-02 NOTE — PROGRESS NOTE ADULT - PROVIDER SPECIALTY LIST ADULT
Hospitalist
MICU
Nephrology
Pulmonology
MICU

## 2017-06-02 NOTE — CHART NOTE - NSCHARTNOTEFT_GEN_A_CORE
Called to evaluate the patient for asystole    On physical exam patient did not respond to verbal or physical stimuli. No spontaneous respirations.  Absent heart and breath sounds. Absent radial, femoral, and carotid pulses.   Pupils are fixed and dilated absent MEHNAZ, no corneal reflex.  EKG rhythm strip shows asystole.   Patient pronounced dead at 1851PM. Attending notified.  Family notified via phone. Autopsy declined.   Karla Freeman MD PGY1

## 2017-06-02 NOTE — PROGRESS NOTE ADULT - SUBJECTIVE AND OBJECTIVE BOX
Patient is a 82y old  Male who presents with a chief complaint of SOB   resp failure  remains intubated and sedated and on pressors: EOL discussions ongoing     Any change in ROS: none     MEDICATIONS  (STANDING):  chlorhexidine 4% Liquid 1Application(s) Topical daily  sodium chloride 3%  Inhalation 3milliLiter(s) Inhalation every 8 hours  midazolam Infusion 3mG/Hr IV Continuous <Continuous>  HYDROmorphone Infusion 2mG/Hr IV Continuous <Continuous>    MEDICATIONS  (PRN):  glycopyrrolate Injectable 0.4milliGRAM(s) IV Push every 6 hours PRN Secretions    Vital Signs Last 24 Hrs  T(C): 37.1, Max: 39.4 (06-02 @ 00:00)  T(F): 98.8, Max: 103 (06-02 @ 00:00)  HR: 99 (88 - 113)  BP: 123/62 (84/49 - 128/58)  BP(mean): 75 (56 - 91)  RR: 10 (10 - 28)  SpO2: 99% (95% - 99%)  Mode: AC/ CMV (Assist Control/ Continuous Mandatory Ventilation)  RR (machine): 16  TV (machine): 450  FiO2: 40  PEEP: 5  MAP: 11  PIP: 39    I&O's Summary  I & Os for 24h ending 02 Jun 2017 07:00  =============================================  IN: 2173.9 ml / OUT: 400 ml / NET: 1773.9 ml    I & Os for current day (as of 02 Jun 2017 15:26)  =============================================  IN: 761.8 ml / OUT: 0 ml / NET: 761.8 ml        Physical Exam:   GENERAL: NAD, well-groomed, well-developed  HEENT: MEHNAZ/   Atraumatic, Normocephalic  ENMT: No tonsillar erythema, exudates, or enlargement; Moist mucous membranes, Good dentition, No lesions  NECK: Supple, No JVD, Normal thyroid  CHEST/LUNG: Clear to percussion bilaterally; No rales, rhonchi, wheezing, or rubs  CVS: Regular rate and rhythm; No murmurs, rubs, or gallops  GI: : Soft, Nontender, Nondistended; Bowel sounds present  NERVOUS SYSTEM:  Alert & Oriented X3, Good concentration; Motor Strength 5/5 B/L upper and lower extremities; DTRs 2+ intact and symmetric  EXTREMITIES:  2+ Peripheral Pulses, No clubbing, cyanosis, or edema  LYMPH: No lymphadenopathy noted  SKIN: No rashes or lesions  ENDOCRINOLOGY: No Thyromegaly  PSYCH: Appropriate/demented/others    Labs:  ABG - ( 02 Jun 2017 13:45 )  pH: 7.13  /  pCO2: 68    /  pO2: 103   / HCO3: 18    / Base Excess: -6.2  /  SaO2: 94.5                                        10.2   16.41 )-----------( 143      ( 02 Jun 2017 03:20 )             31.7     06-02    137  |  89<L>  |  92<H>  ----------------------------<  176<H>  5.4<H>   |  34<H>  |  5.41<H>    Ca    7.3<L>      02 Jun 2017 03:20  Phos  9.1     06-02  Mg     2.7     06-02    TPro  5.9<L>  /  Alb  2.7<L>  /  TBili  0.8  /  DBili  x   /  AST  42<H>  /  ALT  16  /  AlkPhos  56  06-02    CAPILLARY BLOOD GLUCOSE  218 (02 Jun 2017 12:00)  186 (02 Jun 2017 05:00)  203 (02 Jun 2017 00:30)  174 (01 Jun 2017 18:00)    LIVER FUNCTIONS - ( 02 Jun 2017 03:20 )  Alb: 2.7 g/dL / Pro: 5.9 g/dL / ALK PHOS: 56 u/L / ALT: 16 u/L / AST: 42 u/L / GGT: x           PT/INR - ( 02 Jun 2017 03:20 )   PT: 12.8 SEC;   INR: 1.14                  Cultures:                   Wound culture:                06-01 @ 11:29  Organism --  Culture w/ gram stain --  Specimen Source SPUTUM    Wound culture:                06-01 @ 09:15  Organism --  Culture w/ gram stain --  Specimen Source BLOOD VENOUS        Abscess culture:             06-01 @ 11:29  Organism --  Gram Stain --  Specimen Source SPUTUM    Abscess culture:             06-01 @ 09:15  Organism --  Gram Stain --  Specimen Source BLOOD VENOUS            Tissue culture:           06-01 @ 11:29  Organism --  Gram Stain --  Specimen Source SPUTUM    Tissue culture:           06-01 @ 09:15  Organism --  Gram Stain --  Specimen Source BLOOD VENOUS        Body Fluid Smear & Culture:                        06-01 @ 11:29  AFB Smear  --  Culture Acid Fast Body Fluid w/ Smear  --  Culture Acid Fast Smear Concentrated   --    Culture Results:     --  Specimen Source SPUTUM    Body Fluid Smear & Culture:                        06-01 @ 09:15  AFB Smear  --  Culture Acid Fast Body Fluid w/ Smear  --  Culture Acid Fast Smear Concentrated   --    Culture Results:     --  Specimen Source BLOOD VENOUS                Studies  Chest X-RAY  CT SCAN Chest   CT Abdomen  Venous Dopplers: LE:   Others

## 2017-06-02 NOTE — DISCHARGE NOTE FOR THE EXPIRED PATIENT - HOSPITAL COURSE
83 yo M with a PMH ESRD on HD MWF, CAD, HTN, DM, COPD (supposed to be on home O2 but pt does not use), diastolic CHF (EF 65%), HLD, anemia of chronic disease, ?"liver problems" and hypothyroidism p/from PCP with SOB x1 day, likely 2/2 COPD exacerbation due to parainfluenza s/p intubation for hypercapnic respiratory failure. Course complicated by persistent respiratory acidosis that could not be improved with ventilator titration and required paralytics. Pt also developed constipation which mildly improved with disimpaction. Pt also developed septic shock of unclear etiology. After palliative discussions with family it was decided to electively extubate the patient with comfort care on dilaudid drip. The pt was electively extubated, developed asystole and was pronounced dead at 18:51 PM on June 2. Family notified via phone. Autopsy declined. Attending notified.

## 2017-06-02 NOTE — PROGRESS NOTE ADULT - ASSESSMENT
82M with ESRD on HD MWF, CAD, HTN, DM, COPD (supposed to be on home O2 but pt does not use, also likely HANY), diastolic CHF (EF 65%), HLD, anemia of chronic disease, ?"liver problems" and hypothyroidism admitted for COPD exacerbation in setting of parainfluenza virus. Transferred to MICU for hypercarbic respiratory failure and intubated, remains acidotic and hypercarbic, now with sepsis likely due to pneumonia. Pt now DNR.     #Neuro- sedated on propofol     #CV: increased pressor requirements on levophed  - HFpEF: ASA held due to bleeding oropharyngeal bleeding, holding BP meds Coreg, amlodipine and Imdur    #Pulm: intubated with hypercarbia, mixed respiratory and metabolic acidosis now with septic shock due to pneumonia  - COPD exacerbation in setting of parainfluenza: IV solumedrol, albuterol and ipratropium Q6hrs. Completed course of azithromycin.  - Continue meropenem and vancomycin by level  - Follow up blood and sputum cx    #GI: Continued abodminal distension, stopped feeds due to large residuals  - s/p disimpaction and enemas, unable to tolerate po bowel regimen  - PPI due to upper GI bleeding ASA held    #Renal: ESRD on HD, could not tolerate HD yesterday due to pressor requirement per renal    #ID: parainfluenza+, on meropenem and vancomycin by level for septic shock due to likely bacterial pnuemonia    #Endocrine: FS  well controlled, continue NPH.    # Palliative Care: Goals of care discussed with family and son who is health care proxy. Pt has DNR order and would not have wanted prolonged intubation per family. Will discuss possible terminal extubation.     #PPx: HSQ 82M with ESRD on HD MWF, CAD, HTN, DM, COPD (supposed to be on home O2 but pt does not use, also likely HANY), diastolic CHF (EF 65%), HLD, anemia of chronic disease, ?"liver problems" and hypothyroidism admitted for COPD exacerbation in setting of parainfluenza virus. Transferred to MICU for hypercarbic respiratory failure and intubated, remains acidotic and hypercarbic, now with sepsis likely due to HCAP. Pt now DNR.     #Neuro- sedated on propofol     #CV: increased pressor requirements on levophed  - HFpEF: ASA held due to bleeding oropharyngeal bleeding, holding BP meds Coreg, amlodipine and Imdur  - On levophed 0.38    #Pulm: intubated with hypercarbia, mixed respiratory and metabolic acidosis now with septic shock due to pneumonia  - COPD exacerbation in setting of parainfluenza: IV solumedrol, albuterol and ipratropium Q6hrs. Completed course of azithromycin.  - Continue meropenem and vancomycin by level  - Follow up blood and sputum cx    #GI: Continued abdominal distension, stopped feeds due to large residuals  - s/p disimpaction and enemas, unable to tolerate po bowel regimen  - PPI due to upper GI bleeding ASA held    #Renal: ESRD on HD, could not tolerate HD yesterday due to pressor requirement per renal    #ID: parainfluenza+, on meropenem and vancomycin by level for septic shock due to likely bacterial pnuemonia    #Endocrine: FS  well controlled, continue NPH.    # Heme: anemia of chronic disease    # Palliative Care: Goals of care discussed with family and son who is health care proxy. Pt has DNR order and would not have wanted prolonged intubation per family. Will discuss possible terminal extubation.     #PPx: HSQ 82M with ESRD on HD MWF, CAD, HTN, DM, COPD (supposed to be on home O2 but pt does not use, also likely HANY), diastolic CHF (EF 65%), HLD, anemia of chronic disease, ?"liver problems" and hypothyroidism admitted for COPD exacerbation in setting of parainfluenza virus. Transferred to MICU for hypercarbic respiratory failure and intubated, remains acidotic and hypercarbic, now with sepsis likely due to HCAP. Pt now DNR.     #Neuro - sedated on propofol     #CV: increased pressor requirements on levophed  - HFpEF: ASA held due to bleeding oropharyngeal bleeding, holding BP meds Coreg, amlodipine and Imdur  - On levophed 0.38    #Pulm: intubated with hypercarbia, mixed respiratory and metabolic acidosis now with septic shock due to pneumonia  - COPD exacerbation in setting of parainfluenza: IV solumedrol, albuterol and ipratropium Q6hrs. Completed course of azithromycin.  - Continue meropenem and vancomycin by level  - Follow up blood and sputum cx    #GI: Continued abdominal distension, stopped feeds due to large residuals  - s/p disimpaction and enemas, unable to tolerate po bowel regimen  - PPI due to upper GI bleeding ASA held    #Renal: ESRD on HD, could not tolerate HD yesterday due to pressor requirement per renal    #ID: parainfluenza+, on meropenem and vancomycin by level for septic shock due to likely bacterial pnuemonia    #Endocrine: FS  well controlled, continue NPH.    # Heme: anemia of chronic disease    # Palliative Care: Goals of care discussed with family and son who is health care proxy. Pt has DNR order and would not have wanted prolonged intubation per family. Will discuss possible terminal extubation.     #PPx: HSQ 82M with ESRD on HD MWF, CAD, HTN, DM, COPD (supposed to be on home O2 but pt does not use, also likely HANY), diastolic CHF (EF 65%), HLD, anemia of chronic disease, ?"liver problems" and hypothyroidism admitted for COPD exacerbation in setting of parainfluenza virus. Transferred to MICU for hypercarbic respiratory failure and intubated, remains acidotic and hypercarbic, now with sepsis likely due to HCAP. Pt now DNR.     #Neuro - sedated on propofol     #CV: increased pressor requirements on levophed  - HFpEF: ASA held due to bleeding oropharyngeal bleeding, holding BP meds Coreg, amlodipine and Imdur  - On levophed 0.38    #Pulm: intubated with hypercarbia, mixed respiratory and metabolic acidosis now with septic shock due to pneumonia  - COPD exacerbation in setting of parainfluenza: IV solumedrol, albuterol and ipratropium Q6hrs. Completed course of azithromycin.  - Continue meropenem and vancomycin by level  - Follow up blood and sputum cx    #GI: Continued abdominal distension, stopped feeds due to large residuals  - s/p disimpaction and enemas, unable to tolerate po bowel regimen  - PPI due to upper GI bleeding ASA held    #Renal: ESRD on HD  - could not tolerate HD yesterday due to pressor requirement per renal  - continue bicarbonate drip    #ID: parainfluenza+, on meropenem and vancomycin by level for septic shock due to likely bacterial pnuemonia    #Endocrine: FS  well controlled, continue NPH.    # Heme: anemia of chronic disease    # Palliative Care: Goals of care discussed with family and son who is health care proxy. Pt has DNR order and would not have wanted prolonged intubation per family. Will discuss possible terminal extubation.     #PPx: HSQ

## 2017-06-02 NOTE — PROGRESS NOTE ADULT - SUBJECTIVE AND OBJECTIVE BOX
Dr. Stacia Parnell  NEPHROLOGY-[facility] [phone]        Patient seen and examined @ bedside.  Intubated/sedated.  Family updated at bedside        MEDICATIONS  (STANDING):  propofol Infusion 50MICROgram(s)/kG/Min IV Continuous <Continuous>  heparin  Injectable 5000Unit(s) SubCutaneous every 8 hours  ipratropium 17 MICROgram(s) HFA Inhaler 1Puff(s) Inhalation every 6 hours  chlorhexidine 4% Liquid 1Application(s) Topical daily  ALBUTerol    90 MICROgram(s) HFA Inhaler 2Puff(s) Inhalation every 6 hours  sodium chloride 3%  Inhalation 3milliLiter(s) Inhalation every 8 hours  dextrose 5%. 1000milliLiter(s) IV Continuous <Continuous>  dextrose 50% Injectable 12.5Gram(s) IV Push once  dextrose 50% Injectable 25Gram(s) IV Push once  dextrose 50% Injectable 25Gram(s) IV Push once  insulin lispro (HumaLOG) corrective regimen sliding scale  SubCutaneous every 6 hours  insulin NPH human recombinant 9Unit(s) SubCutaneous every 6 hours  cisatracurium Infusion 1MICROgram(s)/kG/Min IV Continuous <Continuous>  midazolam Infusion 3mG/Hr IV Continuous <Continuous>  meropenem IVPB  IV Intermittent   meropenem IVPB 500milliGRAM(s) IV Intermittent every 24 hours  norepinephrine Infusion 0.05MICROgram(s)/kG/Min IV Continuous <Continuous>  methylPREDNISolone sodium succinate Injectable 40milliGRAM(s) IV Push daily  pantoprazole  Injectable 40milliGRAM(s) IV Push daily  petrolatum Ophthalmic Ointment 1Application(s) Both EYES two times a day  levothyroxine Injectable 37.5MICROGram(s) IV Push daily  dextrose 5% 1000milliLiter(s) IV Continuous <Continuous>      VITAL:  T(C): , Max: 39.4 (06-02 @ 00:00)  T(F): , Max: 103 (06-02 @ 00:00)  HR: 101  BP: 128/58  BP(mean): 74  RR: 16  SpO2: 98%  Wt(kg): --    I and O's:  I & Os for 24h ending 06-02 @ 07:00  =============================================  IN: 2173.9 ml / OUT: 400 ml / NET: 1773.9 ml    I & Os for current day (as of 06-02 @ 14:28)  =============================================  IN: 761.8 ml / OUT: 0 ml / NET: 761.8 ml        PHYSICAL EXAM:    Constitutional: Intubated/sedated  HEENT: PERRLA    Neck:  No JVD  Respiratory: decreased breath sounds  Cardiovascular: S1 and S2  Gastrointestinal: BS+, soft, NT/ND +OGT  Extremities: No peripheral edema  : +hameed  Skin: No rashes  Access: Not applicable    LABS:                        10.2   16.41 )-----------( 143      ( 02 Jun 2017 03:20 )             31.7     06-02    137  | 89<L> |     92<H>  --------------------------------------------<  176<H>  5.4<H>| 34<H>|  5.41<H>    Ca    7.3<L>      02 Jun 2017 03:20  Phos  9.1     06-02  Mg     2.7     06-02    TPro  5.9<L>  /  Alb  2.7<L>  /  TBili  0.8  /  DBili  x   /  AST  42<H>  /  ALT  16  /  AlkPhos  56  06-02          Urine Studies:          RADIOLOGY & ADDITIONAL STUDIES:  EXAM:  RAD CHEST PORTABLE URGENT        PROCEDURE DATE:  Jun 1 2017         INTERPRETATION:  TIME OF EXAM: June 1, 2017 at 7:21 AM.    CLINICAL INFORMATION: Right IJ line placement.    COMPARISON:  May 27, 2017.    TECHNIQUE:   AP Portable chest x-ray.    INTERPRETATION:     The heart is not enlarged.  ET tube tip is above the bernadette.  Enteric tube extends into the abdomen. The end is not included on this   image.  There is a right IJ line with tip in the SVC. No retained guidewire or   pneumothorax is seen.  There is patchy opacity in the lower right lung. The left lung is clear.  No pleural effusion is seen.

## 2017-06-02 NOTE — PROGRESS NOTE ADULT - PROBLEM SELECTOR PLAN 2
pt w/ mixed acidosis picture.  remains acidotic on ABG at 7.13 w/ hypercarbia and metabolic acidosis.  On 3 amps HCO3 at present   remains intubated/sedated/paralyzed.  On IV steroids as well as per MICU  The family may want to terminally wean the patient.  According to the family he never even wanted intubation pt w/ mixed acidosis picture.  remains acidotic on ABG at 7.13 w/ hypercarbia and metabolic acidosis.  On 3 amps HCO3 at present   remains intubated/sedated/paralyzed.  On IV steroids as well as per MICU  The family may want to terminally wean the patient.  According to the family he never even wanted intubation.  They may want to entertain terminal wean today

## 2017-06-02 NOTE — PROGRESS NOTE ADULT - PROBLEM SELECTOR PROBLEM 1
COPD (chronic obstructive pulmonary disease)
COPD (chronic obstructive pulmonary disease)
ESRD (end stage renal disease)
ESRD on dialysis
COPD (chronic obstructive pulmonary disease)
ESRD on dialysis
Respiratory failure requiring intubation
COPD (chronic obstructive pulmonary disease)

## 2017-06-02 NOTE — PROGRESS NOTE ADULT - SUBJECTIVE AND OBJECTIVE BOX
CHIEF COMPLAINT: Patient is a 82y old  Male who presents with a chief complaint of   Interval Events:    REVIEW OF SYSTEMS:  Constitutional:   Eyes:  ENT:  CV:  Resp:  GI:  :  MSK:  Integumentary:  Neurological:  Psychiatric:  Endocrine:  Hematologic/Lymphatic:  Allergic/Immunologic:  [ ] All other systems negative  [ x ] Unable to assess ROS because intubated    OBJECTIVE:  ICU Vital Signs Last 24 Hrs  T(C): 39.4, Max: 39.4 (06-02 @ 00:00)  T(F): 103, Max: 103 (06-02 @ 00:00)  HR: 99 (81 - 121)  BP: 113/52 (81/50 - 120/42)  BP(mean): 67 (55 - 91)  ABP: --  ABP(mean): --  RR: 16 (0 - 20)  SpO2: 98% (94% - 98%)    Mode: AC/ CMV (Assist Control/ Continuous Mandatory Ventilation), RR (machine): 16, TV (machine): 450, FiO2: 40, PEEP: 5, MAP: 11.1, PIP: 41    I & Os for current day (as of 06-02 @ 07:47)  =============================================  IN: 2063.4 ml / OUT: 400 ml / NET: 1663.4 ml    CAPILLARY BLOOD GLUCOSE  186 (02 Jun 2017 05:00)    General: sedated, intubated  HEENT: nonbloody secretions  Lymph Nodes: no lymphadenopathy  Neck: no JVD  Respiratory: mild rhonchi b/l  Cardiovascular: RRR, no murmurs  Abdomen: distended, hypoactive BS  Extremities: trace edema  Skin: no rash  Neurological: sedated  Psychiatry:      HOSPITAL MEDICATIONS:  MEDICATIONS  (STANDING):  propofol Infusion 50MICROgram(s)/kG/Min IV Continuous <Continuous>  heparin  Injectable 5000Unit(s) SubCutaneous every 8 hours  ipratropium 17 MICROgram(s) HFA Inhaler 1Puff(s) Inhalation every 6 hours  chlorhexidine 4% Liquid 1Application(s) Topical daily  ALBUTerol    90 MICROgram(s) HFA Inhaler 2Puff(s) Inhalation every 6 hours  sodium chloride 3%  Inhalation 3milliLiter(s) Inhalation every 8 hours  dextrose 5%. 1000milliLiter(s) IV Continuous <Continuous>  dextrose 50% Injectable 12.5Gram(s) IV Push once  dextrose 50% Injectable 25Gram(s) IV Push once  dextrose 50% Injectable 25Gram(s) IV Push once  insulin lispro (HumaLOG) corrective regimen sliding scale  SubCutaneous every 6 hours  insulin NPH human recombinant 9Unit(s) SubCutaneous every 6 hours  cisatracurium Infusion 1MICROgram(s)/kG/Min IV Continuous <Continuous>  midazolam Infusion 3mG/Hr IV Continuous <Continuous>  meropenem IVPB  IV Intermittent   meropenem IVPB 500milliGRAM(s) IV Intermittent every 24 hours  norepinephrine Infusion 0.05MICROgram(s)/kG/Min IV Continuous <Continuous>  methylPREDNISolone sodium succinate Injectable 40milliGRAM(s) IV Push daily  pantoprazole  Injectable 40milliGRAM(s) IV Push daily  petrolatum Ophthalmic Ointment 1Application(s) Both EYES two times a day  levothyroxine Injectable 37.5MICROGram(s) IV Push daily  dextrose 5% 1000milliLiter(s) IV Continuous <Continuous>    MEDICATIONS  (PRN):  dextrose Gel 1Dose(s) Oral once PRN Blood Glucose LESS THAN 70 milliGRAM(s)/deciliter  glucagon  Injectable 1milliGRAM(s) IntraMuscular once PRN Glucose LESS THAN 70 milligrams/deciliter      LABS:  (06-02 @ 03:20)                        10.2  16.41 )-----------( 143                 31.7    Neutrophils = 14.09 (85.8%)  Lymphocytes = 0.72 (4.4%)  Eosinophils = 0.00 (0.0%)  Basophils = 0.03 (0.2%)  Monocytes = 1.41 (8.6%)  Bands = --%    WBC Trend: 16.41<--, 12.26<--, 8.09<--  Hb Trend: 10.2<--, 10.9<--, 11.0<--, 10.8<--, 10.2<--  Plt Trend: 143<--, 111<--, 84<--, 82<--, 98<--  06-02    137  |  89<L>  |  92<H>  ----------------------------<  176<H>  5.4<H>   |  34<H>  |  5.41<H>    Ca    7.3<L>      02 Jun 2017 03:20  Phos  9.1     06-02  Mg     2.7     06-02    TPro  5.9<L>  /  Alb  2.7<L>  /  TBili  0.8  /  DBili  x   /  AST  42<H>  /  ALT  16  /  AlkPhos  56  06-02    Creatinine Trend: 5.41<--, 4.28<--, 2.99<--, 2.65<--, 4.12<--, 5.02<--  PT/INR - ( 02 Jun 2017 03:20 )   PT: 12.8 SEC;   INR: 1.14              Arterial Blood Gas:  06-01 @ 19:33  7.19/66/66/21/87.8/-2.9  ABG lactate: 1.2  Arterial Blood Gas:  06-01 @ 05:10  7.16/60/148/18/98.1/-6.7  ABG lactate: 1.6  Arterial Blood Gas:  06-01 @ 03:30  7.17/66/54/19/78.4/-4.6  ABG lactate: --  Arterial Blood Gas:  05-31 @ 13:27  7.27/51/52/21/83.3/-3.4  ABG lactate: --            MICROBIOLOGY:   Blood Cx:  Urine Cx:  Sputum Cx:  Legionella:  RVP:    RADIOLOGY:  X Ray:  CT:  MRI:  Ultrasound:  [ ] Reviewed and interpreted by me    EKG: CHIEF COMPLAINT: Dyspnea  Interval Events:    REVIEW OF SYSTEMS:  Constitutional:   Eyes:  ENT:  CV:  Resp:  GI:  :  MSK:  Integumentary:  Neurological:  Psychiatric:  Endocrine:  Hematologic/Lymphatic:  Allergic/Immunologic:  [ ] All other systems negative  [ x ] Unable to assess ROS because intubated    OBJECTIVE:  ICU Vital Signs Last 24 Hrs  T(C): 39.4, Max: 39.4 (06-02 @ 00:00)  T(F): 103, Max: 103 (06-02 @ 00:00)  HR: 99 (81 - 121)  BP: 113/52 (81/50 - 120/42)  BP(mean): 67 (55 - 91)  ABP: --  ABP(mean): --  RR: 16 (0 - 20)  SpO2: 98% (94% - 98%)    Mode: AC/ CMV (Assist Control/ Continuous Mandatory Ventilation), RR (machine): 16, TV (machine): 450, FiO2: 40, PEEP: 5, MAP: 11.1, PIP: 41    I & Os for current day (as of 06-02 @ 07:47)  =============================================  IN: 2063.4 ml / OUT: 400 ml / NET: 1663.4 ml    CAPILLARY BLOOD GLUCOSE  186 (02 Jun 2017 05:00)    General: sedated, intubated  HEENT: nonbloody secretions  Lymph Nodes: no lymphadenopathy  Neck: no JVD  Respiratory: mild rhonchi b/l  Cardiovascular: RRR, no murmurs  Abdomen: distended, hypoactive BS  Extremities: trace edema  Skin: no rash  Neurological: sedated  Psychiatry:      HOSPITAL MEDICATIONS:  MEDICATIONS  (STANDING):  propofol Infusion 50MICROgram(s)/kG/Min IV Continuous <Continuous>  heparin  Injectable 5000Unit(s) SubCutaneous every 8 hours  ipratropium 17 MICROgram(s) HFA Inhaler 1Puff(s) Inhalation every 6 hours  chlorhexidine 4% Liquid 1Application(s) Topical daily  ALBUTerol    90 MICROgram(s) HFA Inhaler 2Puff(s) Inhalation every 6 hours  sodium chloride 3%  Inhalation 3milliLiter(s) Inhalation every 8 hours  dextrose 5%. 1000milliLiter(s) IV Continuous <Continuous>  dextrose 50% Injectable 12.5Gram(s) IV Push once  dextrose 50% Injectable 25Gram(s) IV Push once  dextrose 50% Injectable 25Gram(s) IV Push once  insulin lispro (HumaLOG) corrective regimen sliding scale  SubCutaneous every 6 hours  insulin NPH human recombinant 9Unit(s) SubCutaneous every 6 hours  cisatracurium Infusion 1MICROgram(s)/kG/Min IV Continuous <Continuous>  midazolam Infusion 3mG/Hr IV Continuous <Continuous>  meropenem IVPB  IV Intermittent   meropenem IVPB 500milliGRAM(s) IV Intermittent every 24 hours  norepinephrine Infusion 0.05MICROgram(s)/kG/Min IV Continuous <Continuous>  methylPREDNISolone sodium succinate Injectable 40milliGRAM(s) IV Push daily  pantoprazole  Injectable 40milliGRAM(s) IV Push daily  petrolatum Ophthalmic Ointment 1Application(s) Both EYES two times a day  levothyroxine Injectable 37.5MICROGram(s) IV Push daily  dextrose 5% 1000milliLiter(s) IV Continuous <Continuous>    MEDICATIONS  (PRN):  dextrose Gel 1Dose(s) Oral once PRN Blood Glucose LESS THAN 70 milliGRAM(s)/deciliter  glucagon  Injectable 1milliGRAM(s) IntraMuscular once PRN Glucose LESS THAN 70 milligrams/deciliter      LABS:  (06-02 @ 03:20)                        10.2  16.41 )-----------( 143                 31.7    Neutrophils = 14.09 (85.8%)  Lymphocytes = 0.72 (4.4%)  Eosinophils = 0.00 (0.0%)  Basophils = 0.03 (0.2%)  Monocytes = 1.41 (8.6%)  Bands = --%    WBC Trend: 16.41<--, 12.26<--, 8.09<--  Hb Trend: 10.2<--, 10.9<--, 11.0<--, 10.8<--, 10.2<--  Plt Trend: 143<--, 111<--, 84<--, 82<--, 98<--  06-02    137  |  89<L>  |  92<H>  ----------------------------<  176<H>  5.4<H>   |  34<H>  |  5.41<H>    Ca    7.3<L>      02 Jun 2017 03:20  Phos  9.1     06-02  Mg     2.7     06-02    TPro  5.9<L>  /  Alb  2.7<L>  /  TBili  0.8  /  DBili  x   /  AST  42<H>  /  ALT  16  /  AlkPhos  56  06-02    Creatinine Trend: 5.41<--, 4.28<--, 2.99<--, 2.65<--, 4.12<--, 5.02<--  PT/INR - ( 02 Jun 2017 03:20 )   PT: 12.8 SEC;   INR: 1.14              Arterial Blood Gas:  06-01 @ 19:33  7.19/66/66/21/87.8/-2.9  ABG lactate: 1.2  Arterial Blood Gas:  06-01 @ 05:10  7.16/60/148/18/98.1/-6.7  ABG lactate: 1.6  Arterial Blood Gas:  06-01 @ 03:30  7.17/66/54/19/78.4/-4.6  ABG lactate: --  Arterial Blood Gas:  05-31 @ 13:27  7.27/51/52/21/83.3/-3.4  ABG lactate: --            MICROBIOLOGY:   Blood Cx:  Urine Cx:  Sputum Cx:  Legionella:  RVP:    RADIOLOGY:  X Ray:  CT:  MRI:  Ultrasound:  [ ] Reviewed and interpreted by me    EKG: CHIEF COMPLAINT: Dyspnea    Interval Events: Pt febrile overnight to 103 on meropenem and vancomycin, improved with cooling blanket. Rising WBC.     REVIEW OF SYSTEMS:  Constitutional:   Eyes:  ENT:  CV:  Resp:  GI:  :  MSK:  Integumentary:  Neurological:  Psychiatric:  Endocrine:  Hematologic/Lymphatic:  Allergic/Immunologic:  [ ] All other systems negative  [ x ] Unable to assess ROS because intubated    OBJECTIVE:  ICU Vital Signs Last 24 Hrs  T(C): 39.4, Max: 39.4 (06-02 @ 00:00)  T(F): 103, Max: 103 (06-02 @ 00:00)  HR: 99 (81 - 121)  BP: 113/52 (81/50 - 120/42)  BP(mean): 67 (55 - 91)  ABP: --  ABP(mean): --  RR: 16 (0 - 20)  SpO2: 98% (94% - 98%)    Mode: AC/ CMV (Assist Control/ Continuous Mandatory Ventilation), RR (machine): 16, TV (machine): 450, FiO2: 40, PEEP: 5, MAP: 11.1, PIP: 41    I & Os for current day (as of 06-02 @ 07:47)  =============================================  IN: 2063.4 ml / OUT: 400 ml / NET: 1663.4 ml    CAPILLARY BLOOD GLUCOSE  186 (02 Jun 2017 05:00)    General: sedated, intubated  HEENT: nonbloody secretions  Lymph Nodes: no lymphadenopathy  Neck: no JVD  Respiratory: mild rhonchi b/l  Cardiovascular: RRR, no murmurs  Abdomen: distended, hypoactive BS  Extremities: trace edema  Skin: no rash  Neurological: sedated  Psychiatry:      HOSPITAL MEDICATIONS:  MEDICATIONS  (STANDING):  propofol Infusion 50MICROgram(s)/kG/Min IV Continuous <Continuous>  heparin  Injectable 5000Unit(s) SubCutaneous every 8 hours  ipratropium 17 MICROgram(s) HFA Inhaler 1Puff(s) Inhalation every 6 hours  chlorhexidine 4% Liquid 1Application(s) Topical daily  ALBUTerol    90 MICROgram(s) HFA Inhaler 2Puff(s) Inhalation every 6 hours  sodium chloride 3%  Inhalation 3milliLiter(s) Inhalation every 8 hours  dextrose 5%. 1000milliLiter(s) IV Continuous <Continuous>  dextrose 50% Injectable 12.5Gram(s) IV Push once  dextrose 50% Injectable 25Gram(s) IV Push once  dextrose 50% Injectable 25Gram(s) IV Push once  insulin lispro (HumaLOG) corrective regimen sliding scale  SubCutaneous every 6 hours  insulin NPH human recombinant 9Unit(s) SubCutaneous every 6 hours  cisatracurium Infusion 1MICROgram(s)/kG/Min IV Continuous <Continuous>  midazolam Infusion 3mG/Hr IV Continuous <Continuous>  meropenem IVPB  IV Intermittent   meropenem IVPB 500milliGRAM(s) IV Intermittent every 24 hours  norepinephrine Infusion 0.05MICROgram(s)/kG/Min IV Continuous <Continuous>  methylPREDNISolone sodium succinate Injectable 40milliGRAM(s) IV Push daily  pantoprazole  Injectable 40milliGRAM(s) IV Push daily  petrolatum Ophthalmic Ointment 1Application(s) Both EYES two times a day  levothyroxine Injectable 37.5MICROGram(s) IV Push daily  dextrose 5% 1000milliLiter(s) IV Continuous <Continuous>    MEDICATIONS  (PRN):  dextrose Gel 1Dose(s) Oral once PRN Blood Glucose LESS THAN 70 milliGRAM(s)/deciliter  glucagon  Injectable 1milliGRAM(s) IntraMuscular once PRN Glucose LESS THAN 70 milligrams/deciliter      LABS:  (06-02 @ 03:20)                        10.2  16.41 )-----------( 143                 31.7    Neutrophils = 14.09 (85.8%)  Lymphocytes = 0.72 (4.4%)  Eosinophils = 0.00 (0.0%)  Basophils = 0.03 (0.2%)  Monocytes = 1.41 (8.6%)  Bands = --%    WBC Trend: 16.41<--, 12.26<--, 8.09<--  Hb Trend: 10.2<--, 10.9<--, 11.0<--, 10.8<--, 10.2<--  Plt Trend: 143<--, 111<--, 84<--, 82<--, 98<--  06-02    137  |  89<L>  |  92<H>  ----------------------------<  176<H>  5.4<H>   |  34<H>  |  5.41<H>    Ca    7.3<L>      02 Jun 2017 03:20  Phos  9.1     06-02  Mg     2.7     06-02    TPro  5.9<L>  /  Alb  2.7<L>  /  TBili  0.8  /  DBili  x   /  AST  42<H>  /  ALT  16  /  AlkPhos  56  06-02    Creatinine Trend: 5.41<--, 4.28<--, 2.99<--, 2.65<--, 4.12<--, 5.02<--  PT/INR - ( 02 Jun 2017 03:20 )   PT: 12.8 SEC;   INR: 1.14              Arterial Blood Gas:  06-01 @ 19:33  7.19/66/66/21/87.8/-2.9  ABG lactate: 1.2  Arterial Blood Gas:  06-01 @ 05:10  7.16/60/148/18/98.1/-6.7  ABG lactate: 1.6  Arterial Blood Gas:  06-01 @ 03:30  7.17/66/54/19/78.4/-4.6  ABG lactate: --  Arterial Blood Gas:  05-31 @ 13:27  7.27/51/52/21/83.3/-3.4  ABG lactate: --            MICROBIOLOGY:   Blood Cx:  Urine Cx:  Sputum Cx:  Legionella:  RVP:    RADIOLOGY:  X Ray:  CT:  MRI:  Ultrasound:  [ ] Reviewed and interpreted by me    EKG:

## 2017-06-02 NOTE — PROGRESS NOTE ADULT - PROBLEM SELECTOR PROBLEM 4
ESRD on dialysis
ESRD on dialysis
Feeding problems
ESRD on dialysis
Feeding problems
HTN (hypertension)
ESRD on dialysis

## 2017-06-02 NOTE — PROGRESS NOTE ADULT - ATTENDING COMMENTS
Severe hypercapneaic resp failure on vebnt from parainflu and copd/very guarded
Severe hypercapneaic resp failure on vent from parainflu and copd  I HAVE SPOKEN TO HIS son and wife about his serioous illness
Resp failure/hypercapneac/copd exac/on vent/guarded
Serious resp failure from hypercapnea/copd/on vent guarded
Severe like severe hypercapneac resp failure/copd/on vent /very guarded
he is really sick with MODS, on vent, shock state, with terrible heart/very very guarded
Severe COPD, ESRD on HD  Parainfluenza positive  Now pneumonia  Very difficult to ventilate on Nimbex.  Family now agrees to palliative wean.
Patient has been doing poorly , and now have increasing pressor requirement, and no HD secondary to shock  Overall the patient si doing pretty poorly and critically ill

## 2017-06-03 LAB
-  AMIKACIN: SIGNIFICANT CHANGE UP
-  AMPICILLIN/SULBACTAM: SIGNIFICANT CHANGE UP
-  AMPICILLIN: SIGNIFICANT CHANGE UP
-  AZTREONAM: SIGNIFICANT CHANGE UP
-  CEFAZOLIN: SIGNIFICANT CHANGE UP
-  CEFEPIME: SIGNIFICANT CHANGE UP
-  CEFOXITIN: SIGNIFICANT CHANGE UP
-  CEFTAZIDIME: SIGNIFICANT CHANGE UP
-  CEFTRIAXONE: SIGNIFICANT CHANGE UP
-  CIPROFLOXACIN: SIGNIFICANT CHANGE UP
-  ERTAPENEM: SIGNIFICANT CHANGE UP
-  GENTAMICIN: SIGNIFICANT CHANGE UP
-  IMIPENEM: SIGNIFICANT CHANGE UP
-  LEVOFLOXACIN: SIGNIFICANT CHANGE UP
-  MEROPENEM: SIGNIFICANT CHANGE UP
-  PIPERACILLIN/TAZOBACTAM: SIGNIFICANT CHANGE UP
-  TIGECYCLINE: SIGNIFICANT CHANGE UP
-  TOBRAMYCIN: SIGNIFICANT CHANGE UP
-  TRIMETHOPRIM/SULFAMETHOXAZOLE: SIGNIFICANT CHANGE UP
BACTERIA SPT RESP CULT: SIGNIFICANT CHANGE UP
GRAM STN SPT: SIGNIFICANT CHANGE UP
METHOD TYPE: SIGNIFICANT CHANGE UP
ORGANISM # SPEC MICROSCOPIC CNT: SIGNIFICANT CHANGE UP
ORGANISM # SPEC MICROSCOPIC CNT: SIGNIFICANT CHANGE UP

## 2017-06-06 LAB — BACTERIA BLD CULT: SIGNIFICANT CHANGE UP

## 2017-12-11 NOTE — PROGRESS NOTE ADULT - PROBLEM SELECTOR PLAN 1
Message left for Pt that a 30 day Rx refill was authorized, and pt should schedule a non fasting OV.    on iv steroids with bronchodilators: severely acidotic: with permissive hypercapnia: finished antibiotics  Still persistently hypercarbic with severe acidosis: high peak air way pressures, not able to correct with various ventilator settings: cont MICU  care: Today PH is with improvement

## 2018-01-16 NOTE — CONSULT NOTE ADULT - VASCULAR
Message   Task to [facility]- tissue transglutaminase IgG a greater than 100 and TTG IgG 11  Patient likely has celiac disease we need to arrange for upper endoscopy     Verified Results  (1) TISSUE TRANSGLUTAMINASE, IGG/IGA [id number] 02:14PM [facility] Order Number: [id number]     Test Name Result Flag Reference   tTG IGG 11 U/mL H 0 - 5   Negative        0 - 5                                Weak Positive   6 - 9                                Positive           >9    Performed at:  [email or web address] [place] [address]  [id number]  : Hebert Mackey MD, Phone:  [phone]   tTG IGA >100 U/mL H 0 - 3   Negative        0 -  3                                Weak Positive   4 - 10                                Positive           >10   Tissue Transglutaminase (tTG) has been identified   as the endomysial antigen  Studies have demonstr-   ated that endomysial IgA antibodies have over 99%   specificity for gluten sensitive enteropathy 
Equal and normal pulses (carotid, femoral, dorsalis pedis)

## 2018-06-25 NOTE — PATIENT PROFILE ADULT. - NUTRITION PROFILE
Perilesional Excision Additional Text (Leave Blank If You Do Not Want): The margin was drawn around the clinically apparent lesion. Incisions were then made along these lines to the appropriate tissue plane and the lesion was extirpated. Complex Repair And W Plasty Text: The defect edges were debeveled with a #15 scalpel blade.  The primary defect was closed partially with a complex linear closure.  Given the location of the remaining defect, shape of the defect and the proximity to free margins a W plasty was deemed most appropriate for complete closure of the defect.  Using a sterile surgical marker, an appropriate advancement flap was drawn incorporating the defect and placing the expected incisions within the relaxed skin tension lines where possible.    The area thus outlined was incised deep to adipose tissue with a #15 scalpel blade.  The skin margins were undermined to an appropriate distance in all directions utilizing iris scissors. V-Y Plasty Text: The defect edges were debeveled with a #15 scalpel blade.  Given the location of the defect, shape of the defect and the proximity to free margins an V-Y advancement flap was deemed most appropriate.  Using a sterile surgical marker, an appropriate advancement flap was drawn incorporating the defect and placing the expected incisions within the relaxed skin tension lines where possible.    The area thus outlined was incised deep to adipose tissue with a #15 scalpel blade.  The skin margins were undermined to an appropriate distance in all directions utilizing iris scissors. Crescentic Intermediate Repair Preamble Text (Leave Blank If You Do Not Want): Undermining was performed with blunt dissection. No Repair - Repaired With Adjacent Surgical Defect Text (Leave Blank If You Do Not Want): After the excision the defect was repaired concurrently with another surgical defect which was in close approximation. Ear Star Wedge Flap Text: The defect edges were debeveled with a #15 blade scalpel.  Given the location of the defect and the proximity to free margins (helical rim) an ear star wedge flap was deemed most appropriate.  Using a sterile surgical marker, the appropriate flap was drawn incorporating the defect and placing the expected incisions between the helical rim and antihelix where possible.  The area thus outlined was incised through and through with a #15 scalpel blade. Secondary Defect Width (In Cm): 0 Anesthesia Volume In Cc: 9 O-L Flap Text: The defect edges were debeveled with a #15 scalpel blade.  Given the location of the defect, shape of the defect and the proximity to free margins an O-L flap was deemed most appropriate.  Using a sterile surgical marker, an appropriate advancement flap was drawn incorporating the defect and placing the expected incisions within the relaxed skin tension lines where possible.    The area thus outlined was incised deep to adipose tissue with a #15 scalpel blade.  The skin margins were undermined to an appropriate distance in all directions utilizing iris scissors. Mastoid Interpolation Flap Text: A decision was made to reconstruct the defect utilizing an interpolation axial flap and a staged reconstruction.  A telfa template was made of the defect.  This telfa template was then used to outline the mastoid interpolation flap.  The donor area for the pedicle flap was then injected with anesthesia.  The flap was excised through the skin and subcutaneous tissue down to the layer of the underlying musculature.  The pedicle flap was carefully excised within this deep plane to maintain its blood supply.  The edges of the donor site were undermined.   The donor site was closed in a primary fashion.  The pedicle was then rotated into position and sutured.  Once the tube was sutured into place, adequate blood supply was confirmed with blanching and refill.  The pedicle was then wrapped with xeroform gauze and dressed appropriately with a telfa and gauze bandage to ensure continued blood supply and protect the attached pedicle. Complex Repair And Dorsal Nasal Flap Text: The defect edges were debeveled with a #15 scalpel blade.  The primary defect was closed partially with a complex linear closure.  Given the location of the remaining defect, shape of the defect and the proximity to free margins a dorsal nasal flap was deemed most appropriate for complete closure of the defect.  Using a sterile surgical marker, an appropriate flap was drawn incorporating the defect and placing the expected incisions within the relaxed skin tension lines where possible.    The area thus outlined was incised deep to adipose tissue with a #15 scalpel blade.  The skin margins were undermined to an appropriate distance in all directions utilizing iris scissors. Show Previous Accession Variable: Yes Rhombic Flap Text: The defect edges were debeveled with a #15 scalpel blade.  Given the location of the defect and the proximity to free margins a rhombic flap was deemed most appropriate.  Using a sterile surgical marker, an appropriate rhombic flap was drawn incorporating the defect.    The area thus outlined was incised deep to adipose tissue with a #15 scalpel blade.  The skin margins were undermined to an appropriate distance in all directions utilizing iris scissors. Dressing: pressure dressing with telfa Complex Repair And Dermal Autograft Text: The defect edges were debeveled with a #15 scalpel blade.  The primary defect was closed partially with a complex linear closure.  Given the location of the defect, shape of the defect and the proximity to free margins an dermal autograft was deemed most appropriate to repair the remaining defect.  The graft was trimmed to fit the size of the remaining defect.  The graft was then placed in the primary defect, oriented appropriately, and sutured into place. Complex Repair And Double Advancement Flap Text: The defect edges were debeveled with a #15 scalpel blade.  The primary defect was closed partially with a complex linear closure.  Given the location of the remaining defect, shape of the defect and the proximity to free margins a double advancement flap was deemed most appropriate for complete closure of the defect.  Using a sterile surgical marker, an appropriate advancement flap was drawn incorporating the defect and placing the expected incisions within the relaxed skin tension lines where possible.    The area thus outlined was incised deep to adipose tissue with a #15 scalpel blade.  The skin margins were undermined to an appropriate distance in all directions utilizing iris scissors. Cheek Interpolation Flap Text: A decision was made to reconstruct the defect utilizing an interpolation axial flap and a staged reconstruction.  A telfa template was made of the defect.  This telfa template was then used to outline the Cheek Interpolation flap.  The donor area for the pedicle flap was then injected with anesthesia.  The flap was excised through the skin and subcutaneous tissue down to the layer of the underlying musculature.  The interpolation flap was carefully excised within this deep plane to maintain its blood supply.  The edges of the donor site were undermined.   The donor site was closed in a primary fashion.  The pedicle was then rotated into position and sutured.  Once the tube was sutured into place, adequate blood supply was confirmed with blanching and refill.  The pedicle was then wrapped with xeroform gauze and dressed appropriately with a telfa and gauze bandage to ensure continued blood supply and protect the attached pedicle. Bill [id number] For Specimen Handling/Conveyance To Laboratory?: no [place] Complex Repair Preamble Text (Leave Blank If You Do Not Want): Extensive wide undermining was performed. X Size Of Lesion In Cm (Optional): 0.6 O-T Advancement Flap Text: The defect edges were debeveled with a #15 scalpel blade.  Given the location of the defect, shape of the defect and the proximity to free margins an O-T advancement flap was deemed most appropriate.  Using a sterile surgical marker, an appropriate advancement flap was drawn incorporating the defect and placing the expected incisions within the relaxed skin tension lines where possible.    The area thus outlined was incised deep to adipose tissue with a #15 scalpel blade.  The skin margins were undermined to an appropriate distance in all directions utilizing iris scissors. Complex Repair And Z Plasty Text: The defect edges were debeveled with a #15 scalpel blade.  The primary defect was closed partially with a complex linear closure.  Given the location of the remaining defect, shape of the defect and the proximity to free margins a Z plasty was deemed most appropriate for complete closure of the defect.  Using a sterile surgical marker, an appropriate advancement flap was drawn incorporating the defect and placing the expected incisions within the relaxed skin tension lines where possible.    The area thus outlined was incised deep to adipose tissue with a #15 scalpel blade.  The skin margins were undermined to an appropriate distance in all directions utilizing iris scissors. Star Wedge Flap Text: The defect edges were debeveled with a #15 scalpel blade.  Given the location of the defect, shape of the defect and the proximity to free margins a star wedge flap was deemed most appropriate.  Using a sterile surgical marker, an appropriate rotation flap was drawn incorporating the defect and placing the expected incisions within the relaxed skin tension lines where possible. The area thus outlined was incised deep to adipose tissue with a #15 scalpel blade.  The skin margins were undermined to an appropriate distance in all directions utilizing iris scissors. Island Pedicle Flap-Requiring Vessel Identification Text: The defect edges were debeveled with a #15 scalpel blade.  Given the location of the defect, shape of the defect and the proximity to free margins an island pedicle advancement flap was deemed most appropriate.  Using a sterile surgical marker, an appropriate advancement flap was drawn, based on the axial vessel mentioned above, incorporating the defect, outlining the appropriate donor tissue and placing the expected incisions within the relaxed skin tension lines where possible.    The area thus outlined was incised deep to adipose tissue with a #15 scalpel blade.  The skin margins were undermined to an appropriate distance in all directions around the primary defect and laterally outward around the island pedicle utilizing iris scissors.  There was minimal undermining beneath the pedicle flap. Spiral Flap Text: The defect edges were debeveled with a #15 scalpel blade.  Given the location of the defect, shape of the defect and the proximity to free margins a spiral flap was deemed most appropriate.  Using a sterile surgical marker, an appropriate rotation flap was drawn incorporating the defect and placing the expected incisions within the relaxed skin tension lines where possible. The area thus outlined was incised deep to adipose tissue with a #15 scalpel blade.  The skin margins were undermined to an appropriate distance in all directions utilizing iris scissors. Advancement Flap (Single) Text: The defect edges were debeveled with a #15 scalpel blade.  Given the location of the defect and the proximity to free margins a single advancement flap was deemed most appropriate.  Using a sterile surgical marker, an appropriate advancement flap was drawn incorporating the defect and placing the expected incisions within the relaxed skin tension lines where possible.    The area thus outlined was incised deep to adipose tissue with a #15 scalpel blade.  The skin margins were undermined to an appropriate distance in all directions utilizing iris scissors. Complex Repair And Double M Plasty Text: The defect edges were debeveled with a #15 scalpel blade.  The primary defect was closed partially with a complex linear closure.  Given the location of the remaining defect, shape of the defect and the proximity to free margins a double M plasty was deemed most appropriate for complete closure of the defect.  Using a sterile surgical marker, an appropriate advancement flap was drawn incorporating the defect and placing the expected incisions within the relaxed skin tension lines where possible.    The area thus outlined was incised deep to adipose tissue with a #15 scalpel blade.  The skin margins were undermined to an appropriate distance in all directions utilizing iris scissors. Trilobed Flap Text: The defect edges were debeveled with a #15 scalpel blade.  Given the location of the defect and the proximity to free margins a trilobed flap was deemed most appropriate.  Using a sterile surgical marker, an appropriate trilobed flap drawn around the defect.    The area thus outlined was incised deep to adipose tissue with a #15 scalpel blade.  The skin margins were undermined to an appropriate distance in all directions utilizing iris scissors. Scalpel Size: 15 blade Split-Thickness Skin Graft Text: The defect edges were debeveled with a #15 scalpel blade.  Given the location of the defect, shape of the defect and the proximity to free margins a split thickness skin graft was deemed most appropriate.  Using a sterile surgical marker, the primary defect shape was transferred to the donor site. The split thickness graft was then harvested.  The skin graft was then placed in the primary defect and oriented appropriately. Partial Purse String (Intermediate) Text: Given the location of the defect and the characteristics of the surrounding skin an intermediate purse string closure was deemed most appropriate.  Undermining was performed circumferentially around the surgical defect.  A purse string suture was then placed and tightened. Wound tension of the circular defect prevented complete closure of the wound. Cartilage Graft Text: The defect edges were debeveled with a #15 scalpel blade.  Given the location of the defect, shape of the defect, the fact the defect involved a full thickness cartilage defect a cartilage graft was deemed most appropriate.  An appropriate donor site was identified, cleansed, and anesthetized. The cartilage graft was then harvested and transferred to the recipient site, oriented appropriately and then sutured into place.  The secondary defect was then repaired using a primary closure. Size Of Lesion In Cm: 0.7 Complex Repair And Melolabial Flap Text: The defect edges were debeveled with a #15 scalpel blade.  The primary defect was closed partially with a complex linear closure.  Given the location of the remaining defect, shape of the defect and the proximity to free margins a melolabial flap was deemed most appropriate for complete closure of the defect.  Using a sterile surgical marker, an appropriate advancement flap was drawn incorporating the defect and placing the expected incisions within the relaxed skin tension lines where possible.    The area thus outlined was incised deep to adipose tissue with a #15 scalpel blade.  The skin margins were undermined to an appropriate distance in all directions utilizing iris scissors. Complex Repair And Modified Advancement Flap Text: The defect edges were debeveled with a #15 scalpel blade.  The primary defect was closed partially with a complex linear closure.  Given the location of the remaining defect, shape of the defect and the proximity to free margins a modified advancement flap was deemed most appropriate for complete closure of the defect.  Using a sterile surgical marker, an appropriate advancement flap was drawn incorporating the defect and placing the expected incisions within the relaxed skin tension lines where possible.    The area thus outlined was incised deep to adipose tissue with a #15 scalpel blade.  The skin margins were undermined to an appropriate distance in all directions utilizing iris scissors. Purse String (Simple) Text: Given the location of the defect and the characteristics of the surrounding skin a purse string simple closure was deemed most appropriate.  Undermining was performed circumferentially around the surgical defect.  A purse string suture was then placed and tightened. V-Y Flap Text: The defect edges were debeveled with a #15 scalpel blade.  Given the location of the defect, shape of the defect and the proximity to free margins a V-Y flap was deemed most appropriate.  Using a sterile surgical marker, an appropriate advancement flap was drawn incorporating the defect and placing the expected incisions within the relaxed skin tension lines where possible.    The area thus outlined was incised deep to adipose tissue with a #15 scalpel blade.  The skin margins were undermined to an appropriate distance in all directions utilizing iris scissors. Double Island Pedicle Flap Text: The defect edges were debeveled with a #15 scalpel blade.  Given the location of the defect, shape of the defect and the proximity to free margins a double island pedicle advancement flap was deemed most appropriate.  Using a sterile surgical marker, an appropriate advancement flap was drawn incorporating the defect, outlining the appropriate donor tissue and placing the expected incisions within the relaxed skin tension lines where possible.    The area thus outlined was incised deep to adipose tissue with a #15 scalpel blade.  The skin margins were undermined to an appropriate distance in all directions around the primary defect and laterally outward around the island pedicle utilizing iris scissors.  There was minimal undermining beneath the pedicle flap. Bilobed Flap Text: The defect edges were debeveled with a #15 scalpel blade.  Given the location of the defect and the proximity to free margins a bilobe flap was deemed most appropriate.  Using a sterile surgical marker, an appropriate bilobe flap drawn around the defect.    The area thus outlined was incised deep to adipose tissue with a #15 scalpel blade.  The skin margins were undermined to an appropriate distance in all directions utilizing iris scissors. Complex Repair And Ftsg Text: The defect edges were debeveled with a #15 scalpel blade.  The primary defect was closed partially with a complex linear closure.  Given the location of the defect, shape of the defect and the proximity to free margins a full thickness skin graft was deemed most appropriate to repair the remaining defect.  The graft was trimmed to fit the size of the remaining defect.  The graft was then placed in the primary defect, oriented appropriately, and sutured into place. no indicators present Post-Care Instructions: I reviewed with the patient in detail post-care instructions. Patient is not to engage in any heavy lifting, exercise, or swimming for the next 14 days. Should the patient develop any fevers, chills, bleeding, severe pain patient will contact the office immediately. S Plasty Text: Given the location and shape of the defect, and the orientation of relaxed skin tension lines, an S-plasty was deemed most appropriate for repair.  Using a sterile surgical marker, the appropriate outline of the S-plasty was drawn, incorporating the defect and placing the expected incisions within the relaxed skin tension lines where possible.  The area thus outlined was incised deep to adipose tissue with a #15 scalpel blade.  The skin margins were undermined to an appropriate distance in all directions utilizing iris scissors. The skin flaps were advanced over the defect.  The opposing margins were then approximated with interrupted buried subcutaneous sutures. O-Z Plasty Text: The defect edges were debeveled with a #15 scalpel blade.  Given the location of the defect, shape of the defect and the proximity to free margins an O-Z plasty (double transposition flap) was deemed most appropriate.  Using a sterile surgical marker, the appropriate transposition flaps were drawn incorporating the defect and placing the expected incisions within the relaxed skin tension lines where possible.    The area thus outlined was incised deep to adipose tissue with a #15 scalpel blade.  The skin margins were undermined to an appropriate distance in all directions utilizing iris scissors.  Hemostasis was achieved with electrocautery.  The flaps were then transposed into place, one clockwise and the other counterclockwise, and anchored with interrupted buried subcutaneous sutures. Crescentic Advancement Flap Text: The defect edges were debeveled with a #15 scalpel blade.  Given the location of the defect and the proximity to free margins a crescentic advancement flap was deemed most appropriate.  Using a sterile surgical marker, the appropriate advancement flap was drawn incorporating the defect and placing the expected incisions within the relaxed skin tension lines where possible.    The area thus outlined was incised deep to adipose tissue with a #15 scalpel blade.  The skin margins were undermined to an appropriate distance in all directions utilizing iris scissors. Graft Donor Site Bandage (Optional-Leave Blank If You Don't Want In Note): Steri-strips and a pressure bandage were applied to the donor site. Size Of Margin In Cm: 0.4 Complex Repair And Bilobe Flap Text: The defect edges were debeveled with a #15 scalpel blade.  The primary defect was closed partially with a complex linear closure.  Given the location of the remaining defect, shape of the defect and the proximity to free margins a bilobe flap was deemed most appropriate for complete closure of the defect.  Using a sterile surgical marker, an appropriate advancement flap was drawn incorporating the defect and placing the expected incisions within the relaxed skin tension lines where possible.    The area thus outlined was incised deep to adipose tissue with a #15 scalpel blade.  The skin margins were undermined to an appropriate distance in all directions utilizing iris scissors. A-T Advancement Flap Text: The defect edges were debeveled with a #15 scalpel blade.  Given the location of the defect, shape of the defect and the proximity to free margins an A-T advancement flap was deemed most appropriate.  Using a sterile surgical marker, an appropriate advancement flap was drawn incorporating the defect and placing the expected incisions within the relaxed skin tension lines where possible.    The area thus outlined was incised deep to adipose tissue with a #15 scalpel blade.  The skin margins were undermined to an appropriate distance in all directions utilizing iris scissors. Interpolation Flap Text: A decision was made to reconstruct the defect utilizing an interpolation axial flap and a staged reconstruction.  A telfa template was made of the defect.  This telfa template was then used to outline the interpolation flap.  The donor area for the pedicle flap was then injected with anesthesia.  The flap was excised through the skin and subcutaneous tissue down to the layer of the underlying musculature.  The interpolation flap was carefully excised within this deep plane to maintain its blood supply.  The edges of the donor site were undermined.   The donor site was closed in a primary fashion.  The pedicle was then rotated into position and sutured.  Once the tube was sutured into place, adequate blood supply was confirmed with blanching and refill.  The pedicle was then wrapped with xeroform gauze and dressed appropriately with a telfa and gauze bandage to ensure continued blood supply and protect the attached pedicle. Hemostasis: Electrocautery Complex Repair And Epidermal Autograft Text: The defect edges were debeveled with a #15 scalpel blade.  The primary defect was closed partially with a complex linear closure.  Given the location of the defect, shape of the defect and the proximity to free margins an epidermal autograft was deemed most appropriate to repair the remaining defect.  The graft was trimmed to fit the size of the remaining defect.  The graft was then placed in the primary defect, oriented appropriately, and sutured into place. Muscle Hinge Flap Text: The defect edges were debeveled with a #15 scalpel blade.  Given the size, depth and location of the defect and the proximity to free margins a muscle hinge flap was deemed most appropriate.  Using a sterile surgical marker, an appropriate hinge flap was drawn incorporating the defect. The area thus outlined was incised with a #15 scalpel blade.  The skin margins were undermined to an appropriate distance in all directions utilizing iris scissors. Excision Method: Fusiform Rotation Flap Text: The defect edges were debeveled with a #15 scalpel blade.  Given the location of the defect, shape of the defect and the proximity to free margins a rotation flap was deemed most appropriate.  Using a sterile surgical marker, an appropriate rotation flap was drawn incorporating the defect and placing the expected incisions within the relaxed skin tension lines where possible.    The area thus outlined was incised deep to adipose tissue with a #15 scalpel blade.  The skin margins were undermined to an appropriate distance in all directions utilizing iris scissors. Melolabial Interpolation Flap Text: A decision was made to reconstruct the defect utilizing an interpolation axial flap and a staged reconstruction.  A telfa template was made of the defect.  This telfa template was then used to outline the melolabial interpolation flap.  The donor area for the pedicle flap was then injected with anesthesia.  The flap was excised through the skin and subcutaneous tissue down to the layer of the underlying musculature.  The pedicle flap was carefully excised within this deep plane to maintain its blood supply.  The edges of the donor site were undermined.   The donor site was closed in a primary fashion.  The pedicle was then rotated into position and sutured.  Once the tube was sutured into place, adequate blood supply was confirmed with blanching and refill.  The pedicle was then wrapped with xeroform gauze and dressed appropriately with a telfa and gauze bandage to ensure continued blood supply and protect the attached pedicle. Epidermal Closure: running Repair Performed By Another Provider Text (Leave Blank If You Do Not Want): After the tissue was excised the defect was repaired by another provider. Anesthesia Type: 1% lidocaine with epinephrine Lab Facility: [id number] Partial Purse String (Simple) Text: Given the location of the defect and the characteristics of the surrounding skin a simple purse string closure was deemed most appropriate.  Undermining was performed circumferentially around the surgical defect.  A purse string suture was then placed and tightened. Wound tension of the circular defect prevented complete closure of the wound. Suture Removal: 12 days Cheek-To-Nose Interpolation Flap Text: A decision was made to reconstruct the defect utilizing an interpolation axial flap and a staged reconstruction.  A telfa template was made of the defect.  This telfa template was then used to outline the Cheek-To-Nose Interpolation flap.  The donor area for the pedicle flap was then injected with anesthesia.  The flap was excised through the skin and subcutaneous tissue down to the layer of the underlying musculature.  The interpolation flap was carefully excised within this deep plane to maintain its blood supply.  The edges of the donor site were undermined.   The donor site was closed in a primary fashion.  The pedicle was then rotated into position and sutured.  Once the tube was sutured into place, adequate blood supply was confirmed with blanching and refill.  The pedicle was then wrapped with xeroform gauze and dressed appropriately with a telfa and gauze bandage to ensure continued blood supply and protect the attached pedicle. Complex Repair And O-L Flap Text: The defect edges were debeveled with a #15 scalpel blade.  The primary defect was closed partially with a complex linear closure.  Given the location of the remaining defect, shape of the defect and the proximity to free margins an O-L flap was deemed most appropriate for complete closure of the defect.  Using a sterile surgical marker, an appropriate flap was drawn incorporating the defect and placing the expected incisions within the relaxed skin tension lines where possible.    The area thus outlined was incised deep to adipose tissue with a #15 scalpel blade.  The skin margins were undermined to an appropriate distance in all directions utilizing iris scissors. Dorsal Nasal Flap Text: The defect edges were debeveled with a #15 scalpel blade.  Given the location of the defect and the proximity to free margins a dorsal nasal flap was deemed most appropriate.  Using a sterile surgical marker, an appropriate dorsal nasal flap was drawn around the defect.    The area thus outlined was incised deep to adipose tissue with a #15 scalpel blade.  The skin margins were undermined to an appropriate distance in all directions utilizing iris scissors. Hatchet Flap Text: The defect edges were debeveled with a #15 scalpel blade.  Given the location of the defect, shape of the defect and the proximity to free margins a hatchet flap was deemed most appropriate.  Using a sterile surgical marker, an appropriate hatchet flap was drawn incorporating the defect and placing the expected incisions within the relaxed skin tension lines where possible.    The area thus outlined was incised deep to adipose tissue with a #15 scalpel blade.  The skin margins were undermined to an appropriate distance in all directions utilizing iris scissors. Advancement-Rotation Flap Text: The defect edges were debeveled with a #15 scalpel blade.  Given the location of the defect, shape of the defect and the proximity to free margins an advancement-rotation flap was deemed most appropriate.  Using a sterile surgical marker, an appropriate flap was drawn incorporating the defect and placing the expected incisions within the relaxed skin tension lines where possible. The area thus outlined was incised deep to adipose tissue with a #15 scalpel blade.  The skin margins were undermined to an appropriate distance in all directions utilizing iris scissors. Ftsg Text: The defect edges were debeveled with a #15 scalpel blade.  Given the location of the defect, shape of the defect and the proximity to free margins a full thickness skin graft was deemed most appropriate.  Using a sterile surgical marker, the primary defect shape was transferred to the donor site. The area thus outlined was incised deep to adipose tissue with a #15 scalpel blade.  The harvested graft was then trimmed of adipose tissue until only dermis and epidermis was left.  The skin margins of the secondary defect were undermined to an appropriate distance in all directions utilizing iris scissors.  The secondary defect was closed with interrupted buried subcutaneous sutures.  The skin edges were then re-apposed with running  sutures.  The skin graft was then placed in the primary defect and oriented appropriately. Melolabial Transposition Flap Text: The defect edges were debeveled with a #15 scalpel blade.  Given the location of the defect and the proximity to free margins a melolabial flap was deemed most appropriate.  Using a sterile surgical marker, an appropriate melolabial transposition flap was drawn incorporating the defect.    The area thus outlined was incised deep to adipose tissue with a #15 scalpel blade.  The skin margins were undermined to an appropriate distance in all directions utilizing iris scissors. Excision Depth: adipose tissue Path Notes (To The Dermatopathologist): Please check margins. Date Of Previous Biopsy (Optional): 6/5/18 Posterior Auricular Interpolation Flap Text: A decision was made to reconstruct the defect utilizing an interpolation axial flap and a staged reconstruction.  A telfa template was made of the defect.  This telfa template was then used to outline the posterior auricular interpolation flap.  The donor area for the pedicle flap was then injected with anesthesia.  The flap was excised through the skin and subcutaneous tissue down to the layer of the underlying musculature.  The pedicle flap was carefully excised within this deep plane to maintain its blood supply.  The edges of the donor site were undermined.   The donor site was closed in a primary fashion.  The pedicle was then rotated into position and sutured.  Once the tube was sutured into place, adequate blood supply was confirmed with blanching and refill.  The pedicle was then wrapped with xeroform gauze and dressed appropriately with a telfa and gauze bandage to ensure continued blood supply and protect the attached pedicle. Curvilinear Excision Additional Text (Leave Blank If You Do Not Want): The margin was drawn around the clinically apparent lesion.  A curvilinear shape was then drawn on the skin incorporating the lesion and margins.  Incisions were then made along these lines to the appropriate tissue plane and the lesion was extirpated. Complex Repair And Rhombic Flap Text: The defect edges were debeveled with a #15 scalpel blade.  The primary defect was closed partially with a complex linear closure.  Given the location of the remaining defect, shape of the defect and the proximity to free margins a rhombic flap was deemed most appropriate for complete closure of the defect.  Using a sterile surgical marker, an appropriate advancement flap was drawn incorporating the defect and placing the expected incisions within the relaxed skin tension lines where possible.    The area thus outlined was incised deep to adipose tissue with a #15 scalpel blade.  The skin margins were undermined to an appropriate distance in all directions utilizing iris scissors. Saucerization Excision Additional Text (Leave Blank If You Do Not Want): The margin was drawn around the clinically apparent lesion.  Incisions were then made along these lines, in a tangential fashion, to the appropriate tissue plane and the lesion was extirpated. Dermal Autograft Text: The defect edges were debeveled with a #15 scalpel blade.  Given the location of the defect, shape of the defect and the proximity to free margins a dermal autograft was deemed most appropriate.  Using a sterile surgical marker, the primary defect shape was transferred to the donor site. The area thus outlined was incised deep to adipose tissue with a #15 scalpel blade.  The harvested graft was then trimmed of adipose and epidermal tissue until only dermis was left.  The skin graft was then placed in the primary defect and oriented appropriately. Complex Repair And Transposition Flap Text: The defect edges were debeveled with a #15 scalpel blade.  The primary defect was closed partially with a complex linear closure.  Given the location of the remaining defect, shape of the defect and the proximity to free margins a transposition flap was deemed most appropriate for complete closure of the defect.  Using a sterile surgical marker, an appropriate advancement flap was drawn incorporating the defect and placing the expected incisions within the relaxed skin tension lines where possible.    The area thus outlined was incised deep to adipose tissue with a #15 scalpel blade.  The skin margins were undermined to an appropriate distance in all directions utilizing iris scissors. Complex Repair And Skin Substitute Graft Text: The defect edges were debeveled with a #15 scalpel blade.  The primary defect was closed partially with a complex linear closure.  Given the location of the remaining defect, shape of the defect and the proximity to free margins a skin substitute graft was deemed most appropriate to repair the remaining defect.  The graft was trimmed to fit the size of the remaining defect.  The graft was then placed in the primary defect, oriented appropriately, and sutured into place. Complex Repair And Split-Thickness Skin Graft Text: The defect edges were debeveled with a #15 scalpel blade.  The primary defect was closed partially with a complex linear closure.  Given the location of the defect, shape of the defect and the proximity to free margins a split thickness skin graft was deemed most appropriate to repair the remaining defect.  The graft was trimmed to fit the size of the remaining defect.  The graft was then placed in the primary defect, oriented appropriately, and sutured into place. Transposition Flap Text: The defect edges were debeveled with a #15 scalpel blade.  Given the location of the defect and the proximity to free margins a transposition flap was deemed most appropriate.  Using a sterile surgical marker, an appropriate transposition flap was drawn incorporating the defect.    The area thus outlined was incised deep to adipose tissue with a #15 scalpel blade.  The skin margins were undermined to an appropriate distance in all directions utilizing iris scissors. Modified Advancement Flap Text: The defect edges were debeveled with a #15 scalpel blade.  Given the location of the defect, shape of the defect and the proximity to free margins a modified advancement flap was deemed most appropriate.  Using a sterile surgical marker, an appropriate advancement flap was drawn incorporating the defect and placing the expected incisions within the relaxed skin tension lines where possible.    The area thus outlined was incised deep to adipose tissue with a #15 scalpel blade.  The skin margins were undermined to an appropriate distance in all directions utilizing iris scissors. Wound Care: Vaniply Complex Repair And O-T Advancement Flap Text: The defect edges were debeveled with a #15 scalpel blade.  The primary defect was closed partially with a complex linear closure.  Given the location of the remaining defect, shape of the defect and the proximity to free margins an O-T advancement flap was deemed most appropriate for complete closure of the defect.  Using a sterile surgical marker, an appropriate advancement flap was drawn incorporating the defect and placing the expected incisions within the relaxed skin tension lines where possible.    The area thus outlined was incised deep to adipose tissue with a #15 scalpel blade.  The skin margins were undermined to an appropriate distance in all directions utilizing iris scissors. Alar Island Pedicle Flap Text: The defect edges were debeveled with a #15 scalpel blade.  Given the location of the defect, shape of the defect and the proximity to the alar rim an island pedicle advancement flap was deemed most appropriate.  Using a sterile surgical marker, an appropriate advancement flap was drawn incorporating the defect, outlining the appropriate donor tissue and placing the expected incisions within the nasal ala running parallel to the alar rim. The area thus outlined was incised with a #15 scalpel blade.  The skin margins were undermined minimally to an appropriate distance in all directions around the primary defect and laterally outward around the island pedicle utilizing iris scissors.  There was minimal undermining beneath the pedicle flap. Island Pedicle Flap Text: The defect edges were debeveled with a #15 scalpel blade.  Given the location of the defect, shape of the defect and the proximity to free margins an island pedicle advancement flap was deemed most appropriate.  Using a sterile surgical marker, an appropriate advancement flap was drawn incorporating the defect, outlining the appropriate donor tissue and placing the expected incisions within the relaxed skin tension lines where possible.    The area thus outlined was incised deep to adipose tissue with a #15 scalpel blade.  The skin margins were undermined to an appropriate distance in all directions around the primary defect and laterally outward around the island pedicle utilizing iris scissors.  There was minimal undermining beneath the pedicle flap. Fusiform Excision Additional Text (Leave Blank If You Do Not Want): The margin was drawn around the clinically apparent lesion.  A fusiform shape was then drawn on the skin incorporating the lesion and margins.  Incisions were then made along these lines to the appropriate tissue plane and the lesion was extirpated. Epidermal Autograft Text: The defect edges were debeveled with a #15 scalpel blade.  Given the location of the defect, shape of the defect and the proximity to free margins an epidermal autograft was deemed most appropriate.  Using a sterile surgical marker, the primary defect shape was transferred to the donor site. The epidermal graft was then harvested.  The skin graft was then placed in the primary defect and oriented appropriately. Repair Type: Complex Lab: 276 Paramedian Forehead Flap Text: A decision was made to reconstruct the defect utilizing an interpolation axial flap and a staged reconstruction.  A telfa template was made of the defect.  This telfa template was then used to outline the paramedian forehead pedicle flap.  The donor area for the pedicle flap was then injected with anesthesia.  The flap was excised through the skin and subcutaneous tissue down to the layer of the underlying musculature.  The pedicle flap was carefully excised within this deep plane to maintain its blood supply.  The edges of the donor site were undermined.   The donor site was closed in a primary fashion.  The pedicle was then rotated into position and sutured.  Once the tube was sutured into place, adequate blood supply was confirmed with blanching and refill.  The pedicle was then wrapped with xeroform gauze and dressed appropriately with a telfa and gauze bandage to ensure continued blood supply and protect the attached pedicle. Complex Repair And Single Advancement Flap Text: The defect edges were debeveled with a #15 scalpel blade.  The primary defect was closed partially with a complex linear closure.  Given the location of the remaining defect, shape of the defect and the proximity to free margins a single advancement flap was deemed most appropriate for complete closure of the defect.  Using a sterile surgical marker, an appropriate advancement flap was drawn incorporating the defect and placing the expected incisions within the relaxed skin tension lines where possible.    The area thus outlined was incised deep to adipose tissue with a #15 scalpel blade.  The skin margins were undermined to an appropriate distance in all directions utilizing iris scissors. Positioning (Leave Blank If You Do Not Want): The patient was placed in a comfortable position exposing the surgical site. Complex Repair And Xenograft Text: The defect edges were debeveled with a #15 scalpel blade.  The primary defect was closed partially with a complex linear closure.  Given the location of the defect, shape of the defect and the proximity to free margins a xenograft was deemed most appropriate to repair the remaining defect.  The graft was trimmed to fit the size of the remaining defect.  The graft was then placed in the primary defect, oriented appropriately, and sutured into place. Complex Repair And V-Y Plasty Text: The defect edges were debeveled with a #15 scalpel blade.  The primary defect was closed partially with a complex linear closure.  Given the location of the remaining defect, shape of the defect and the proximity to free margins a V-Y plasty was deemed most appropriate for complete closure of the defect.  Using a sterile surgical marker, an appropriate advancement flap was drawn incorporating the defect and placing the expected incisions within the relaxed skin tension lines where possible.    The area thus outlined was incised deep to adipose tissue with a #15 scalpel blade.  The skin margins were undermined to an appropriate distance in all directions utilizing iris scissors. Composite Graft Text: The defect edges were debeveled with a #15 scalpel blade.  Given the location of the defect, shape of the defect, the proximity to free margins and the fact the defect was full thickness a composite graft was deemed most appropriate.  The defect was outline and then transferred to the donor site.  A full thickness graft was then excised from the donor site. The graft was then placed in the primary defect, oriented appropriately and then sutured into place.  The secondary defect was then repaired using a primary closure. Bi-Rhombic Flap Text: The defect edges were debeveled with a #15 scalpel blade.  Given the location of the defect and the proximity to free margins a bi-rhombic flap was deemed most appropriate.  Using a sterile surgical marker, an appropriate rhombic flap was drawn incorporating the defect. The area thus outlined was incised deep to adipose tissue with a #15 scalpel blade.  The skin margins were undermined to an appropriate distance in all directions utilizing iris scissors. Complex Repair And A-T Advancement Flap Text: The defect edges were debeveled with a #15 scalpel blade.  The primary defect was closed partially with a complex linear closure.  Given the location of the remaining defect, shape of the defect and the proximity to free margins an A-T advancement flap was deemed most appropriate for complete closure of the defect.  Using a sterile surgical marker, an appropriate advancement flap was drawn incorporating the defect and placing the expected incisions within the relaxed skin tension lines where possible.    The area thus outlined was incised deep to adipose tissue with a #15 scalpel blade.  The skin margins were undermined to an appropriate distance in all directions utilizing iris scissors. Mucosal Advancement Flap Text: Given the location of the defect, shape of the defect and the proximity to free margins a mucosal advancement flap was deemed most appropriate. Incisions were made with a 15 blade scalpel in the appropriate fashion along the cutaneous vermilion border and the mucosal lip. The remaining actinically damaged mucosal tissue was excised.  The mucosal advancement flap was then elevated to the gingival sulcus with care taken to preserve the neurovascular structures and advanced into the primary defect. Care was taken to ensure that precise realignment of the vermilion border was achieved. Bilateral Helical Rim Advancement Flap Text: The defect edges were debeveled with a #15 blade scalpel.  Given the location of the defect and the proximity to free margins (helical rim) a bilateral helical rim advancement flap was deemed most appropriate.  Using a sterile surgical marker, the appropriate advancement flaps were drawn incorporating the defect and placing the expected incisions between the helical rim and antihelix where possible.  The area thus outlined was incised through and through with a #15 scalpel blade.  With a skin hook and iris scissors, the flaps were gently and sharply undermined and freed up. Z Plasty Text: The lesion was extirpated to the level of the fat with a #15 scalpel blade.  Given the location of the defect, shape of the defect and the proximity to free margins a Z-plasty was deemed most appropriate for repair.  Using a sterile surgical marker, the appropriate transposition arms of the Z-plasty were drawn incorporating the defect and placing the expected incisions within the relaxed skin tension lines where possible.    The area thus outlined was incised deep to adipose tissue with a #15 scalpel blade.  The skin margins were undermined to an appropriate distance in all directions utilizing iris scissors.  The opposing transposition arms were then transposed into place in opposite direction and anchored with interrupted buried subcutaneous sutures. Complex Repair And M Plasty Text: The defect edges were debeveled with a #15 scalpel blade.  The primary defect was closed partially with a complex linear closure.  Given the location of the remaining defect, shape of the defect and the proximity to free margins an M plasty was deemed most appropriate for complete closure of the defect.  Using a sterile surgical marker, an appropriate advancement flap was drawn incorporating the defect and placing the expected incisions within the relaxed skin tension lines where possible.    The area thus outlined was incised deep to adipose tissue with a #15 scalpel blade.  The skin margins were undermined to an appropriate distance in all directions utilizing iris scissors. Keystone Flap Text: The defect edges were debeveled with a #15 scalpel blade.  Given the location of the defect, shape of the defect a keystone flap was deemed most appropriate.  Using a sterile surgical marker, an appropriate keystone flap was drawn incorporating the defect, outlining the appropriate donor tissue and placing the expected incisions within the relaxed skin tension lines where possible. The area thus outlined was incised deep to adipose tissue with a #15 scalpel blade.  The skin margins were undermined to an appropriate distance in all directions around the primary defect and laterally outward around the flap utilizing iris scissors. Complex Repair And Tissue Cultured Epidermal Autograft Text: The defect edges were debeveled with a #15 scalpel blade.  The primary defect was closed partially with a complex linear closure.  Given the location of the defect, shape of the defect and the proximity to free margins an tissue cultured epidermal autograft was deemed most appropriate to repair the remaining defect.  The graft was trimmed to fit the size of the remaining defect.  The graft was then placed in the primary defect, oriented appropriately, and sutured into place. Home Suture Removal Text: Patient was provided a home suture removal kit and will remove their sutures at home.  If they have any questions or difficulties they will call the office. Tissue Cultured Epidermal Autograft Text: The defect edges were debeveled with a #15 scalpel blade.  Given the location of the defect, shape of the defect and the proximity to free margins a tissue cultured epidermal autograft was deemed most appropriate.  The graft was then trimmed to fit the size of the defect.  The graft was then placed in the primary defect and oriented appropriately. Deep Sutures: 4-0 Polysorb H Plasty Text: Given the location of the defect, shape of the defect and the proximity to free margins a H-plasty was deemed most appropriate for repair.  Using a sterile surgical marker, the appropriate advancement arms of the H-plasty were drawn incorporating the defect and placing the expected incisions within the relaxed skin tension lines where possible. The area thus outlined was incised deep to adipose tissue with a #15 scalpel blade. The skin margins were undermined to an appropriate distance in all directions utilizing iris scissors.  The opposing advancement arms were then advanced into place in opposite direction and anchored with interrupted buried subcutaneous sutures. Slit Excision Additional Text (Leave Blank If You Do Not Want): A linear line was drawn on the skin overlying the lesion. An incision was made slowly until the lesion was visualized.  Once visualized, the lesion was removed with blunt dissection. Excisional Biopsy Additional Text (Leave Blank If You Do Not Want): The margin was drawn around the clinically apparent lesion.  An elliptical shape was then drawn on the skin incorporating the lesion and margins.  Incisions were then made along these lines to the appropriate tissue plane and the lesion was extirpated. Complex Repair And Rotation Flap Text: The defect edges were debeveled with a #15 scalpel blade.  The primary defect was closed partially with a complex linear closure.  Given the location of the remaining defect, shape of the defect and the proximity to free margins a rotation flap was deemed most appropriate for complete closure of the defect.  Using a sterile surgical marker, an appropriate advancement flap was drawn incorporating the defect and placing the expected incisions within the relaxed skin tension lines where possible.    The area thus outlined was incised deep to adipose tissue with a #15 scalpel blade.  The skin margins were undermined to an appropriate distance in all directions utilizing iris scissors. W Plasty Text: The lesion was extirpated to the level of the fat with a #15 scalpel blade.  Given the location of the defect, shape of the defect and the proximity to free margins a W-plasty was deemed most appropriate for repair.  Using a sterile surgical marker, the appropriate transposition arms of the W-plasty were drawn incorporating the defect and placing the expected incisions within the relaxed skin tension lines where possible.    The area thus outlined was incised deep to adipose tissue with a #15 scalpel blade.  The skin margins were undermined to an appropriate distance in all directions utilizing iris scissors.  The opposing transposition arms were then transposed into place in opposite direction and anchored with interrupted buried subcutaneous sutures. Island Pedicle Flap With Canthal Suspension Text: The defect edges were debeveled with a #15 scalpel blade.  Given the location of the defect, shape of the defect and the proximity to free margins an island pedicle advancement flap was deemed most appropriate.  Using a sterile surgical marker, an appropriate advancement flap was drawn incorporating the defect, outlining the appropriate donor tissue and placing the expected incisions within the relaxed skin tension lines where possible. The area thus outlined was incised deep to adipose tissue with a #15 scalpel blade.  The skin margins were undermined to an appropriate distance in all directions around the primary defect and laterally outward around the island pedicle utilizing iris scissors.  There was minimal undermining beneath the pedicle flap. A suspension suture was placed in the canthal tendon to prevent tension and prevent ectropion. Skin Substitute Text: The defect edges were debeveled with a #15 scalpel blade.  Given the location of the defect, shape of the defect and the proximity to free margins a skin substitute graft was deemed most appropriate.  The graft material was trimmed to fit the size of the defect. The graft was then placed in the primary defect and oriented appropriately. Lip Wedge Excision Repair Text: Given the location of the defect and the proximity to free margins a full thickness wedge repair was deemed most appropriate.  Using a sterile surgical marker, the appropriate repair was drawn incorporating the defect and placing the expected incisions perpendicular to the vermillion border.  The vermillion border was also meticulously outlined to ensure appropriate reapproximation during the repair.  The area thus outlined was incised through and through with a #15 scalpel blade.  The muscularis and dermis were reaproximated with deep sutures following hemostasis. Care was taken to realign the vermillion border before proceeding with the superficial closure.  Once the vermillion was realigned the superfical and mucosal closure was finished. Xenograft Text: The defect edges were debeveled with a #15 scalpel blade.  Given the location of the defect, shape of the defect and the proximity to free margins a xenograft was deemed most appropriate.  The graft was then trimmed to fit the size of the defect.  The graft was then placed in the primary defect and oriented appropriately. Detail Level: Detailed Burow's Advancement Flap Text: The defect edges were debeveled with a #15 scalpel blade.  Given the location of the defect and the proximity to free margins a Burow's advancement flap was deemed most appropriate.  Using a sterile surgical marker, the appropriate advancement flap was drawn incorporating the defect and placing the expected incisions within the relaxed skin tension lines where possible.    The area thus outlined was incised deep to adipose tissue with a #15 scalpel blade.  The skin margins were undermined to an appropriate distance in all directions utilizing iris scissors. Purse String (Intermediate) Text: Given the location of the defect and the characteristics of the surrounding skin a pursestring intermediate closure was deemed most appropriate.  Undermining was performed circumfirentially around the surgical defect.  A purstring suture was then placed and tightened. Advancement Flap (Double) Text: The defect edges were debeveled with a #15 scalpel blade.  Given the location of the defect and the proximity to free margins a double advancement flap was deemed most appropriate.  Using a sterile surgical marker, the appropriate advancement flaps were drawn incorporating the defect and placing the expected incisions within the relaxed skin tension lines where possible.    The area thus outlined was incised deep to adipose tissue with a #15 scalpel blade.  The skin margins were undermined to an appropriate distance in all directions utilizing iris scissors. Helical Rim Advancement Flap Text: The defect edges were debeveled with a #15 blade scalpel.  Given the location of the defect and the proximity to free margins (helical rim) a double helical rim advancement flap was deemed most appropriate.  Using a sterile surgical marker, the appropriate advancement flaps were drawn incorporating the defect and placing the expected incisions between the helical rim and antihelix where possible.  The area thus outlined was incised through and through with a #15 scalpel blade.  With a skin hook and iris scissors, the flaps were gently and sharply undermined and freed up. Consent was obtained from the patient. The risks and benefits to therapy were discussed in detail. Specifically, the risks of infection, scarring, bleeding, prolonged wound healing, incomplete removal, allergy to anesthesia, nerve injury and recurrence were addressed. Prior to the procedure, the treatment site was clearly identified and confirmed by the patient. All components of Universal Protocol/PAUSE Rule completed. Epidermal Sutures: 4-0 Nylon Epidermal Closure Graft Donor Site (Optional): simple interrupted Intermediate / Complex Repair - Final Wound Length In Cm: 4.9 O-T Plasty Text: The defect edges were debeveled with a #15 scalpel blade.  Given the location of the defect, shape of the defect and the proximity to free margins an O-T plasty was deemed most appropriate.  Using a sterile surgical marker, an appropriate O-T plasty was drawn incorporating the defect and placing the expected incisions within the relaxed skin tension lines where possible.    The area thus outlined was incised deep to adipose tissue with a #15 scalpel blade.  The skin margins were undermined to an appropriate distance in all directions utilizing iris scissors. Previous Accession (Optional): [id number] Estimated Blood Loss (Cc): minimal Bilobed Transposition Flap Text: The defect edges were debeveled with a #15 scalpel blade.  Given the location of the defect and the proximity to free margins a bilobed transposition flap was deemed most appropriate.  Using a sterile surgical marker, an appropriate bilobe flap drawn around the defect.    The area thus outlined was incised deep to adipose tissue with a #15 scalpel blade.  The skin margins were undermined to an appropriate distance in all directions utilizing iris scissors. Billing Type: Third-Party Bill Pre-Excision Curettage Text (Leave Blank If You Do Not Want): Prior to drawing the surgical margin the visible lesion was removed with electrodesiccation and curettage to clearly define the lesion size.

## 2018-12-14 NOTE — H&P ADULT - PROBLEM SELECTOR PROBLEM 9
-CP x 2 weeks on exertion relieved with rest  -History of Systolic CHF w/ EF of 15-20% w/ persistent tachycardia   -Prior cath at [facility] in 2016- in chart  -Echo: Sinus tach appears unchanged from prior  -Trop 1 0.068-->0.056; f/u T3 in AM   -Serial EKG's  -Asa, statin, b-blocker  -Tele monitoring   -Will follow up repeat Echo  -Cardio: Dr. Palafox Prophylactic measure

## 2019-05-09 NOTE — PROGRESS NOTE ADULT - PROBLEM SELECTOR PLAN 3
Severe acidosis: mostly hypercarbic, on full vent support: To cont current meds   and mechanical vent support No

## 2019-10-30 NOTE — CONSULT NOTE ADULT - PROBLEM/RECOMMENDATION-1
Addended by: HARLAN RODRIGUEZ on: 10/30/2019 01:45 PM     Modules accepted: Orders    
DISPLAY PLAN FREE TEXT

## 2021-08-25 NOTE — CONSULT NOTE ADULT - NSPROBSELRECBLANK_5_GEN
ANTICOAGULATION FOLLOW-UP CLINIC VISIT    Patient Name:  Yogesh Abreu  Date:  2021  Contact Type:  [employer] website    SUBJECTIVE:         OBJECTIVE    Recent labs: (last 7 days)     21  1548   INR 2.5*       ASSESSMENT / PLAN  INR assessment THER    Recheck INR In: 2 WEEKS    INR Location Home INR      Anticoagulation Summary  As of 2021    INR goal:  2.0-3.0   TTR:  65.2 % (1 y)   INR used for dosin.5 (2021)   Warfarin maintenance plan:  5 mg (5 mg x 1) every Thu; 7.5 mg (5 mg x 1.5) all other days   Full warfarin instructions:  5 mg every Thu; 7.5 mg all other days   Weekly warfarin total:  50 mg   No change documented:  Tanja Wills RN   Plan last modified:  Tanja Wills RN (2021)   Next INR check:  2021   Target end date:  Indefinite    Indications    PE (pulmonary embolism) [I26.99]  Long term current use of anticoagulants with INR goal of 2.0-3.0 [Z79.01]             Anticoagulation Episode Summary     INR check location:      Preferred lab:      Send INR reminders to:  [facility] HEART INR NURSE    Comments:        Anticoagulation Care Providers     Provider Role Specialty Phone number    Frederic Isaacs MD Referring Cardiovascular Disease [phone]            See the Encounter Report to view Anticoagulation Flowsheet and Dosing Calendar (Go to Encounters tab in chart review, and find the Anticoagulation Therapy Visit)    Home INR 2.5 Will continue current dosing of 5 mg Th and 7.5 mg all other days with recheck in 2 weeks. Will call pt after the next INR check.  Tanja Wills RN                 
DISPLAY PLAN FREE TEXT
DISPLAY PLAN FREE TEXT

## 2024-12-13 NOTE — PROGRESS NOTE ADULT - PROBLEM SELECTOR PROBLEM 3
Detail Level: Detailed
Quality 226: Preventive Care And Screening: Tobacco Use: Screening And Cessation Intervention: Patient screened for tobacco use and is an ex/non-smoker
Chronic congestive heart failure, unspecified congestive heart failure type
Acidosis, metabolic, with respiratory acidosis
Acidosis, metabolic, with respiratory acidosis
Chronic congestive heart failure, unspecified congestive heart failure type
Acidosis, metabolic, with respiratory acidosis
Chronic congestive heart failure, unspecified congestive heart failure type
Diabetes mellitus
Acidosis, metabolic, with respiratory acidosis

## 2025-03-05 NOTE — ED ADULT NURSE REASSESSMENT NOTE - CARDIO ASSESSMENT
Patient came to clinic for anticoagulation monitoring.  Last INR on 12/11/24 was 2.3.  Dose maintained.   Today's INR is 3.1 and is above goal range.    Current warfarin total weekly dose of 28.5 mg verified.  Informed the INR result is above therapeutic range and instructed to maintain current dose per protocol. Discussed dose and return date of 3/19/25 for next INR. See Anticoagulation flowsheet.    Dr. Guallpa is in the office today supervising the treatment.    Call your physician or seek medical care immediately if you notice any of the following symptoms of a bleed:   Red, dark, coffee or cola colored urine  Red or tar like stools  Excessive bleeding from gums or nose  Vomiting coffee colored or bright red material  Coughing up red tinged sputum  Severe or unprovoked pain (ex: severe Headache or Abdominal pain)  Sudden, spontaneous bruising for no reason  For female patients:  Excessive menstrual bleeding  A cut that will not stop bleeding within 10-15 mins  Symptoms associated with abnormal bleeding/high INR reviewed.    Encouraged to avoid activities that may result in a serious fall or injury and verbalizes understanding.    Instructed to contact the clinic with any unusual bleeding or bruising, any changes in medications, diet, health status, lifestyle, or any other changes, questions or concerns. Verbalized understanding of all discussed.    WDL